# Patient Record
Sex: FEMALE | Race: BLACK OR AFRICAN AMERICAN | NOT HISPANIC OR LATINO | Employment: OTHER | URBAN - METROPOLITAN AREA
[De-identification: names, ages, dates, MRNs, and addresses within clinical notes are randomized per-mention and may not be internally consistent; named-entity substitution may affect disease eponyms.]

---

## 2017-01-24 PROBLEM — I95.1 ORTHOSTATIC HYPOTENSION: Status: ACTIVE | Noted: 2017-01-24

## 2017-01-24 PROBLEM — I49.1 PREMATURE ATRIAL CONTRACTIONS: Status: ACTIVE | Noted: 2017-01-24

## 2017-01-24 PROBLEM — J20.9 ACUTE PURULENT BRONCHITIS: Status: ACTIVE | Noted: 2017-01-24

## 2017-08-08 PROBLEM — Z86.79 HISTORY OF PSVT (PAROXYSMAL SUPRAVENTRICULAR TACHYCARDIA): Status: ACTIVE | Noted: 2017-08-08

## 2018-04-09 ENCOUNTER — OFFICE VISIT (OUTPATIENT)
Dept: CARDIOLOGY | Facility: CLINIC | Age: 78
End: 2018-04-09
Payer: MEDICARE

## 2018-04-09 VITALS
HEART RATE: 63 BPM | DIASTOLIC BLOOD PRESSURE: 77 MMHG | SYSTOLIC BLOOD PRESSURE: 129 MMHG | HEIGHT: 65 IN | BODY MASS INDEX: 30.04 KG/M2 | WEIGHT: 180.31 LBS

## 2018-04-09 DIAGNOSIS — E78.5 HYPERLIPIDEMIA, UNSPECIFIED HYPERLIPIDEMIA TYPE: ICD-10-CM

## 2018-04-09 DIAGNOSIS — I49.1 PREMATURE ATRIAL CONTRACTIONS: ICD-10-CM

## 2018-04-09 DIAGNOSIS — I95.1 ORTHOSTATIC HYPOTENSION: ICD-10-CM

## 2018-04-09 DIAGNOSIS — Z86.79 HISTORY OF PSVT (PAROXYSMAL SUPRAVENTRICULAR TACHYCARDIA): ICD-10-CM

## 2018-04-09 DIAGNOSIS — I44.4 LEFT ANTERIOR HEMIBLOCK: ICD-10-CM

## 2018-04-09 DIAGNOSIS — I44.0 FIRST DEGREE AV BLOCK: ICD-10-CM

## 2018-04-09 DIAGNOSIS — I10 ESSENTIAL HYPERTENSION: Primary | ICD-10-CM

## 2018-04-09 DIAGNOSIS — I44.1 2ND DEGREE AV BLOCK: ICD-10-CM

## 2018-04-09 DIAGNOSIS — I10 ESSENTIAL HYPERTENSION, BENIGN: ICD-10-CM

## 2018-04-09 PROCEDURE — 99999 PR PBB SHADOW E&M-NEW PATIENT-LVL III: CPT | Mod: PBBFAC,,, | Performed by: INTERNAL MEDICINE

## 2018-04-09 PROCEDURE — 99213 OFFICE O/P EST LOW 20 MIN: CPT | Mod: S$GLB,,, | Performed by: INTERNAL MEDICINE

## 2018-04-09 PROCEDURE — 93000 ELECTROCARDIOGRAM COMPLETE: CPT | Mod: S$GLB,,, | Performed by: INTERNAL MEDICINE

## 2018-04-09 PROCEDURE — 3078F DIAST BP <80 MM HG: CPT | Mod: CPTII,S$GLB,, | Performed by: INTERNAL MEDICINE

## 2018-04-09 PROCEDURE — 3074F SYST BP LT 130 MM HG: CPT | Mod: CPTII,S$GLB,, | Performed by: INTERNAL MEDICINE

## 2018-04-09 RX ORDER — DIPHENHYDRAMINE HCL 25 MG
25 CAPSULE ORAL NIGHTLY PRN
COMMUNITY

## 2018-04-09 NOTE — PROGRESS NOTES
Subjective:      Patient ID: Za Limon is a 78 y.o. female.    Chief Complaint: Follow-up (Hypertension)    HPI: Had the flu and sinus infection.    Pt had chest pain during the flu when coughing.     Review of Systems   Cardiovascular: Positive for chest pain (during the flu) and leg swelling (after eating xenia the left leg swells). Negative for claudication, dyspnea on exertion, irregular heartbeat, near-syncope, orthopnea, palpitations and syncope.        Past Medical History:   Diagnosis Date    CLL (chronic lymphocytic leukemia)     Hyperlipidemia     Hypertension         Past Surgical History:   Procedure Laterality Date    HYSTERECTOMY      left leg laser surgery         Family History   Problem Relation Age of Onset    Stroke Father     Heart disease Sister     Heart disease Brother        Social History     Social History    Marital status:      Spouse name: N/A    Number of children: N/A    Years of education: N/A     Social History Main Topics    Smoking status: Never Smoker    Smokeless tobacco: Never Used    Alcohol use None    Drug use: Unknown    Sexual activity: Not Asked     Other Topics Concern    None     Social History Narrative    None       Current Outpatient Prescriptions on File Prior to Visit   Medication Sig Dispense Refill    acetaminophen (TYLENOL) 500 MG tablet Take 500 mg by mouth every 6 (six) hours as needed for Pain.      aspirin (ECOTRIN) 81 MG EC tablet Take 81 mg by mouth once daily.      escitalopram oxalate (LEXAPRO) 10 MG tablet Take 10 mg by mouth once daily.       losartan-hydrochlorothiazide 100-12.5 mg (HYZAAR) 100-12.5 mg Tab 1 tablet once daily. One half tablet daily    2    omeprazole (PRILOSEC) 20 MG capsule Take 20 mg by mouth once daily.   3    rosuvastatin (CRESTOR) 40 MG Tab Take 10 mg by mouth every evening.       No current facility-administered medications on file prior to visit.        Review of patient's allergies  "indicates:   Allergen Reactions    Codeine      Objective:     Vitals:    04/09/18 1428   BP: 129/77   BP Location: Right arm   Patient Position: Sitting   BP Method: Large (Automatic)   Pulse: 63   Weight: 81.8 kg (180 lb 4.8 oz)   Height: 5' 5" (1.651 m)        Physical Exam   Constitutional: She is oriented to person, place, and time. She appears well-developed and well-nourished.   Eyes: No scleral icterus.   Neck: No JVD present. Carotid bruit is not present.   Cardiovascular: Normal rate and regular rhythm.  Exam reveals no gallop.    No murmur heard.  Pulmonary/Chest: Breath sounds normal.   Musculoskeletal: She exhibits edema (trivial LLE).   Neurological: She is alert and oriented to person, place, and time.   Skin: Skin is warm and dry.   Psychiatric: She has a normal mood and affect. Her behavior is normal. Judgment and thought content normal.   Vitals reviewed.     Wt down 9 lbs    ECG: NSR with first degree AV block, left axis deviation, PVC, clockwise rotation, possible anterior infarct.  Assessment:     1. Essential hypertension    2. Hyperlipidemia, unspecified hyperlipidemia type    3. First degree AV block    4. Left anterior hemiblock    5. 2nd degree AV block    6. Orthostatic hypotension    7. Premature atrial contractions    8. History of PSVT (paroxysmal supraventricular tachycardia)    9. Essential hypertension, benign      Plan:   Za was seen today for follow-up.    Diagnoses and all orders for this visit:    Essential hypertension    Hyperlipidemia, unspecified hyperlipidemia type    First degree AV block  -     IN OFFICE EKG 12-LEAD (to Muse)    Left anterior hemiblock  -     IN OFFICE EKG 12-LEAD (to Muse)    2nd degree AV block    Orthostatic hypotension    Premature atrial contractions    History of PSVT (paroxysmal supraventricular tachycardia)  -     IN OFFICE EKG 12-LEAD (to Muse)    Essential hypertension, benign  -     IN OFFICE EKG 12-LEAD (to Muse)         F/u with Dr Iqbal " for CLL    F/u with Dr Galvez    [facility]    RTC 6 months    Follow-up in about 6 months (around 10/9/2018).

## 2019-01-28 ENCOUNTER — OFFICE VISIT (OUTPATIENT)
Dept: FAMILY MEDICINE | Facility: CLINIC | Age: 79
End: 2019-01-28
Attending: FAMILY MEDICINE
Payer: MEDICARE

## 2019-01-28 VITALS
TEMPERATURE: 98 F | BODY MASS INDEX: 28.66 KG/M2 | HEIGHT: 65 IN | WEIGHT: 172 LBS | DIASTOLIC BLOOD PRESSURE: 64 MMHG | SYSTOLIC BLOOD PRESSURE: 125 MMHG

## 2019-01-28 DIAGNOSIS — Z00.00 ROUTINE GENERAL MEDICAL EXAMINATION AT A HEALTH CARE FACILITY: Primary | ICD-10-CM

## 2019-01-28 DIAGNOSIS — R73.02 IMPAIRED GLUCOSE TOLERANCE: ICD-10-CM

## 2019-01-28 DIAGNOSIS — I10 ESSENTIAL HYPERTENSION: ICD-10-CM

## 2019-01-28 DIAGNOSIS — F33.9 MAJOR DEPRESSION, RECURRENT, CHRONIC: ICD-10-CM

## 2019-01-28 DIAGNOSIS — K21.9 GASTROESOPHAGEAL REFLUX DISEASE WITHOUT ESOPHAGITIS: ICD-10-CM

## 2019-01-28 DIAGNOSIS — C91.10 CLL (CHRONIC LYMPHOCYTIC LEUKEMIA): ICD-10-CM

## 2019-01-28 DIAGNOSIS — M89.9 BONE DISORDER: ICD-10-CM

## 2019-01-28 DIAGNOSIS — E78.2 MIXED HYPERLIPIDEMIA: ICD-10-CM

## 2019-01-28 PROCEDURE — 99999 PR PBB SHADOW E&M-EST. PATIENT-LVL III: ICD-10-PCS | Mod: PBBFAC,,, | Performed by: FAMILY MEDICINE

## 2019-01-28 PROCEDURE — 3078F PR MOST RECENT DIASTOLIC BLOOD PRESSURE < 80 MM HG: ICD-10-PCS | Mod: CPTII,S$GLB,, | Performed by: FAMILY MEDICINE

## 2019-01-28 PROCEDURE — 99499 RISK ADDL DX/OHS AUDIT: ICD-10-PCS | Mod: S$GLB,,, | Performed by: FAMILY MEDICINE

## 2019-01-28 PROCEDURE — 3074F PR MOST RECENT SYSTOLIC BLOOD PRESSURE < 130 MM HG: ICD-10-PCS | Mod: CPTII,S$GLB,, | Performed by: FAMILY MEDICINE

## 2019-01-28 PROCEDURE — 3074F SYST BP LT 130 MM HG: CPT | Mod: CPTII,S$GLB,, | Performed by: FAMILY MEDICINE

## 2019-01-28 PROCEDURE — 99387 INIT PM E/M NEW PAT 65+ YRS: CPT | Mod: S$GLB,,, | Performed by: FAMILY MEDICINE

## 2019-01-28 PROCEDURE — 3078F DIAST BP <80 MM HG: CPT | Mod: CPTII,S$GLB,, | Performed by: FAMILY MEDICINE

## 2019-01-28 PROCEDURE — 99387 PR PREVENTIVE VISIT,NEW,65 & OVER: ICD-10-PCS | Mod: S$GLB,,, | Performed by: FAMILY MEDICINE

## 2019-01-28 PROCEDURE — 99499 UNLISTED E&M SERVICE: CPT | Mod: S$GLB,,, | Performed by: FAMILY MEDICINE

## 2019-01-28 PROCEDURE — 99999 PR PBB SHADOW E&M-EST. PATIENT-LVL III: CPT | Mod: PBBFAC,,, | Performed by: FAMILY MEDICINE

## 2019-01-28 RX ORDER — LOSARTAN POTASSIUM AND HYDROCHLOROTHIAZIDE 12.5; 1 MG/1; MG/1
1 TABLET ORAL DAILY
Qty: 90 TABLET | Refills: 3 | Status: ON HOLD | OUTPATIENT
Start: 2019-01-28 | End: 2020-02-24 | Stop reason: HOSPADM

## 2019-01-28 RX ORDER — ROSUVASTATIN CALCIUM 40 MG/1
40 TABLET, COATED ORAL NIGHTLY
Qty: 90 TABLET | Refills: 3 | Status: SHIPPED | OUTPATIENT
Start: 2019-01-28 | End: 2020-02-17

## 2019-01-28 RX ORDER — OMEPRAZOLE 20 MG/1
20 CAPSULE, DELAYED RELEASE ORAL DAILY
Qty: 90 CAPSULE | Refills: 3 | Status: SHIPPED | OUTPATIENT
Start: 2019-01-28 | End: 2019-05-02

## 2019-01-28 RX ORDER — ESCITALOPRAM OXALATE 10 MG/1
10 TABLET ORAL DAILY
Qty: 90 TABLET | Refills: 3 | Status: SHIPPED | OUTPATIENT
Start: 2019-01-28 | End: 2020-04-22 | Stop reason: SDUPTHER

## 2019-01-28 NOTE — PROGRESS NOTES
Subjective:       Patient ID: Za Limon is a 78 y.o. female.    Chief Complaint: Annual Exam (patient wants blood sugar check) and Knee Pain    78 yr old pleasant black female with HTN, HLD, depression, CLL, and other co morbidities presents today as new patient and establishing primary care. She is also here for annual wellness check, and lab work.      HTN - controlled - on hyzaar 100-12.5 mg daily - compliant - no side effects      HLD - controlled - on statin - lab due      Depression - controlled - on Lexapro 20 mg daily - compliant - no side effects       CLL -  used to follow Hematology outside - she wants labs       History as below - reviewed             Hypertension   This is a chronic problem. The current episode started more than 1 year ago. The problem has been gradually improving since onset. The problem is controlled. Pertinent negatives include no anxiety, chest pain, headaches, malaise/fatigue, orthopnea, palpitations, peripheral edema or shortness of breath. There are no associated agents to hypertension. Risk factors for coronary artery disease include dyslipidemia and post-menopausal state. Past treatments include angiotensin blockers and diuretics. The current treatment provides significant improvement. There are no compliance problems.  There is no history of angina, CAD/MI, CVA, left ventricular hypertrophy or PVD. There is no history of chronic renal disease, hyperaldosteronism, hyperparathyroidism, a hypertension causing med, renovascular disease or a thyroid problem.   Hyperlipidemia   This is a chronic problem. The current episode started more than 1 year ago. The problem is controlled. Recent lipid tests were reviewed and are normal. She has no history of chronic renal disease, hypothyroidism or obesity. There are no known factors aggravating her hyperlipidemia. Pertinent negatives include no chest pain, focal weakness, leg pain, myalgias or shortness of breath. Current  antihyperlipidemic treatment includes statins. The current treatment provides moderate improvement of lipids. There are no compliance problems.  Risk factors for coronary artery disease include dyslipidemia, hypertension and post-menopausal.     Review of Systems   Constitutional: Negative.  Negative for activity change, diaphoresis, malaise/fatigue and unexpected weight change.   HENT: Negative.  Negative for congestion, ear discharge, hearing loss, rhinorrhea, sore throat and voice change.    Eyes: Negative.  Negative for pain, discharge and visual disturbance.   Respiratory: Negative.  Negative for chest tightness, shortness of breath and wheezing.    Cardiovascular: Negative.  Negative for chest pain, palpitations and orthopnea.   Gastrointestinal: Negative.  Negative for abdominal distention, anal bleeding, constipation and nausea.   Endocrine: Negative.  Negative for cold intolerance, polydipsia and polyuria.   Genitourinary: Negative.  Negative for decreased urine volume, difficulty urinating, dysuria, frequency, menstrual problem and vaginal pain.   Musculoskeletal: Negative.  Negative for arthralgias, gait problem and myalgias.   Skin: Negative.  Negative for color change, pallor and wound.   Allergic/Immunologic: Negative.  Negative for environmental allergies and immunocompromised state.   Neurological: Negative.  Negative for dizziness, tremors, focal weakness, seizures, speech difficulty and headaches.   Hematological: Negative.  Negative for adenopathy. Does not bruise/bleed easily.   Psychiatric/Behavioral: Negative.  Negative for agitation, confusion, decreased concentration, hallucinations, self-injury and suicidal ideas. The patient is not nervous/anxious.        Active Ambulatory Problems     Diagnosis Date Noted    Syncope and collapse 04/05/2016    Essential hypertension 04/05/2016    Hyperlipidemia 04/05/2016    CLL (chronic lymphocytic leukemia) 04/05/2016    First degree AV block  04/05/2016    Left anterior hemiblock 04/05/2016    Chest pain 04/05/2016    2nd degree AV block 05/06/2016    Acute purulent bronchitis 01/24/2017    Orthostatic hypotension 01/24/2017    Premature atrial contractions 01/24/2017    History of PSVT (paroxysmal supraventricular tachycardia) 08/08/2017    Major depression, recurrent, chronic 01/28/2019    Gastroesophageal reflux disease without esophagitis 01/28/2019     Resolved Ambulatory Problems     Diagnosis Date Noted    No Resolved Ambulatory Problems     Past Medical History:   Diagnosis Date    CLL (chronic lymphocytic leukemia)     Hyperlipidemia     Hypertension      Past Surgical History:   Procedure Laterality Date    HYSTERECTOMY      left leg laser surgery       Family History   Problem Relation Age of Onset    Stroke Father     Heart disease Sister     Heart disease Brother      Review of patient's allergies indicates:   Allergen Reactions    Codeine      Current Outpatient Medications on File Prior to Visit   Medication Sig Dispense Refill    [DISCONTINUED] escitalopram oxalate (LEXAPRO) 10 MG tablet Take 10 mg by mouth once daily.       [DISCONTINUED] losartan-hydrochlorothiazide 100-12.5 mg (HYZAAR) 100-12.5 mg Tab 1 tablet once daily. One half tablet daily    2    [DISCONTINUED] omeprazole (PRILOSEC) 20 MG capsule Take 20 mg by mouth once daily.   3    [DISCONTINUED] rosuvastatin (CRESTOR) 40 MG Tab Take 10 mg by mouth every evening.      acetaminophen (TYLENOL) 500 MG tablet Take 500 mg by mouth every 6 (six) hours as needed for Pain.      aspirin (ECOTRIN) 81 MG EC tablet Take 81 mg by mouth once daily.      diphenhydrAMINE (BENADRYL) 25 mg capsule Take 25 mg by mouth nightly as needed for Itching.       No current facility-administered medications on file prior to visit.        Objective:       Vitals:    01/28/19 1458   BP: 125/64   Temp: 98.2 °F (36.8 °C)       Physical Exam   Constitutional: She is oriented to  person, place, and time. She appears well-developed and well-nourished. No distress.   HENT:   Head: Normocephalic and atraumatic.   Right Ear: External ear normal.   Left Ear: External ear normal.   Nose: Nose normal.   Mouth/Throat: Oropharynx is clear and moist. No oropharyngeal exudate.   Eyes: Conjunctivae and EOM are normal. Pupils are equal, round, and reactive to light. Right eye exhibits no discharge. Left eye exhibits no discharge. No scleral icterus.   Neck: Normal range of motion. Neck supple. No JVD present. No tracheal deviation present. No thyromegaly present.   Cardiovascular: Normal rate, regular rhythm, normal heart sounds and intact distal pulses. Exam reveals no gallop and no friction rub.   No murmur heard.  Pulmonary/Chest: Effort normal and breath sounds normal. No stridor. She has no wheezes. She has no rales. She exhibits no tenderness.   Abdominal: Soft. Bowel sounds are normal. She exhibits no distension and no mass. There is no tenderness. There is no rebound and no guarding. No hernia.   Musculoskeletal: Normal range of motion. She exhibits no edema or tenderness.   Lymphadenopathy:     She has no cervical adenopathy.   Neurological: She is alert and oriented to person, place, and time. She has normal reflexes. She displays normal reflexes. No cranial nerve deficit. She exhibits normal muscle tone. Coordination normal.   Skin: Skin is warm and dry. No rash noted. She is not diaphoretic. No erythema. No pallor.   Psychiatric: She has a normal mood and affect. Her behavior is normal. Judgment and thought content normal.       Assessment:       1. Routine general medical examination at a health care facility    2. Essential hypertension    3. Mixed hyperlipidemia    4. CLL (chronic lymphocytic leukemia)    5. Major depression, recurrent, chronic    6. Gastroesophageal reflux disease without esophagitis    7. Bone disorder    8. Impaired glucose tolerance         Plan:       Za was seen  today for annual exam and knee pain.    Diagnoses and all orders for this visit:    Routine general medical examination at a health care facility  -     CBC auto differential; Future  -     Comprehensive metabolic panel; Future  -     Lipid panel; Future  -     Urinalysis; Future    Essential hypertension  -     losartan-hydrochlorothiazide 100-12.5 mg (HYZAAR) 100-12.5 mg Tab; Take 1 tablet by mouth once daily. One half tablet daily  -     CBC auto differential; Future  -     Comprehensive metabolic panel; Future  -     Lipid panel; Future  -     Urinalysis; Future  -     Hemoglobin A1c; Future    Mixed hyperlipidemia  -     rosuvastatin (CRESTOR) 40 MG Tab; Take 1 tablet (40 mg total) by mouth every evening.  -     CBC auto differential; Future  -     Comprehensive metabolic panel; Future  -     Lipid panel; Future    CLL (chronic lymphocytic leukemia)  -     CBC auto differential; Future    Major depression, recurrent, chronic  -     escitalopram oxalate (LEXAPRO) 10 MG tablet; Take 1 tablet (10 mg total) by mouth once daily.    Gastroesophageal reflux disease without esophagitis  -     omeprazole (PRILOSEC) 20 MG capsule; Take 1 capsule (20 mg total) by mouth once daily.    Bone disorder  -     DXA Bone Density Spine And Hip; Future    Impaired glucose tolerance   -     Hemoglobin A1c; Future      Wellness check  -normal exam  -labs    CLL  -labs  -managed hematology    Depression  -controlled - lexapro    GERD  -controlled    IGT  -labs    HLD  -controlled    HTN  -controlled    Spent adequate time in obtaining history and explaining differentials    40 minutes spent during this visit of which greater than 50% devoted to face-face counseling and coordination of care regarding diagnosis and management plan    Follow-up in about 6 months (around 7/28/2019), or if symptoms worsen or fail to improve.

## 2019-02-04 ENCOUNTER — HOSPITAL ENCOUNTER (OUTPATIENT)
Dept: RADIOLOGY | Facility: HOSPITAL | Age: 79
Discharge: HOME | End: 2019-02-04
Attending: FAMILY MEDICINE
Payer: MEDICARE

## 2019-02-04 DIAGNOSIS — M89.9 BONE DISORDER: ICD-10-CM

## 2019-02-04 PROCEDURE — 77080 DXA BONE DENSITY AXIAL: CPT | Mod: 26,,, | Performed by: RADIOLOGY

## 2019-02-04 PROCEDURE — 77080 DEXA BONE DENSITY SPINE HIP: ICD-10-PCS | Mod: 26,,, | Performed by: RADIOLOGY

## 2019-02-04 PROCEDURE — 77080 DXA BONE DENSITY AXIAL: CPT | Mod: TC

## 2019-02-05 ENCOUNTER — TELEPHONE (OUTPATIENT)
Dept: FAMILY MEDICINE | Facility: CLINIC | Age: 79
End: 2019-02-05

## 2019-02-05 DIAGNOSIS — M81.0 OSTEOPOROSIS, UNSPECIFIED OSTEOPOROSIS TYPE, UNSPECIFIED PATHOLOGICAL FRACTURE PRESENCE: Primary | ICD-10-CM

## 2019-02-05 NOTE — TELEPHONE ENCOUNTER
Called patient to notify that BONE DENSITY TEST SHOWED OSTEOPOROSIS WHICH IS SEVERE BONE LOSS  - LET ME KNOW IF INTERESTED IN TREATMENT.  No answer, left voicemail.

## 2019-02-05 NOTE — TELEPHONE ENCOUNTER
Called patient and she states that she if fine with receiving treatment for osteoporosis.  Please advise.

## 2019-02-06 ENCOUNTER — TELEPHONE (OUTPATIENT)
Dept: FAMILY MEDICINE | Facility: CLINIC | Age: 79
End: 2019-02-06

## 2019-02-06 RX ORDER — ALENDRONATE SODIUM 70 MG/1
70 TABLET ORAL
Qty: 12 TABLET | Refills: 3 | Status: SHIPPED | OUTPATIENT
Start: 2019-02-06 | End: 2019-12-26

## 2019-02-06 NOTE — TELEPHONE ENCOUNTER
Sent fosamax.     Administer first thing in the morning and at least 30 minutes before the first food, beverage (except plain water), or other medication(s) of the day. Do not take with mineral water or with other beverages. Patients should be instructed to stay upright (not to lie down) for at least 30 minutes and until after first food of the day (to reduce esophageal irritation).

## 2019-02-06 NOTE — TELEPHONE ENCOUNTER
Left message to advised of new medication directions.  Requested call back.        Administer first thing in the morning and at least 30 minutes before the first food, beverage (except plain water), or other medication(s) of the day. Do not take with mineral water or with other beverages. Patients should be instructed to stay upright (not to lie down) for at least 30 minutes and until after first food of the day (to reduce esophageal irritation).

## 2019-02-06 NOTE — TELEPHONE ENCOUNTER
Advised patient that Dr Solorzano has sent prescription over to pharmacy.   Patient is to take first thing in the morning and at least 30 minutes before the first food, beverage (except plain water), or other medication(s) of the day. Do not take with mineral water or with other beverages. Patients should be instructed to stay upright (not to lie down) for at least 30 minutes and until after first food of the day (to reduce esophageal irritation).

## 2019-03-13 ENCOUNTER — TELEPHONE (OUTPATIENT)
Dept: FAMILY MEDICINE | Facility: CLINIC | Age: 79
End: 2019-03-13

## 2019-03-13 DIAGNOSIS — Z12.39 ENCOUNTER FOR BREAST CANCER SCREENING OTHER THAN MAMMOGRAM: ICD-10-CM

## 2019-03-13 DIAGNOSIS — Z12.31 ENCOUNTER FOR SCREENING MAMMOGRAM FOR MALIGNANT NEOPLASM OF BREAST: Primary | ICD-10-CM

## 2019-03-13 NOTE — TELEPHONE ENCOUNTER
----- Message from Rose Murrieta sent at 3/13/2019  2:29 PM CDT -----  Contact: [phone]  Patient requesting [facility] [place] @ [phone]. Please call.

## 2019-03-20 ENCOUNTER — HOSPITAL ENCOUNTER (OUTPATIENT)
Dept: RADIOLOGY | Facility: HOSPITAL | Age: 79
Discharge: HOME | End: 2019-03-20
Attending: FAMILY MEDICINE
Payer: MEDICARE

## 2019-03-20 VITALS — WEIGHT: 170 LBS | HEIGHT: 65 IN | BODY MASS INDEX: 28.32 KG/M2

## 2019-03-20 DIAGNOSIS — Z12.31 ENCOUNTER FOR SCREENING MAMMOGRAM FOR MALIGNANT NEOPLASM OF BREAST: ICD-10-CM

## 2019-03-20 DIAGNOSIS — Z12.39 ENCOUNTER FOR BREAST CANCER SCREENING OTHER THAN MAMMOGRAM: ICD-10-CM

## 2019-03-20 PROCEDURE — 77067 MAMMO DIGITAL SCREENING BILAT WITH TOMOSYNTHESIS_CAD: ICD-10-PCS | Mod: 26,,, | Performed by: RADIOLOGY

## 2019-03-20 PROCEDURE — 77067 SCR MAMMO BI INCL CAD: CPT | Mod: 26,,, | Performed by: RADIOLOGY

## 2019-03-20 PROCEDURE — 77063 MAMMO DIGITAL SCREENING BILAT WITH TOMOSYNTHESIS_CAD: ICD-10-PCS | Mod: 26,,, | Performed by: RADIOLOGY

## 2019-03-20 PROCEDURE — 77067 SCR MAMMO BI INCL CAD: CPT | Mod: TC

## 2019-03-20 PROCEDURE — 77063 BREAST TOMOSYNTHESIS BI: CPT | Mod: 26,,, | Performed by: RADIOLOGY

## 2019-05-02 ENCOUNTER — OFFICE VISIT (OUTPATIENT)
Dept: CARDIOLOGY | Facility: CLINIC | Age: 79
End: 2019-05-02
Payer: MEDICARE

## 2019-05-02 VITALS
BODY MASS INDEX: 28.64 KG/M2 | WEIGHT: 171.88 LBS | HEART RATE: 74 BPM | HEIGHT: 65 IN | DIASTOLIC BLOOD PRESSURE: 68 MMHG | SYSTOLIC BLOOD PRESSURE: 129 MMHG

## 2019-05-02 DIAGNOSIS — E78.00 PURE HYPERCHOLESTEROLEMIA: ICD-10-CM

## 2019-05-02 DIAGNOSIS — I44.4 LEFT ANTERIOR HEMIBLOCK: ICD-10-CM

## 2019-05-02 DIAGNOSIS — I44.1 2ND DEGREE AV BLOCK: ICD-10-CM

## 2019-05-02 DIAGNOSIS — I44.0 FIRST DEGREE AV BLOCK: ICD-10-CM

## 2019-05-02 DIAGNOSIS — I10 ESSENTIAL HYPERTENSION: Primary | ICD-10-CM

## 2019-05-02 DIAGNOSIS — Z86.79 HISTORY OF PSVT (PAROXYSMAL SUPRAVENTRICULAR TACHYCARDIA): ICD-10-CM

## 2019-05-02 DIAGNOSIS — K21.9 GASTROESOPHAGEAL REFLUX DISEASE WITHOUT ESOPHAGITIS: ICD-10-CM

## 2019-05-02 DIAGNOSIS — I49.1 PREMATURE ATRIAL CONTRACTIONS: ICD-10-CM

## 2019-05-02 DIAGNOSIS — C91.10 CLL (CHRONIC LYMPHOCYTIC LEUKEMIA): ICD-10-CM

## 2019-05-02 DIAGNOSIS — I95.1 ORTHOSTATIC HYPOTENSION: ICD-10-CM

## 2019-05-02 PROCEDURE — 99499 RISK ADDL DX/OHS AUDIT: ICD-10-PCS | Mod: S$GLB,,, | Performed by: INTERNAL MEDICINE

## 2019-05-02 PROCEDURE — 93000 EKG 12-LEAD: ICD-10-PCS | Mod: S$GLB,,, | Performed by: INTERNAL MEDICINE

## 2019-05-02 PROCEDURE — 3074F PR MOST RECENT SYSTOLIC BLOOD PRESSURE < 130 MM HG: ICD-10-PCS | Mod: CPTII,S$GLB,, | Performed by: INTERNAL MEDICINE

## 2019-05-02 PROCEDURE — 99214 OFFICE O/P EST MOD 30 MIN: CPT | Mod: S$GLB,,, | Performed by: INTERNAL MEDICINE

## 2019-05-02 PROCEDURE — 3078F DIAST BP <80 MM HG: CPT | Mod: CPTII,S$GLB,, | Performed by: INTERNAL MEDICINE

## 2019-05-02 PROCEDURE — 99499 UNLISTED E&M SERVICE: CPT | Mod: S$GLB,,, | Performed by: INTERNAL MEDICINE

## 2019-05-02 PROCEDURE — 99999 PR PBB SHADOW E&M-EST. PATIENT-LVL II: ICD-10-PCS | Mod: PBBFAC,,, | Performed by: INTERNAL MEDICINE

## 2019-05-02 PROCEDURE — 99214 PR OFFICE/OUTPT VISIT, EST, LEVL IV, 30-39 MIN: ICD-10-PCS | Mod: S$GLB,,, | Performed by: INTERNAL MEDICINE

## 2019-05-02 PROCEDURE — 1101F PR PT FALLS ASSESS DOC 0-1 FALLS W/OUT INJ PAST YR: ICD-10-PCS | Mod: CPTII,S$GLB,, | Performed by: INTERNAL MEDICINE

## 2019-05-02 PROCEDURE — 1101F PT FALLS ASSESS-DOCD LE1/YR: CPT | Mod: CPTII,S$GLB,, | Performed by: INTERNAL MEDICINE

## 2019-05-02 PROCEDURE — 3078F PR MOST RECENT DIASTOLIC BLOOD PRESSURE < 80 MM HG: ICD-10-PCS | Mod: CPTII,S$GLB,, | Performed by: INTERNAL MEDICINE

## 2019-05-02 PROCEDURE — 99999 PR PBB SHADOW E&M-EST. PATIENT-LVL II: CPT | Mod: PBBFAC,,, | Performed by: INTERNAL MEDICINE

## 2019-05-02 PROCEDURE — 93000 ELECTROCARDIOGRAM COMPLETE: CPT | Mod: S$GLB,,, | Performed by: INTERNAL MEDICINE

## 2019-05-02 PROCEDURE — 3074F SYST BP LT 130 MM HG: CPT | Mod: CPTII,S$GLB,, | Performed by: INTERNAL MEDICINE

## 2019-05-02 NOTE — PROGRESS NOTES
Subjective:      Patient ID: Za Limon is a 79 y.o. female.    Chief Complaint: No chief complaint on file.    HPI: Walks a litle   Does exercise by doing all housekeeping duties.  Pt has chest pain when coughing only when she has a bad cold  Occasionally short of breath after doing housework for 3 hours.  Pt paces herself.  No fainting or falling since I had discontinue the metoprolol and losartan/HCTZ was reduced      Review of Systems   Cardiovascular: Positive for chest pain (Only when coughing with a cold) and dyspnea on exertion (chronic, mild, stable). Negative for claudication, irregular heartbeat, leg swelling, near-syncope, orthopnea, palpitations and syncope.      Heartburn is controlled with prn omeprazole  Past Medical History:   Diagnosis Date    CLL (chronic lymphocytic leukemia)     Hyperlipidemia     Hypertension         Past Surgical History:   Procedure Laterality Date    HYSTERECTOMY      left leg laser surgery         Family History   Problem Relation Age of Onset    Stroke Father     Heart disease Sister     Heart disease Brother        Social History     Socioeconomic History    Marital status:      Spouse name: Not on file    Number of children: Not on file    Years of education: Not on file    Highest education level: Not on file   Occupational History    Not on file   Social Needs    Financial resource strain: Not on file    Food insecurity:     Worry: Not on file     Inability: Not on file    Transportation needs:     Medical: Not on file     Non-medical: Not on file   Tobacco Use    Smoking status: Never Smoker    Smokeless tobacco: Never Used   Substance and Sexual Activity    Alcohol use: Not on file    Drug use: Not on file    Sexual activity: Not on file   Lifestyle    Physical activity:     Days per week: Not on file     Minutes per session: Not on file    Stress: Not on file   Relationships    Social connections:     Talks on phone: Not on file  "    Gets together: Not on file     Attends [employer] service: Not on file     Active member of club or organization: Not on file     Attends meetings of clubs or organizations: Not on file     Relationship status: Not on file   Other Topics Concern    Not on file   Social History Narrative    Not on file       Current Outpatient Medications on File Prior to Visit   Medication Sig Dispense Refill    acetaminophen (TYLENOL) 500 MG tablet Take 500 mg by mouth every 6 (six) hours as needed for Pain.      alendronate (FOSAMAX) 70 MG tablet Take 1 tablet (70 mg total) by mouth every 7 days. 12 tablet 3    aspirin (ECOTRIN) 81 MG EC tablet Take 81 mg by mouth once daily.      diphenhydrAMINE (BENADRYL) 25 mg capsule Take 25 mg by mouth nightly as needed for Itching.      escitalopram oxalate (LEXAPRO) 10 MG tablet Take 1 tablet (10 mg total) by mouth once daily. 90 tablet 3    guaifenesin/dextromethorphan (ROBITUSSIN-DM ORAL) Take by mouth every evening.      losartan-hydrochlorothiazide 100-12.5 mg (HYZAAR) 100-12.5 mg Tab Take 1 tablet by mouth once daily. One half tablet daily 90 tablet 3    rosuvastatin (CRESTOR) 40 MG Tab Take 1 tablet (40 mg total) by mouth every evening. 90 tablet 3    [DISCONTINUED] omeprazole (PRILOSEC) 20 MG capsule Take 1 capsule (20 mg total) by mouth once daily. 90 capsule 3     No current facility-administered medications on file prior to visit.        Review of patient's allergies indicates:   Allergen Reactions    Codeine      Objective:     Vitals:    05/02/19 1059   BP: 129/68   BP Location: Left arm   Patient Position: Sitting   BP Method: Large (Automatic)   Pulse: 74   Weight: 78 kg (171 lb 13.6 oz)   Height: 5' 5" (1.651 m)        Physical Exam   Constitutional: She is oriented to person, place, and time. She appears well-developed and well-nourished.   Eyes: No scleral icterus.   Neck: No JVD present. Carotid bruit is not present.   Cardiovascular: Normal rate and regular " rhythm. Exam reveals no gallop.   No murmur heard.  Pulmonary/Chest: Breath sounds normal.   Musculoskeletal: She exhibits no edema.   Neurological: She is alert and oriented to person, place, and time.   Skin: Skin is warm and dry.   Psychiatric: She has a normal mood and affect. Her behavior is normal. Judgment and thought content normal.   Vitals reviewed.     Wt down 8 lbs over past year.    ECG: NSR with first degree AV block and PAC's and blocked PAC's  Assessment:     1. Essential hypertension    2. Pure hypercholesterolemia    3. First degree AV block    4. Left anterior hemiblock    5. 2nd degree AV block    6. Orthostatic hypotension    7. Premature atrial contractions    8. History of PSVT (paroxysmal supraventricular tachycardia)    9. CLL (chronic lymphocytic leukemia)    10. Gastroesophageal reflux disease without esophagitis      Plan:   Diagnoses and all orders for this visit:    Essential hypertension    Pure hypercholesterolemia    First degree AV block    Left anterior hemiblock    2nd degree AV block    Orthostatic hypotension    Premature atrial contractions  -     IN OFFICE EKG 12-LEAD (to Muse)    History of PSVT (paroxysmal supraventricular tachycardia)  -     IN OFFICE EKG 12-LEAD (to Muse)    CLL (chronic lymphocytic leukemia)    Gastroesophageal reflux disease without esophagitis  -     ranitidine (ZANTAC) 150 MG tablet; Take 1 tablet (150 mg total) by mouth 2 (two) times daily.     I suggested that pt take ranitidine 150 mg bid in place of omeprazole due to long term safety profile    Rest of meds the same    OK to eat salmon daily    Follow up in about 6 months (around 11/2/2019).

## 2019-07-29 ENCOUNTER — OFFICE VISIT (OUTPATIENT)
Dept: FAMILY MEDICINE | Facility: CLINIC | Age: 79
End: 2019-07-29
Attending: FAMILY MEDICINE
Payer: MEDICARE

## 2019-07-29 VITALS
DIASTOLIC BLOOD PRESSURE: 80 MMHG | SYSTOLIC BLOOD PRESSURE: 134 MMHG | HEIGHT: 65 IN | OXYGEN SATURATION: 98 % | WEIGHT: 171.31 LBS | BODY MASS INDEX: 28.54 KG/M2 | HEART RATE: 69 BPM

## 2019-07-29 DIAGNOSIS — G31.84 MCI (MILD COGNITIVE IMPAIRMENT): ICD-10-CM

## 2019-07-29 DIAGNOSIS — M81.0 OSTEOPOROSIS OF FEMUR WITHOUT PATHOLOGICAL FRACTURE: ICD-10-CM

## 2019-07-29 DIAGNOSIS — I10 ESSENTIAL HYPERTENSION: Primary | ICD-10-CM

## 2019-07-29 DIAGNOSIS — F33.9 MAJOR DEPRESSION, RECURRENT, CHRONIC: ICD-10-CM

## 2019-07-29 DIAGNOSIS — K21.9 GASTROESOPHAGEAL REFLUX DISEASE WITHOUT ESOPHAGITIS: ICD-10-CM

## 2019-07-29 DIAGNOSIS — E78.00 PURE HYPERCHOLESTEROLEMIA: ICD-10-CM

## 2019-07-29 DIAGNOSIS — C91.10 CLL (CHRONIC LYMPHOCYTIC LEUKEMIA): ICD-10-CM

## 2019-07-29 PROBLEM — J20.9 ACUTE PURULENT BRONCHITIS: Status: RESOLVED | Noted: 2017-01-24 | Resolved: 2019-07-29

## 2019-07-29 PROCEDURE — 3079F PR MOST RECENT DIASTOLIC BLOOD PRESSURE 80-89 MM HG: ICD-10-PCS | Mod: CPTII,S$GLB,, | Performed by: FAMILY MEDICINE

## 2019-07-29 PROCEDURE — 3079F DIAST BP 80-89 MM HG: CPT | Mod: CPTII,S$GLB,, | Performed by: FAMILY MEDICINE

## 2019-07-29 PROCEDURE — 1101F PT FALLS ASSESS-DOCD LE1/YR: CPT | Mod: CPTII,S$GLB,, | Performed by: FAMILY MEDICINE

## 2019-07-29 PROCEDURE — 1101F PR PT FALLS ASSESS DOC 0-1 FALLS W/OUT INJ PAST YR: ICD-10-PCS | Mod: CPTII,S$GLB,, | Performed by: FAMILY MEDICINE

## 2019-07-29 PROCEDURE — 99999 PR PBB SHADOW E&M-EST. PATIENT-LVL III: ICD-10-PCS | Mod: PBBFAC,,, | Performed by: FAMILY MEDICINE

## 2019-07-29 PROCEDURE — 99214 PR OFFICE/OUTPT VISIT, EST, LEVL IV, 30-39 MIN: ICD-10-PCS | Mod: S$GLB,,, | Performed by: FAMILY MEDICINE

## 2019-07-29 PROCEDURE — 99214 OFFICE O/P EST MOD 30 MIN: CPT | Mod: S$GLB,,, | Performed by: FAMILY MEDICINE

## 2019-07-29 PROCEDURE — 99499 UNLISTED E&M SERVICE: CPT | Mod: S$GLB,,, | Performed by: FAMILY MEDICINE

## 2019-07-29 PROCEDURE — 99999 PR PBB SHADOW E&M-EST. PATIENT-LVL III: CPT | Mod: PBBFAC,,, | Performed by: FAMILY MEDICINE

## 2019-07-29 PROCEDURE — 3075F PR MOST RECENT SYSTOLIC BLOOD PRESS GE 130-139MM HG: ICD-10-PCS | Mod: CPTII,S$GLB,, | Performed by: FAMILY MEDICINE

## 2019-07-29 PROCEDURE — 99499 RISK ADDL DX/OHS AUDIT: ICD-10-PCS | Mod: S$GLB,,, | Performed by: FAMILY MEDICINE

## 2019-07-29 PROCEDURE — 3075F SYST BP GE 130 - 139MM HG: CPT | Mod: CPTII,S$GLB,, | Performed by: FAMILY MEDICINE

## 2019-07-29 NOTE — PROGRESS NOTES
Subjective:       Patient ID: Za Limon is a 79 y.o. female.    Chief Complaint: Follow-up (6 mths follow-up)    79 yr old pleasant black female with HTN, HLD, depression, CLL, and other co morbidities presents today for her 6 month follow up. C/o mild memory deficits and forgetting some common stores and feels nor familiar with the area and it takes times to get back to her baseline. It is getting more frequently and would like to see specilist.      HTN - controlled - on hyzaar 100-12.5 mg daily - compliant - no side effects      HLD - controlled - on statin -LDLCALC                  75.2                02/04/2019                Depression - controlled - on Lexapro 20 mg daily - compliant - no side effects       CLL -  used to follow Hematology outside - she wants labs       History as below - reviewed           Hypertension   This is a chronic problem. The current episode started more than 1 year ago. The problem has been gradually improving since onset. The problem is controlled. Pertinent negatives include no anxiety, chest pain, headaches, malaise/fatigue, orthopnea, palpitations, peripheral edema or shortness of breath. There are no associated agents to hypertension. Risk factors for coronary artery disease include dyslipidemia and post-menopausal state. Past treatments include angiotensin blockers and diuretics. The current treatment provides significant improvement. There are no compliance problems.  There is no history of angina, CAD/MI, CVA, left ventricular hypertrophy or PVD. There is no history of chronic renal disease, hyperaldosteronism, hyperparathyroidism, a hypertension causing med, renovascular disease or a thyroid problem.   Hyperlipidemia   This is a chronic problem. The current episode started more than 1 year ago. The problem is controlled. Recent lipid tests were reviewed and are normal. She has no history of chronic renal disease, hypothyroidism or obesity. There are no known factors  aggravating her hyperlipidemia. Pertinent negatives include no chest pain, focal weakness, leg pain, myalgias or shortness of breath. Current antihyperlipidemic treatment includes statins. The current treatment provides moderate improvement of lipids. There are no compliance problems.  Risk factors for coronary artery disease include dyslipidemia, hypertension and post-menopausal.     Review of Systems   Constitutional: Negative.  Negative for activity change, diaphoresis, malaise/fatigue and unexpected weight change.   HENT: Negative.  Negative for congestion, ear discharge, hearing loss, rhinorrhea, sore throat and voice change.    Eyes: Negative.  Negative for pain, discharge and visual disturbance.   Respiratory: Negative.  Negative for chest tightness, shortness of breath and wheezing.    Cardiovascular: Negative.  Negative for chest pain, palpitations and orthopnea.   Gastrointestinal: Negative.  Negative for abdominal distention, anal bleeding, constipation and nausea.   Endocrine: Negative.  Negative for cold intolerance, polydipsia and polyuria.   Genitourinary: Negative.  Negative for decreased urine volume, difficulty urinating, dysuria, frequency, menstrual problem and vaginal pain.   Musculoskeletal: Negative.  Negative for arthralgias, gait problem and myalgias.   Skin: Negative.  Negative for color change, pallor and wound.   Allergic/Immunologic: Negative.  Negative for environmental allergies and immunocompromised state.   Neurological: Negative.  Negative for dizziness, tremors, focal weakness, seizures, speech difficulty and headaches.   Hematological: Negative.  Negative for adenopathy. Does not bruise/bleed easily.   Psychiatric/Behavioral: Negative.  Negative for agitation, confusion, decreased concentration, hallucinations, self-injury and suicidal ideas. The patient is not nervous/anxious.        PMH/PSH/FH/SH/MED/ALLERGY reviewed    Objective:       Vitals:    07/29/19 1337   BP: 134/80    Pulse: 69       Physical Exam   Constitutional: She is oriented to person, place, and time. She appears well-developed and well-nourished. No distress.   HENT:   Head: Normocephalic and atraumatic.   Right Ear: External ear normal.   Left Ear: External ear normal.   Nose: Nose normal.   Mouth/Throat: Oropharynx is clear and moist. No oropharyngeal exudate.   Eyes: Pupils are equal, round, and reactive to light. Conjunctivae and EOM are normal. Right eye exhibits no discharge. Left eye exhibits no discharge. No scleral icterus.   Neck: Normal range of motion. Neck supple. No JVD present. No tracheal deviation present. No thyromegaly present.   Cardiovascular: Normal rate, regular rhythm, normal heart sounds and intact distal pulses. Exam reveals no gallop and no friction rub.   No murmur heard.  Pulmonary/Chest: Effort normal and breath sounds normal. No stridor. She has no wheezes. She has no rales. She exhibits no tenderness.   Abdominal: Soft. Bowel sounds are normal. She exhibits no distension and no mass. There is no tenderness. There is no rebound and no guarding. No hernia.   Musculoskeletal: Normal range of motion. She exhibits no edema or tenderness.   Lymphadenopathy:     She has no cervical adenopathy.   Neurological: She is alert and oriented to person, place, and time. She has normal reflexes. She displays normal reflexes. No cranial nerve deficit. She exhibits normal muscle tone. Coordination normal.   Skin: Skin is warm and dry. No rash noted. She is not diaphoretic. No erythema. No pallor.   Psychiatric: She has a normal mood and affect. Her behavior is normal. Judgment and thought content normal.       Assessment:       1. Essential hypertension    2. Pure hypercholesterolemia    3. CLL (chronic lymphocytic leukemia)    4. Major depression, recurrent, chronic    5. Gastroesophageal reflux disease without esophagitis    6. 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    7. MCI (mild cognitive  impairment)        Plan:           Za was seen today for follow-up.    Diagnoses and all orders for this visit:    Essential hypertension  -     CBC auto differential; Future  -     Comprehensive metabolic panel; Future  -     CBC auto differential  -     Comprehensive metabolic panel    Pure hypercholesterolemia  -     CBC auto differential; Future  -     Comprehensive metabolic panel; Future  -     CBC auto differential  -     Comprehensive metabolic panel    CLL (chronic lymphocytic leukemia)  -     CBC auto differential; Future  -     CBC auto differential    Major depression, recurrent, chronic    Gastroesophageal reflux disease without esophagitis    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    MCI (mild cognitive impairment)  -     Ambulatory referral to Neurology    MCI  -refer neurology for testing and management    CLL  -labs  -managed hematology    Depression  -controlled - lexapro    GERD  -controlled    IGT  -labs    HLD  -controlled    HTN  -controlled    Osteoporosis  -on fosamax weekly. Side effects of medications have been discussed and patient agreed to proceed with treatment and understands the risks and benefits.]    Spent adequate time in obtaining history and explaining differentials    40 minutes spent during this visit of which greater than 50% devoted to face-face counseling and coordination of care regarding diagnosis and management plan    RTC 6 m for physicals

## 2019-07-30 ENCOUNTER — TELEPHONE (OUTPATIENT)
Dept: FAMILY MEDICINE | Facility: CLINIC | Age: 79
End: 2019-07-30

## 2019-07-30 LAB
ALBUMIN SERPL-MCNC: 4.5 G/DL (ref 3.6–5.1)
ALBUMIN/GLOB SERPL: 2 (CALC) (ref 1–2.5)
ALP SERPL-CCNC: 65 U/L (ref 33–130)
ALT SERPL-CCNC: 12 U/L (ref 6–29)
AST SERPL-CCNC: 20 U/L (ref 10–35)
BASOPHILS # BLD AUTO: 62 CELLS/UL (ref 0–200)
BASOPHILS NFR BLD AUTO: 0.4 %
BILIRUB SERPL-MCNC: 0.5 MG/DL (ref 0.2–1.2)
BUN SERPL-MCNC: 23 MG/DL (ref 7–25)
BUN/CREAT SERPL: 20 (CALC) (ref 6–22)
CALCIUM SERPL-MCNC: 9.4 MG/DL (ref 8.6–10.4)
CHLORIDE SERPL-SCNC: 106 MMOL/L (ref 98–110)
CO2 SERPL-SCNC: 28 MMOL/L (ref 20–32)
CREAT SERPL-MCNC: 1.15 MG/DL (ref 0.6–0.88)
EOSINOPHIL # BLD AUTO: 187 CELLS/UL (ref 15–500)
EOSINOPHIL NFR BLD AUTO: 1.2 %
ERYTHROCYTE [DISTWIDTH] IN BLOOD BY AUTOMATED COUNT: 13.3 % (ref 11–15)
GFRSERPLBLD MDRD-ARVRAT: 45 ML/MIN/1.73M2
GLOBULIN SER CALC-MCNC: 2.3 G/DL (CALC) (ref 1.9–3.7)
GLUCOSE SERPL-MCNC: 93 MG/DL (ref 65–99)
HCT VFR BLD AUTO: 34.6 % (ref 35–45)
HGB BLD-MCNC: 11.3 G/DL (ref 11.7–15.5)
LYMPHOCYTES # BLD AUTO: 9500 CELLS/UL (ref 850–3900)
LYMPHOCYTES NFR BLD AUTO: 60.9 %
MCH RBC QN AUTO: 30.8 PG (ref 27–33)
MCHC RBC AUTO-ENTMCNC: 32.7 G/DL (ref 32–36)
MCV RBC AUTO: 94.3 FL (ref 80–100)
MONOCYTES # BLD AUTO: 1435 CELLS/UL (ref 200–950)
MONOCYTES NFR BLD AUTO: 9.2 %
NEUTROPHILS # BLD AUTO: 4415 CELLS/UL (ref 1500–7800)
NEUTROPHILS NFR BLD AUTO: 28.3 %
PLATELET # BLD AUTO: 226 THOUSAND/UL (ref 140–400)
PMV BLD REES-ECKER: 12.1 FL (ref 7.5–12.5)
POTASSIUM SERPL-SCNC: 5.4 MMOL/L (ref 3.5–5.3)
PROT SERPL-MCNC: 6.8 G/DL (ref 6.1–8.1)
RBC # BLD AUTO: 3.67 MILLION/UL (ref 3.8–5.1)
SERVICE CMNT-IMP: ABNORMAL
SODIUM SERPL-SCNC: 141 MMOL/L (ref 135–146)
WBC # BLD AUTO: 15.6 THOUSAND/UL (ref 3.8–10.8)

## 2019-07-30 NOTE — TELEPHONE ENCOUNTER
----- Message from Mina Solorzano MD sent at 7/30/2019  3:01 PM CDT -----  Call    Labs baseline and potassium slightly high - will monitor

## 2019-07-30 NOTE — TELEPHONE ENCOUNTER
Informed pt her labs were baseline and potassium slightly high. Dr. Solorzano will monitor. Pt verbalized understanding.

## 2019-12-19 ENCOUNTER — OFFICE VISIT (OUTPATIENT)
Dept: CARDIOLOGY | Facility: CLINIC | Age: 79
End: 2019-12-19
Payer: MEDICARE

## 2019-12-19 VITALS
HEIGHT: 65 IN | BODY MASS INDEX: 28.57 KG/M2 | DIASTOLIC BLOOD PRESSURE: 71 MMHG | SYSTOLIC BLOOD PRESSURE: 122 MMHG | HEART RATE: 97 BPM | WEIGHT: 171.5 LBS | OXYGEN SATURATION: 97 %

## 2019-12-19 DIAGNOSIS — C91.10 CLL (CHRONIC LYMPHOCYTIC LEUKEMIA): ICD-10-CM

## 2019-12-19 DIAGNOSIS — I10 ESSENTIAL HYPERTENSION: Primary | ICD-10-CM

## 2019-12-19 DIAGNOSIS — E78.00 PURE HYPERCHOLESTEROLEMIA: ICD-10-CM

## 2019-12-19 DIAGNOSIS — I44.4 LEFT ANTERIOR HEMIBLOCK: ICD-10-CM

## 2019-12-19 DIAGNOSIS — R06.09 DYSPNEA ON EXERTION: ICD-10-CM

## 2019-12-19 DIAGNOSIS — R07.89 OTHER CHEST PAIN: ICD-10-CM

## 2019-12-19 DIAGNOSIS — I44.1 2ND DEGREE AV BLOCK: ICD-10-CM

## 2019-12-19 DIAGNOSIS — I44.0 FIRST DEGREE AV BLOCK: ICD-10-CM

## 2019-12-19 DIAGNOSIS — I47.10 PSVT (PAROXYSMAL SUPRAVENTRICULAR TACHYCARDIA): ICD-10-CM

## 2019-12-19 DIAGNOSIS — R94.31 NONSPECIFIC ABNORMAL ELECTROCARDIOGRAM (ECG) (EKG): ICD-10-CM

## 2019-12-19 DIAGNOSIS — I49.1 PREMATURE ATRIAL CONTRACTIONS: ICD-10-CM

## 2019-12-19 DIAGNOSIS — Z86.79 HISTORY OF PSVT (PAROXYSMAL SUPRAVENTRICULAR TACHYCARDIA): ICD-10-CM

## 2019-12-19 PROCEDURE — 99499 UNLISTED E&M SERVICE: CPT | Mod: S$GLB,,, | Performed by: INTERNAL MEDICINE

## 2019-12-19 PROCEDURE — 3078F PR MOST RECENT DIASTOLIC BLOOD PRESSURE < 80 MM HG: ICD-10-PCS | Mod: CPTII,S$GLB,, | Performed by: INTERNAL MEDICINE

## 2019-12-19 PROCEDURE — 1101F PR PT FALLS ASSESS DOC 0-1 FALLS W/OUT INJ PAST YR: ICD-10-PCS | Mod: CPTII,S$GLB,, | Performed by: INTERNAL MEDICINE

## 2019-12-19 PROCEDURE — 99999 PR PBB SHADOW E&M-EST. PATIENT-LVL III: CPT | Mod: PBBFAC,,, | Performed by: INTERNAL MEDICINE

## 2019-12-19 PROCEDURE — 1101F PT FALLS ASSESS-DOCD LE1/YR: CPT | Mod: CPTII,S$GLB,, | Performed by: INTERNAL MEDICINE

## 2019-12-19 PROCEDURE — 1159F MED LIST DOCD IN RCRD: CPT | Mod: S$GLB,,, | Performed by: INTERNAL MEDICINE

## 2019-12-19 PROCEDURE — 3074F PR MOST RECENT SYSTOLIC BLOOD PRESSURE < 130 MM HG: ICD-10-PCS | Mod: CPTII,S$GLB,, | Performed by: INTERNAL MEDICINE

## 2019-12-19 PROCEDURE — 3078F DIAST BP <80 MM HG: CPT | Mod: CPTII,S$GLB,, | Performed by: INTERNAL MEDICINE

## 2019-12-19 PROCEDURE — 1159F PR MEDICATION LIST DOCUMENTED IN MEDICAL RECORD: ICD-10-PCS | Mod: S$GLB,,, | Performed by: INTERNAL MEDICINE

## 2019-12-19 PROCEDURE — 3074F SYST BP LT 130 MM HG: CPT | Mod: CPTII,S$GLB,, | Performed by: INTERNAL MEDICINE

## 2019-12-19 PROCEDURE — 99214 OFFICE O/P EST MOD 30 MIN: CPT | Mod: 25,S$GLB,, | Performed by: INTERNAL MEDICINE

## 2019-12-19 PROCEDURE — 93000 ELECTROCARDIOGRAM COMPLETE: CPT | Mod: S$GLB,,, | Performed by: INTERNAL MEDICINE

## 2019-12-19 PROCEDURE — 99999 PR PBB SHADOW E&M-EST. PATIENT-LVL III: ICD-10-PCS | Mod: PBBFAC,,, | Performed by: INTERNAL MEDICINE

## 2019-12-19 PROCEDURE — 99499 RISK ADDL DX/OHS AUDIT: ICD-10-PCS | Mod: S$GLB,,, | Performed by: INTERNAL MEDICINE

## 2019-12-19 PROCEDURE — 93000 EKG 12-LEAD: ICD-10-PCS | Mod: S$GLB,,, | Performed by: INTERNAL MEDICINE

## 2019-12-19 PROCEDURE — 99214 PR OFFICE/OUTPT VISIT, EST, LEVL IV, 30-39 MIN: ICD-10-PCS | Mod: 25,S$GLB,, | Performed by: INTERNAL MEDICINE

## 2019-12-19 NOTE — PROGRESS NOTES
"  Subjective:      Patient ID: Za Limon is a 79 y.o. female.    Chief Complaint: Follow-up (Hypertension)    HPI:  Cleans house.    Shops at grocery store.    Walks on treadmill.    Pt c/o left sided chest discomfort radiating to the back.  "It doesn't last all day."  Pt attributed to chest congestion or arthritis.  There is no hx of chest pain with exertion.    Pt feels a little short of breath when walking too fast.    No weak spells or faint    Pt c/o chronic cough.    "Coffee makes my heart beat fast" for 20 to 30 minutes.    Pt c/o chronic cough    Review of Systems   Cardiovascular: Positive for chest pain, dyspnea on exertion, leg swelling (Left foot swells at times chronically) and palpitations. Negative for claudication, irregular heartbeat, near-syncope, orthopnea and syncope.        Past Medical History:   Diagnosis Date    2nd degree AV block 5/6/2016    CLL (chronic lymphocytic leukemia)     Hyperlipidemia     Hypertension         Past Surgical History:   Procedure Laterality Date    HYSTERECTOMY      left leg laser surgery         Family History   Problem Relation Age of Onset    Stroke Father     Heart disease Sister     Heart disease Brother        Social History     Socioeconomic History    Marital status:      Spouse name: Not on file    Number of children: Not on file    Years of education: Not on file    Highest education level: Not on file   Occupational History    Not on file   Social Needs    Financial resource strain: Not on file    Food insecurity:     Worry: Not on file     Inability: Not on file    Transportation needs:     Medical: Not on file     Non-medical: Not on file   Tobacco Use    Smoking status: Never Smoker    Smokeless tobacco: Never Used   Substance and Sexual Activity    Alcohol use: Not on file    Drug use: Not on file    Sexual activity: Not on file   Lifestyle    Physical activity:     Days per week: Not on file     Minutes per session: " "Not on file    Stress: Not on file   Relationships    Social connections:     Talks on phone: Not on file     Gets together: Not on file     Attends Christianity service: Not on file     Active member of club or organization: Not on file     Attends meetings of clubs or organizations: Not on file     Relationship status: Not on file   Other Topics Concern    Not on file   Social History Narrative    Not on file       Current Outpatient Medications on File Prior to Visit   Medication Sig Dispense Refill    acetaminophen (TYLENOL) 500 MG tablet Take 500 mg by mouth every 6 (six) hours as needed for Pain.      alendronate (FOSAMAX) 70 MG tablet Take 1 tablet (70 mg total) by mouth every 7 days. 12 tablet 3    aspirin (ECOTRIN) 81 MG EC tablet Take 81 mg by mouth once daily.      diphenhydrAMINE (BENADRYL) 25 mg capsule Take 25 mg by mouth nightly as needed for Itching.      escitalopram oxalate (LEXAPRO) 10 MG tablet Take 1 tablet (10 mg total) by mouth once daily. 90 tablet 3    guaifenesin/dextromethorphan (ROBITUSSIN-DM ORAL) Take by mouth every evening.      losartan-hydrochlorothiazide 100-12.5 mg (HYZAAR) 100-12.5 mg Tab Take 1 tablet by mouth once daily. One half tablet daily 90 tablet 3    ranitidine (ZANTAC) 150 MG tablet Take 1 tablet (150 mg total) by mouth 2 (two) times daily. 60 tablet 11    rosuvastatin (CRESTOR) 40 MG Tab Take 1 tablet (40 mg total) by mouth every evening. 90 tablet 3     No current facility-administered medications on file prior to visit.        Review of patient's allergies indicates:   Allergen Reactions    Codeine      Objective:     Vitals:    12/19/19 0925   BP: 122/71   BP Location: Left arm   Patient Position: Sitting   BP Method: Large (Automatic)   Pulse: 97   SpO2: 97%   Weight: 77.8 kg (171 lb 8.3 oz)   Height: 5' 5" (1.651 m)        Physical Exam   Constitutional: She is oriented to person, place, and time. She appears well-developed and well-nourished.   Eyes: No " scleral icterus.   Neck: No JVD present. Carotid bruit is not present.   Cardiovascular: Normal rate and regular rhythm.  Occasional extrasystoles are present. Exam reveals no gallop.   No murmur heard.  Pulmonary/Chest: Breath sounds normal.   Musculoskeletal: She exhibits no edema.   Neurological: She is alert and oriented to person, place, and time.   Skin: Skin is warm and dry.   Psychiatric: She has a normal mood and affect. Her behavior is normal. Judgment and thought content normal.   Vitals reviewed.     Wt unchanged since 1/19    Note echo 2016 showed normal LVEF    Note stress ECG 2016 was negative for ischemia    Note holter in 2016 showed PSVT and Mobitz I second degree AV block    Last hgba1c 5.6 (non-diabetic)  Lab 4 months ago creat 1.15, K 5.4    ECG today: NSR with frequent and consecutive PAC's, poor R wave progression in precordial leads.  Unchanged compared with last tracing    Assessment:     1. Essential hypertension    2. Pure hypercholesterolemia    3. Left anterior hemiblock    4. Premature atrial contractions    5. First degree AV block    6. History of PSVT (paroxysmal supraventricular tachycardia)    7. 2nd degree AV block    8. CLL (chronic lymphocytic leukemia)    9. Other chest pain    10. Dyspnea on exertion    11. Nonspecific abnormal electrocardiogram (ECG) (EKG)    12. PSVT (paroxysmal supraventricular tachycardia)      Plan:   Za was seen today for follow-up.    Diagnoses and all orders for this visit:    Essential hypertension  -     IN OFFICE EKG 12-LEAD (to Muse)  -     Stress Echo Which stress agent will be used? Treadmill Exercise; Color Flow Doppler? No; Future  -     Holter monitor - 24 hour; Future  -     CBC auto differential; Future  -     Comprehensive metabolic panel; Future  -     Lipid panel; Future  -     TSH; Future  -     T4, free; Future    Pure hypercholesterolemia  -     CBC auto differential; Future  -     Comprehensive metabolic panel; Future  -     Lipid  panel; Future  -     TSH; Future  -     T4, free; Future    Left anterior hemiblock  -     IN OFFICE EKG 12-LEAD (to Muse)  -     Stress Echo Which stress agent will be used? Treadmill Exercise; Color Flow Doppler? No; Future  -     Holter monitor - 24 hour; Future    Premature atrial contractions  -     IN OFFICE EKG 12-LEAD (to Muse)  -     Stress Echo Which stress agent will be used? Treadmill Exercise; Color Flow Doppler? No; Future  -     Holter monitor - 24 hour; Future    First degree AV block  -     IN OFFICE EKG 12-LEAD (to Muse)  -     Stress Echo Which stress agent will be used? Treadmill Exercise; Color Flow Doppler? No; Future  -     Holter monitor - 24 hour; Future    History of PSVT (paroxysmal supraventricular tachycardia)  -     IN OFFICE EKG 12-LEAD (to Muse)  -     Stress Echo Which stress agent will be used? Treadmill Exercise; Color Flow Doppler? No; Future  -     Holter monitor - 24 hour; Future  -     CBC auto differential; Future  -     Comprehensive metabolic panel; Future  -     Lipid panel; Future  -     TSH; Future  -     T4, free; Future    2nd degree AV block  -     IN OFFICE EKG 12-LEAD (to Muse)  -     Stress Echo Which stress agent will be used? Treadmill Exercise; Color Flow Doppler? No; Future  -     Holter monitor - 24 hour; Future    CLL (chronic lymphocytic leukemia)    Other chest pain  -     Stress Echo Which stress agent will be used? Treadmill Exercise; Color Flow Doppler? No; Future    Dyspnea on exertion  -     Stress Echo Which stress agent will be used? Treadmill Exercise; Color Flow Doppler? No; Future    Nonspecific abnormal electrocardiogram (ECG) (EKG)  -     Stress Echo Which stress agent will be used? Treadmill Exercise; Color Flow Doppler? No; Future    PSVT (paroxysmal supraventricular tachycardia)  -     IN OFFICE EKG 12-LEAD (to Muse)     Will repeat holter to evaluate palpitations with hx of PSVT and Mobitz I second degree AV block  Will repeat stress (with  echo) to evaluate chest pain and abnormal ECG  Same meds  F/u with Dr Solorzano next month who does labs  F/u with Dr Iqbal who monitors CLL  Follow up in about 6 months (around 6/19/2020).

## 2019-12-24 DIAGNOSIS — M81.0 OSTEOPOROSIS, UNSPECIFIED OSTEOPOROSIS TYPE, UNSPECIFIED PATHOLOGICAL FRACTURE PRESENCE: ICD-10-CM

## 2019-12-26 RX ORDER — ALENDRONATE SODIUM 70 MG/1
TABLET ORAL
Qty: 12 TABLET | Refills: 3 | Status: SHIPPED | OUTPATIENT
Start: 2019-12-26 | End: 2020-01-14 | Stop reason: SDUPTHER

## 2020-01-14 DIAGNOSIS — K21.9 GASTROESOPHAGEAL REFLUX DISEASE WITHOUT ESOPHAGITIS: ICD-10-CM

## 2020-01-14 DIAGNOSIS — M81.0 OSTEOPOROSIS, UNSPECIFIED OSTEOPOROSIS TYPE, UNSPECIFIED PATHOLOGICAL FRACTURE PRESENCE: ICD-10-CM

## 2020-01-14 RX ORDER — OMEPRAZOLE 20 MG/1
CAPSULE, DELAYED RELEASE ORAL
Qty: 90 CAPSULE | Refills: 3 | Status: SHIPPED | OUTPATIENT
Start: 2020-01-14 | End: 2020-07-15 | Stop reason: SDUPTHER

## 2020-01-14 RX ORDER — ALENDRONATE SODIUM 70 MG/1
TABLET ORAL
Qty: 12 TABLET | Refills: 1 | Status: SHIPPED | OUTPATIENT
Start: 2020-01-14 | End: 2020-09-21

## 2020-01-14 NOTE — TELEPHONE ENCOUNTER
Spoke to pt and states that she was previously taking the Omeprazole 20mg and would like to start to again. Pls advise.

## 2020-01-14 NOTE — TELEPHONE ENCOUNTER
----- Message from Tamiko Mclain sent at 1/14/2020  1:26 PM CST -----  Contact: Self [phone]  Patient is calling to talk to nurse in regards to see if its okay to take the omeprazole for her stomach.

## 2020-02-17 DIAGNOSIS — E78.2 MIXED HYPERLIPIDEMIA: ICD-10-CM

## 2020-02-17 RX ORDER — ROSUVASTATIN CALCIUM 40 MG/1
TABLET, COATED ORAL
Qty: 90 TABLET | Refills: 3 | Status: SHIPPED | OUTPATIENT
Start: 2020-02-17 | End: 2021-02-22 | Stop reason: SDUPTHER

## 2020-02-21 ENCOUNTER — HOSPITAL ENCOUNTER (INPATIENT)
Facility: HOSPITAL | Age: 80
LOS: 3 days | Discharge: HOME HEALTH | DRG: 312 | End: 2020-02-24
Attending: EMERGENCY MEDICINE | Admitting: INTERNAL MEDICINE
Payer: MEDICARE

## 2020-02-21 DIAGNOSIS — C91.10 CLL (CHRONIC LYMPHOCYTIC LEUKEMIA): ICD-10-CM

## 2020-02-21 DIAGNOSIS — S12.601A CLOSED NONDISPLACED FRACTURE OF SEVENTH CERVICAL VERTEBRA, UNSPECIFIED FRACTURE MORPHOLOGY, INITIAL ENCOUNTER: ICD-10-CM

## 2020-02-21 DIAGNOSIS — R55 SYNCOPE AND COLLAPSE: ICD-10-CM

## 2020-02-21 DIAGNOSIS — R55 SYNCOPE, UNSPECIFIED SYNCOPE TYPE: ICD-10-CM

## 2020-02-21 DIAGNOSIS — R55 SYNCOPE: Primary | ICD-10-CM

## 2020-02-21 DIAGNOSIS — I44.1 WENCKEBACH SECOND DEGREE AV BLOCK: ICD-10-CM

## 2020-02-21 DIAGNOSIS — I10 ESSENTIAL HYPERTENSION: ICD-10-CM

## 2020-02-21 LAB
ANION GAP SERPL CALC-SCNC: 14 MMOL/L (ref 8–16)
BASOPHILS NFR BLD: 1 % (ref 0–1.9)
BILIRUB UR QL STRIP: NEGATIVE
BUN SERPL-MCNC: 20 MG/DL (ref 8–23)
CALCIUM SERPL-MCNC: 10 MG/DL (ref 8.7–10.5)
CHLORIDE SERPL-SCNC: 105 MMOL/L (ref 95–110)
CLARITY UR: CLEAR
CO2 SERPL-SCNC: 19 MMOL/L (ref 23–29)
COLOR UR: YELLOW
CREAT SERPL-MCNC: 1.4 MG/DL (ref 0.5–1.4)
DIFFERENTIAL METHOD: ABNORMAL
EOSINOPHIL NFR BLD: 0 % (ref 0–8)
ERYTHROCYTE [DISTWIDTH] IN BLOOD BY AUTOMATED COUNT: 13.2 % (ref 11.5–14.5)
EST. GFR  (AFRICAN AMERICAN): 41 ML/MIN/1.73 M^2
EST. GFR  (NON AFRICAN AMERICAN): 36 ML/MIN/1.73 M^2
GLUCOSE SERPL-MCNC: 103 MG/DL (ref 70–110)
GLUCOSE UR QL STRIP: NEGATIVE
HCT VFR BLD AUTO: 37.8 % (ref 37–48.5)
HGB BLD-MCNC: 12.1 G/DL (ref 12–16)
HGB UR QL STRIP: NEGATIVE
IMM GRANULOCYTES # BLD AUTO: ABNORMAL K/UL (ref 0–0.04)
IMM GRANULOCYTES NFR BLD AUTO: ABNORMAL % (ref 0–0.5)
KETONES UR QL STRIP: NEGATIVE
LEUKOCYTE ESTERASE UR QL STRIP: NEGATIVE
LYMPHOCYTES NFR BLD: 28 % (ref 18–48)
MAGNESIUM SERPL-MCNC: 2.3 MG/DL (ref 1.6–2.6)
MCH RBC QN AUTO: 30.9 PG (ref 27–31)
MCHC RBC AUTO-ENTMCNC: 32 G/DL (ref 32–36)
MCV RBC AUTO: 97 FL (ref 82–98)
MONOCYTES NFR BLD: 5 % (ref 4–15)
NEUTROPHILS NFR BLD: 66 % (ref 38–73)
NITRITE UR QL STRIP: NEGATIVE
NRBC BLD-RTO: 0 /100 WBC
PH UR STRIP: 6 [PH] (ref 5–8)
PHOSPHATE SERPL-MCNC: 3.4 MG/DL (ref 2.7–4.5)
PLATELET # BLD AUTO: 186 K/UL (ref 150–350)
PMV BLD AUTO: 12.2 FL (ref 9.2–12.9)
POCT GLUCOSE: 104 MG/DL (ref 70–110)
POTASSIUM SERPL-SCNC: 4.3 MMOL/L (ref 3.5–5.1)
PROT UR QL STRIP: NEGATIVE
RBC # BLD AUTO: 3.91 M/UL (ref 4–5.4)
SODIUM SERPL-SCNC: 138 MMOL/L (ref 136–145)
SP GR UR STRIP: 1.01 (ref 1–1.03)
TROPONIN I SERPL DL<=0.01 NG/ML-MCNC: 0.01 NG/ML (ref 0–0.03)
TROPONIN I SERPL DL<=0.01 NG/ML-MCNC: 0.02 NG/ML (ref 0–0.03)
URN SPEC COLLECT METH UR: NORMAL
UROBILINOGEN UR STRIP-ACNC: NEGATIVE EU/DL
WBC # BLD AUTO: 20.5 K/UL (ref 3.9–12.7)

## 2020-02-21 PROCEDURE — 99285 EMERGENCY DEPT VISIT HI MDM: CPT | Mod: 25

## 2020-02-21 PROCEDURE — 81003 URINALYSIS AUTO W/O SCOPE: CPT

## 2020-02-21 PROCEDURE — 84484 ASSAY OF TROPONIN QUANT: CPT | Mod: 91

## 2020-02-21 PROCEDURE — 85060 BLOOD SMEAR INTERPRETATION: CPT | Mod: ,,, | Performed by: PATHOLOGY

## 2020-02-21 PROCEDURE — 80048 BASIC METABOLIC PNL TOTAL CA: CPT

## 2020-02-21 PROCEDURE — 94761 N-INVAS EAR/PLS OXIMETRY MLT: CPT

## 2020-02-21 PROCEDURE — 36415 COLL VENOUS BLD VENIPUNCTURE: CPT

## 2020-02-21 PROCEDURE — 85027 COMPLETE CBC AUTOMATED: CPT

## 2020-02-21 PROCEDURE — 83735 ASSAY OF MAGNESIUM: CPT

## 2020-02-21 PROCEDURE — 85007 BL SMEAR W/DIFF WBC COUNT: CPT

## 2020-02-21 PROCEDURE — 82962 GLUCOSE BLOOD TEST: CPT

## 2020-02-21 PROCEDURE — 20000000 HC ICU ROOM

## 2020-02-21 PROCEDURE — 84100 ASSAY OF PHOSPHORUS: CPT

## 2020-02-21 PROCEDURE — 84484 ASSAY OF TROPONIN QUANT: CPT

## 2020-02-21 PROCEDURE — 85060 PATHOLOGIST REVIEW: ICD-10-PCS | Mod: ,,, | Performed by: PATHOLOGY

## 2020-02-21 RX ORDER — SODIUM CHLORIDE 0.9 % (FLUSH) 0.9 %
10 SYRINGE (ML) INJECTION
Status: DISCONTINUED | OUTPATIENT
Start: 2020-02-21 | End: 2020-02-24 | Stop reason: HOSPADM

## 2020-02-21 RX ORDER — PANTOPRAZOLE SODIUM 40 MG/1
40 TABLET, DELAYED RELEASE ORAL DAILY
Status: DISCONTINUED | OUTPATIENT
Start: 2020-02-22 | End: 2020-02-24 | Stop reason: HOSPADM

## 2020-02-21 RX ORDER — ASPIRIN 81 MG/1
81 TABLET ORAL DAILY
Status: DISCONTINUED | OUTPATIENT
Start: 2020-02-22 | End: 2020-02-24 | Stop reason: HOSPADM

## 2020-02-21 RX ORDER — ATROPINE SULFATE 0.1 MG/ML
0.4 INJECTION INTRAVENOUS EVERY 5 MIN PRN
Status: DISCONTINUED | OUTPATIENT
Start: 2020-02-21 | End: 2020-02-24 | Stop reason: HOSPADM

## 2020-02-21 RX ORDER — ROSUVASTATIN CALCIUM 10 MG/1
40 TABLET, COATED ORAL NIGHTLY
Status: DISCONTINUED | OUTPATIENT
Start: 2020-02-21 | End: 2020-02-24 | Stop reason: HOSPADM

## 2020-02-21 RX ORDER — LOSARTAN POTASSIUM 50 MG/1
100 TABLET ORAL DAILY
Status: DISCONTINUED | OUTPATIENT
Start: 2020-02-21 | End: 2020-02-24 | Stop reason: HOSPADM

## 2020-02-21 RX ORDER — LOSARTAN POTASSIUM AND HYDROCHLOROTHIAZIDE 12.5; 1 MG/1; MG/1
1 TABLET ORAL DAILY
Status: DISCONTINUED | OUTPATIENT
Start: 2020-02-21 | End: 2020-02-21

## 2020-02-21 RX ORDER — HYDROCHLOROTHIAZIDE 12.5 MG/1
12.5 TABLET ORAL DAILY
Status: DISCONTINUED | OUTPATIENT
Start: 2020-02-21 | End: 2020-02-22

## 2020-02-21 NOTE — ED PROVIDER NOTES
Encounter Date: 2/21/2020    SCRIBE #1 NOTE: I, Madhuri Awad, am scribing for, and in the presence of, Dr. Osorio .       History     Chief Complaint   Patient presents with    Loss of Consciousness     pt BIB EMS with reports of syncope at [facility]; pt c/o dizziness and fatigue, denies any cp, sob    Headache     Time seen by provider: 4:08 PM    This is a 79 y.o. female who presents with complaint of a syncope episode while at the grocery store today. The patient believes that she was out for approximately 1 minute. She did fall backward and hit her head. Before the episode she remembers feeling dizzy and having blurred vision. She did not loose bowel or bladder control during the episode and she does not report having seizure like activity. Currently she is having some behind the neck pain. She denies CP, SOB, nausea and abdominal pain prior to or after the syncope episode. This morning she did eat breakfast at 900. The patient has had syncopal episodes before but she mentions that usually she is able to bounce back quickly. The patient does not have hip pain and she was able to walk after passing out. She does live by herself and states that she had been doing so for 18 years.        The history is provided by the patient.     Review of patient's allergies indicates:   Allergen Reactions    Codeine      Past Medical History:   Diagnosis Date    2nd degree AV block 5/6/2016    CLL (chronic lymphocytic leukemia)     Hyperlipidemia     Hypertension      Past Surgical History:   Procedure Laterality Date    HYSTERECTOMY      left leg laser surgery       Family History   Problem Relation Age of Onset    Stroke Father     Heart disease Sister     Heart disease Brother      Social History     Tobacco Use    Smoking status: Never Smoker    Smokeless tobacco: Never Used   Substance Use Topics    Alcohol use: Not on file    Drug use: Not on file     Review of Systems      General: No fever.  No chills.  Positive for fatigue. Positive for generalized weakness.   HENT: Positive for sinus pressure.   Eyes: Positive for visual changes.  Head: No headache.    Integument: No rashes or lesions.  Chest: No shortness of breath.  Cardiovascular: No chest pain.  Abdomen: No abdominal pain.  No nausea or vomiting.   GI: No bowel incontinence.   Urinary: No abnormal urination.  Neurologic: No focal weakness.  No numbness. No seizure. Positive for syncope. Positive for dizziness.   Musculoskeletal: Positive for neck pain. Negative for hip pain.   Hematologic: No easy bruising.  Endocrine: No excessive thirst or urination.    Physical Exam     Initial Vitals [02/21/20 1551]   BP Pulse Resp Temp SpO2   (!) 158/69 66 17 97.6 °F (36.4 °C) 100 %      MAP       --         Physical Exam    Primary Survey:  Airway intact and protected  Breath sounds intact bilaterally, no respiratory distress  Equal palpable carotids, radials, femorals, dorsalis pedis bilaterally.    Appearance: No acute distress.  Head: Atraumatic, no tenderness.  Negative sanchez sign, no other bruising.  Neck: +lower cervical spine tenderness, but no step-off or deformity. Placed in c-collar. No soft tissue tenderness.  Back: No thoracic, lumbar or sacral spine tenderness, step-off or deformity.  No soft tissue tenderness.  Chest: No chest wall tenderness.  Breath sounds are equal bilaterally.  No wheezes.  No rhonchi.  No rales.  Cardiovascular: Regular rate and rhythm.  No murmurs.  No gallops.  No rubs.  Abdomen: Soft. Nontender. No distention.  No guarding. No rebound.  No ecchymoses.  Skin: No ecchymoses or other signs of trauma.  Musculoskeletal: Good range of motion of all joints.  No bony tenderness in the extremities.  No deformities.  No soft tissue tenderness.  Neurologic: Equal strength in upper and lower extremities bilaterally.  Normal sensation.  No facial droop.  Normal speech.    Mental status: Alert and oriented x 3.  GCS 15.      ED Course    Procedures  Labs Reviewed   CBC W/ AUTO DIFFERENTIAL - Abnormal; Notable for the following components:       Result Value    WBC 20.50 (*)     RBC 3.91 (*)     All other components within normal limits   BASIC METABOLIC PANEL - Abnormal; Notable for the following components:    CO2 19 (*)     eGFR if  41 (*)     eGFR if non  36 (*)     All other components within normal limits   URINALYSIS, REFLEX TO URINE CULTURE    Narrative:     Preferred Collection Type->Urine, Clean Catch   TROPONIN I   MAGNESIUM   PHOSPHORUS   MAGNESIUM   PHOSPHORUS   POCT GLUCOSE   POCT GLUCOSE MONITORING CONTINUOUS          Imaging Results    None          Medical Decision Making:   History:   Old Medical Records: I decided to obtain old medical records.  Independently Interpreted Test(s):   I have ordered and independently interpreted EKG Reading(s) - see prior notes  Clinical Tests:   Lab Tests: Ordered and Reviewed  Medical Tests: Ordered and Reviewed            Scribe Attestation:   Scribe #1: I performed the above scribed service and the documentation accurately describes the services I performed. I attest to the accuracy of the note.    Attending Attestation:           Physician Attestation for Scribe:  Physician Attestation Statement for Scribe #1: I, Dr. Osorio , reviewed documentation, as scribed by Madhuri Awad  in my presence, and it is both accurate and complete.         I, Dr. Hilda Osorio, personally performed the services described in this documentation. All medical record entries made by the scribe were at my direction and in my presence.  I have reviewed the chart and agree that the record reflects my personal performance and is accurate and complete. Hilda Osorio MD.  4:08 PM 02/21/2020                        Clinical Impression:     1. Syncope    2. Syncope, unspecified syncope type    3. Wenckebach second degree AV block    4. Closed nondisplaced fracture of seventh cervical vertebra,  unspecified fracture morphology, initial encounter      80 yo female presents to ED for evaluation of syncope. Exam shows bradycardia, otherwise VSS. +ttp c-spine, but no step-off. Remainder of trauma evaluation normal. EKG 2nd AVB-type 1, no ST changes. Labs w/leukocytosis c/w known CLL, otherwise reassuring. CTH WNL. CT C spine with non-displaced fx of c7 osteophyte involving super endplate -- discussed with NSG on call who scheduled pt for f/u in Wed, advised MR prior to this appt, and keep c-collar on at all times until then. Discussed case with cardiologist who recommends ICU admit for tele in setting of symptomatic AVB and discussed with hospitalist for admission.                        Hilda Osorio MD  02/22/20 [id number]

## 2020-02-21 NOTE — ED NOTES
Spoke with ED physician regarding urine sample and orthostatic orders, advised to wait until pt CT scan has been resulted to complete orders.

## 2020-02-21 NOTE — ED TRIAGE NOTES
"Pt came to the ED via EMS after a syncopal episode at the grocery store today.  Pt states she was finished shopping and suddenly started to feel light headed and "sick", only remembers waking up on the ground after that surrounded by people.  Pt is insure of how long she was unconscious for.  Pt has a hx of SVT and is showing Afib on the monitor upon arrival. Pt also reports that this has happened to her in the past.  Pt also report that she was diagnosed with the flu earlier this week and has been suffering from sinus headaches for the past few weeks'  "

## 2020-02-22 LAB
ALBUMIN SERPL BCP-MCNC: 4.2 G/DL (ref 3.5–5.2)
ALP SERPL-CCNC: 53 U/L (ref 55–135)
ALT SERPL W/O P-5'-P-CCNC: 17 U/L (ref 10–44)
ANION GAP SERPL CALC-SCNC: 12 MMOL/L (ref 8–16)
ANISOCYTOSIS BLD QL SMEAR: SLIGHT
ASCENDING AORTA: 3.3 CM
AST SERPL-CCNC: 25 U/L (ref 10–40)
AV PEAK GRADIENT: 6 MMHG
AV VELOCITY RATIO: 0.75
BASOPHILS # BLD AUTO: ABNORMAL K/UL (ref 0–0.2)
BASOPHILS NFR BLD: 0 % (ref 0–1.9)
BILIRUB SERPL-MCNC: 0.8 MG/DL (ref 0.1–1)
BSA FOR ECHO PROCEDURE: 1.86 M2
BUN SERPL-MCNC: 18 MG/DL (ref 8–23)
CALCIUM SERPL-MCNC: 9.5 MG/DL (ref 8.7–10.5)
CHLORIDE SERPL-SCNC: 106 MMOL/L (ref 95–110)
CO2 SERPL-SCNC: 20 MMOL/L (ref 23–29)
CREAT SERPL-MCNC: 1.1 MG/DL (ref 0.5–1.4)
CV ECHO LV RWT: 0.4 CM
DIFFERENTIAL METHOD: ABNORMAL
DOP CALC AO PEAK VEL: 1.2 M/S
DOP CALC LVOT AREA: 2.5 CM2
DOP CALC LVOT DIAMETER: 1.8 CM
DOP CALC LVOT PEAK VEL: 0.9 M/S
DOP CALC LVOT STROKE VOLUME: 45.78 CM3
DOP CALCLVOT PEAK VEL VTI: 18 CM
ECHO LV POSTERIOR WALL: 0.8 CM (ref 0.6–1.1)
EOSINOPHIL # BLD AUTO: ABNORMAL K/UL (ref 0–0.5)
EOSINOPHIL NFR BLD: 1 % (ref 0–8)
ERYTHROCYTE [DISTWIDTH] IN BLOOD BY AUTOMATED COUNT: 13.4 % (ref 11.5–14.5)
EST. GFR  (AFRICAN AMERICAN): 55 ML/MIN/1.73 M^2
EST. GFR  (NON AFRICAN AMERICAN): 48 ML/MIN/1.73 M^2
FRACTIONAL SHORTENING: 30 % (ref 28–44)
GLUCOSE SERPL-MCNC: 89 MG/DL (ref 70–110)
HCT VFR BLD AUTO: 37.5 % (ref 37–48.5)
HGB BLD-MCNC: 11.8 G/DL (ref 12–16)
HYPOCHROMIA BLD QL SMEAR: ABNORMAL
IMM GRANULOCYTES # BLD AUTO: ABNORMAL K/UL (ref 0–0.04)
IMM GRANULOCYTES NFR BLD AUTO: ABNORMAL % (ref 0–0.5)
INTERVENTRICULAR SEPTUM: 0.8 CM (ref 0.6–1.1)
LA MAJOR: 4.4 CM
LA MINOR: 4.1 CM
LA WIDTH: 4.4 CM
LEFT ATRIUM SIZE: 2.8 CM
LEFT ATRIUM VOLUME INDEX: 24.2 ML/M2
LEFT ATRIUM VOLUME: 44.45 CM3
LEFT INTERNAL DIMENSION IN SYSTOLE: 2.8 CM (ref 2.1–4)
LEFT VENTRICLE MASS INDEX: 51 G/M2
LEFT VENTRICULAR INTERNAL DIMENSION IN DIASTOLE: 4 CM (ref 3.5–6)
LEFT VENTRICULAR MASS: 93.46 G
LYMPHOCYTES # BLD AUTO: ABNORMAL K/UL (ref 1–4.8)
LYMPHOCYTES NFR BLD: 79 % (ref 18–48)
MCH RBC QN AUTO: 31.3 PG (ref 27–31)
MCHC RBC AUTO-ENTMCNC: 31.5 G/DL (ref 32–36)
MCV RBC AUTO: 100 FL (ref 82–98)
MONOCYTES # BLD AUTO: ABNORMAL K/UL (ref 0.3–1)
MONOCYTES NFR BLD: 5 % (ref 4–15)
NEUTROPHILS NFR BLD: 15 % (ref 38–73)
NRBC BLD-RTO: 0 /100 WBC
OVALOCYTES BLD QL SMEAR: ABNORMAL
PISA TR MAX VEL: 2.21 M/S
PLATELET # BLD AUTO: 156 K/UL (ref 150–350)
PLATELET BLD QL SMEAR: ABNORMAL
PMV BLD AUTO: 11.8 FL (ref 9.2–12.9)
POIKILOCYTOSIS BLD QL SMEAR: SLIGHT
POTASSIUM SERPL-SCNC: 4.1 MMOL/L (ref 3.5–5.1)
PROT SERPL-MCNC: 6.8 G/DL (ref 6–8.4)
RA MAJOR: 4.1 CM
RA WIDTH: 3.9 CM
RBC # BLD AUTO: 3.77 M/UL (ref 4–5.4)
SODIUM SERPL-SCNC: 138 MMOL/L (ref 136–145)
STJ: 3.1 CM
TR MAX PG: 20 MMHG
TROPONIN I SERPL DL<=0.01 NG/ML-MCNC: 0.06 NG/ML (ref 0–0.03)
TROPONIN I SERPL DL<=0.01 NG/ML-MCNC: 0.08 NG/ML (ref 0–0.03)
WBC # BLD AUTO: 21.28 K/UL (ref 3.9–12.7)

## 2020-02-22 PROCEDURE — 25000003 PHARM REV CODE 250: Performed by: STUDENT IN AN ORGANIZED HEALTH CARE EDUCATION/TRAINING PROGRAM

## 2020-02-22 PROCEDURE — 93005 ELECTROCARDIOGRAM TRACING: CPT

## 2020-02-22 PROCEDURE — 25000003 PHARM REV CODE 250: Performed by: INTERNAL MEDICINE

## 2020-02-22 PROCEDURE — 85007 BL SMEAR W/DIFF WBC COUNT: CPT

## 2020-02-22 PROCEDURE — 84484 ASSAY OF TROPONIN QUANT: CPT | Mod: 91

## 2020-02-22 PROCEDURE — 80053 COMPREHEN METABOLIC PANEL: CPT

## 2020-02-22 PROCEDURE — 99291 CRITICAL CARE FIRST HOUR: CPT | Mod: 25,,, | Performed by: INTERNAL MEDICINE

## 2020-02-22 PROCEDURE — 85027 COMPLETE CBC AUTOMATED: CPT

## 2020-02-22 PROCEDURE — 36415 COLL VENOUS BLD VENIPUNCTURE: CPT

## 2020-02-22 PROCEDURE — 99291 PR CRITICAL CARE, E/M 30-74 MINUTES: ICD-10-PCS | Mod: 25,,, | Performed by: INTERNAL MEDICINE

## 2020-02-22 PROCEDURE — 63600175 PHARM REV CODE 636 W HCPCS: Performed by: STUDENT IN AN ORGANIZED HEALTH CARE EDUCATION/TRAINING PROGRAM

## 2020-02-22 PROCEDURE — 94761 N-INVAS EAR/PLS OXIMETRY MLT: CPT

## 2020-02-22 PROCEDURE — 20000000 HC ICU ROOM

## 2020-02-22 RX ORDER — ACETAMINOPHEN 325 MG/1
650 TABLET ORAL EVERY 6 HOURS PRN
Status: DISCONTINUED | OUTPATIENT
Start: 2020-02-22 | End: 2020-02-24 | Stop reason: HOSPADM

## 2020-02-22 RX ORDER — MECLIZINE HCL 12.5 MG 12.5 MG/1
25 TABLET ORAL 3 TIMES DAILY
Status: DISCONTINUED | OUTPATIENT
Start: 2020-02-22 | End: 2020-02-24 | Stop reason: HOSPADM

## 2020-02-22 RX ORDER — SODIUM CHLORIDE, SODIUM LACTATE, POTASSIUM CHLORIDE, CALCIUM CHLORIDE 600; 310; 30; 20 MG/100ML; MG/100ML; MG/100ML; MG/100ML
INJECTION, SOLUTION INTRAVENOUS CONTINUOUS
Status: DISCONTINUED | OUTPATIENT
Start: 2020-02-22 | End: 2020-02-23

## 2020-02-22 RX ORDER — TALC
3 POWDER (GRAM) TOPICAL NIGHTLY PRN
Status: DISCONTINUED | OUTPATIENT
Start: 2020-02-22 | End: 2020-02-24 | Stop reason: HOSPADM

## 2020-02-22 RX ADMIN — Medication 3 MG: at 09:02

## 2020-02-22 RX ADMIN — Medication 3 MG: at 12:02

## 2020-02-22 RX ADMIN — MECLIZINE HYDROCHLORIDE 25 MG: 25 TABLET ORAL at 02:02

## 2020-02-22 RX ADMIN — ROSUVASTATIN CALCIUM 40 MG: 10 TABLET, COATED ORAL at 12:02

## 2020-02-22 RX ADMIN — ROSUVASTATIN CALCIUM 40 MG: 10 TABLET, COATED ORAL at 08:02

## 2020-02-22 RX ADMIN — LOSARTAN POTASSIUM 100 MG: 50 TABLET, FILM COATED ORAL at 08:02

## 2020-02-22 RX ADMIN — MECLIZINE HYDROCHLORIDE 25 MG: 25 TABLET ORAL at 08:02

## 2020-02-22 RX ADMIN — ASPIRIN 81 MG: 81 TABLET, COATED ORAL at 08:02

## 2020-02-22 RX ADMIN — SODIUM CHLORIDE, SODIUM LACTATE, POTASSIUM CHLORIDE, AND CALCIUM CHLORIDE: .6; .31; .03; .02 INJECTION, SOLUTION INTRAVENOUS at 02:02

## 2020-02-22 RX ADMIN — HYDROCHLOROTHIAZIDE 12.5 MG: 12.5 TABLET ORAL at 08:02

## 2020-02-22 RX ADMIN — ACETAMINOPHEN 650 MG: 325 TABLET ORAL at 12:02

## 2020-02-22 RX ADMIN — PANTOPRAZOLE SODIUM 40 MG: 40 TABLET, DELAYED RELEASE ORAL at 08:02

## 2020-02-22 RX ADMIN — ACETAMINOPHEN 650 MG: 325 TABLET ORAL at 09:02

## 2020-02-22 NOTE — PROGRESS NOTES
e-ICU admit note      Reason for ICU admission:    HPI:    78 yo female presented 2/21 for LOC while at the grocery. Before the episode she remembers feeling dizzy and having blurred vision.  C/o sinus pressure andright eye visual distortion   x 3 weeks.    PHx:     2nd degree AV block (5/6/2016)   CLL  Hyperlipidemia   Hypertension..    Home Meds:    ASA  Benadryl  escilatopram  Hyzaar  Omeprazole  Ranitidine  rosuvastatin    Significant labs:    WBC [id number]      Significant images:    CXR:    The lungs are symmetrically expanded bilaterally with coarse interstitial attenuation and left basilar subsegmental atelectasis..  No large focal consolidation seen.     CT head:    Normal    CT C-spine:    Acute nondisplaced fracture involving an anterior vertebral body osteophyte at the level of C7.        ECG:    Mobitz II 50/min        I viewed the pt via camera at  9:55pm:    MD is inreviewing    176/78, 100% sat, RR 18  HR 56/min Mobitz II vs sinus arrest every 3-4 normal p QRS                Assessment/Plan    Cardiogenic Syncope  Mobitz II but at times looks like sinus arrest every 3-4 complexes  Possible pacer 2/22     C7 Spinal Fracture  Traumatic 2/2 to syncopal event day of admit  -Neurosurgery consulted     Leukocytosis  Likely reactive, no signs of infection        Hypertension  Continue losartan-HCTZ 100-12.5mg qd  Hydralazine IV prn     Dyslipidemia  Continue lipitor 80mg qd     GERD  Continue prilosec 20mg qd     Osteoporosis  Continue alendronate 70mg qd         DVT prophylaxis- SCD

## 2020-02-22 NOTE — NURSING
Patient stable and NAD noted, overall condition unchanged and denies sob, pain and n/v and dizziness at this time. VSS see flowsheet. Safety maintained, will continue to monitor.

## 2020-02-22 NOTE — PROGRESS NOTES
"U Internal Medicine Resident HO-III Progress Note    Subjective:      Za Limon is a 79 y.o. AA female who is being followed by the [facility] IM service at [facility] for syncope, likely cardiogenic.     Overnight, no acute issues. Resting comfortably this morning.      Objective:   Last 24 Hour Vital Signs:  BP  Min: 133/66  Max: 203/85  Temp  Av.2 °F (36.8 °C)  Min: 97.6 °F (36.4 °C)  Max: 98.6 °F (37 °C)  Pulse  Av.8  Min: 49  Max: 67  Resp  Av  Min: 9  Max: 36  SpO2  Av.4 %  Min: 87 %  Max: 100 %  Height  Av' 6" (167.6 cm)  Min: 5' 6" (167.6 cm)  Max: 5' 6" (167.6 cm)  Weight  Av.3 kg (170 lb 5.5 oz)  Min: 74.7 kg (164 lb 10.9 oz)  Max: 79.8 kg (176 lb)  I/O last 3 completed shifts:  In: 120 [P.O.:120]  Out: 250 [Urine:250]    Physical Examination:  General:          Alert and awake in no apparent distress  Head:               Normocephalic, some dried blood to hair posterior scalp no active bleeding or laceration appreciated,  C collar in place   Eyes:               PERRL; EOMi with anicteric sclera and clear conjunctivae  Mouth:             Oropharynx clear and without exudate; moist mucous membranes  Cardio:             Regular rate, occasional dropped beats, no murmur appreciated  Resp:               CTAB; respirations unlabored; no wheezes, crackles or rhonchi  Abdom:            Soft, NTND with normoactive bowel sounds  Extrem:            Warm and well-perfused with no clubbing, cyanosis or edema  Skin:                No rashes, lesions, or color changes  Pulses:            2+ and symmetric distally  Neuro:             AAOx3; cooperative and pleasant with no focal deficits    Laboratory:  Laboratory Data Reviewed: yes  Pertinent Findings:  Trop I 0.008 -> 0.021 -> 0.060    WBC 20s, at baseline, stable    Cr 1.1, baseline, GFR 48    Microbiology Data Reviewed: yes  Pertinent Findings:  pending    Other Results:  EKG (my interpretation): no interval " studies  Tele: sinus bradycardia, intermittent dropped beats/pauses    Radiology Data Reviewed: yes  Pertinent Findings:  MRI c spine: No edema type signal in the cervical spine to suggest an acute fracture or ligamentous injury.    Endplate abnormality of the C7 vertebral body identified on the CT, most likely represent a chronic fracture.    Multilevel degenerative changes of the cervical spine with moderate narrowing the spinal canal at the C4-C5 level.    Current Medications:     Infusions:       Scheduled:   aspirin  81 mg Oral Daily    losartan  100 mg Oral Daily    And    hydroCHLOROthiazide  12.5 mg Oral Daily    pantoprazole  40 mg Oral Daily    rosuvastatin  40 mg Oral QHS        PRN:  acetaminophen, atropine, melatonin, sodium chloride 0.9%    Antibiotics and Day Number of Therapy:  N/A    Lines and Day Number of Therapy:  PIV    Assessment:     Za Limon is a 79 y.o.female with  Patient Active Problem List    Diagnosis Date Noted    Syncope 02/21/2020    Dyspnea on exertion 12/19/2019    Nonspecific abnormal electrocardiogram (ECG) (EKG) 12/19/2019    PSVT (paroxysmal supraventricular tachycardia) 12/19/2019    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 07/29/2019    Major depression, recurrent, chronic 01/28/2019    Gastroesophageal reflux disease without esophagitis 01/28/2019    History of PSVT (paroxysmal supraventricular tachycardia) 08/08/2017    Orthostatic hypotension 01/24/2017    Premature atrial contractions 01/24/2017    Second degree AV block 05/06/2016    Syncope and collapse 04/05/2016    Essential hypertension 04/05/2016    Hyperlipidemia 04/05/2016    CLL (chronic lymphocytic leukemia) 04/05/2016    First degree AV block 04/05/2016    Left anterior hemiblock 04/05/2016    Other chest pain 04/05/2016        Plan:     Syncope likely cardiogenic cause  - EKG with appearance of Mobitz type I with progressively lengthening TX interval, although difficult  to discern any p wave before dropped beat  - given known arrhythmia history and past syncopal events, likely this is cause  - O cardiology consulted from ED, appreciate recs: admit to ICU to monitor closely for arrhythmia, they will follow  - have ordered atropine prn, pacer pads placed, currently asymptomatic but will monitor closely  - initial trop negative however trended up to above reference range, will trend to peak/down. No Sx of chest pain so low suspicion for ACS      C7 spinal fracture, undetermined chronicity  - neck/head pain after syncope today   - CT c spine with acute nondisplaced fx of anterior vertebral body C7  - ED discussed with NSGY, MRI obtained  - MRI with fx possibly chronic, will discuss with NSGY today to ensure proper follow up plan  - continue c collar and c spine precautions      CLL with Leukocytosis  - chronic elevated WBC per pt  - per chart review at baseline  - cont outpatient follow up      HTN  - cont home losartan/HCTZ      CKD II  - GFR and Cr at baseline per chart review  - Continue ARB, monitor      HLD  - cont home statin      GERD  - cont PPI      Osteoporosis  - on home alendronate      Mood Disorder  - will confirm if on lexapro       Diet: NPO in case of procedure w cards  Ppx: SCDs, transition to SQH if no procedure today  Dispo: pending cardiology recommendations    Mary Paul  [facility] HO-III  [facility] IM Service Team B    [facility] Medicine Hospitalist Pager numbers:   [facility] Hospitalist Medicine Team A (Parker/Ngozi): [phone]  [facility] Hospitalist Medicine Team B (Ant/Mray):  [phone]

## 2020-02-22 NOTE — ED NOTES
Pt brought to ICU with ED tech and nurse via ED bed.  Pt attached to cardiac monitor, BP cuff and pulse ox.  Defibrillator pads applied to pt and attached to [facility] for transport.

## 2020-02-22 NOTE — NURSING
Pt admitted to room 264 with DX syncope. Placed on continuous monitoring. Vs with elevated b/p at present. Cervical collar is on. Admit team to bedside to assess pt and also pt's daughters are at the bedside for now. Pt is AAOx4. C/o mild headache. MD will address. Fall Prevention in progress. Bed alarm on.

## 2020-02-22 NOTE — CONSULTS
"[facility]  Cardiology  Consult Note    Patient Name: Za Limon  MRN: [id number]  Admission Date: 2/21/2020  Hospital Length of Stay: 1 days  Code Status: Full Code   Attending Provider: Joshua Canales MD   Consulting Provider: Clare Colon MD  Primary Care Physician: Mina Solorzano MD  Principal Problem:Syncope    Patient information was obtained from patient, past medical records and ER records.     Consults  Subjective:     Chief Complaint:  Syncope     HPI:   80 yo female h/o HTN, HLD, CLL    Admitted for LOC. Reports was at [facility], when felt light-headed. Leaned her head down. Next thing she remembers is waking up on the floor. She has a C7 spinal fracture for which she is wearing a cervical collar. Of note, the episode happened when she stood up straight after reaching something on a lower shelf.    Ms Limon states the last episode of syncope was about a year ago, when she had 4 consecutive episodes. She had falls from this, but was at home, and had not sustained any significant trauma.    Reported h/o 2nd degree AV block in cardiology notes May 2016. I do not see an ECG or Holter report from that time to verify this. The same note, repots "ECG-NSR with first degree AV block, LAHB". On subsequent follow-up recommended to monitor off metoprolol, and to cut back on HCTZ given concerns for orthostatic hypotension. Her symptoms resolved with these changes.    Does report some chest tightness she reports with "bronchitis". She does treadmill for about 20-30 mins everyday, and floor exercises for about 15-20 mins everyday. Denies any exertional symptoms. She has not been able to do her exercises for the last 2-3 weeks d/t her bronchitis symptoms. She reports occasional palpitations and irregular heart rhythms. Denies orthopnea, PND. Does have LLE swelling (reports chronic).      Past Medical History:   Diagnosis Date    2nd degree AV block 5/6/2016    CLL (chronic lymphocytic leukemia)  "    Hyperlipidemia     Hypertension        Past Surgical History:   Procedure Laterality Date    HYSTERECTOMY      left leg laser surgery         Review of patient's allergies indicates:   Allergen Reactions    Codeine        No current facility-administered medications on file prior to encounter.      Current Outpatient Medications on File Prior to Encounter   Medication Sig    acetaminophen (TYLENOL) 500 MG tablet Take 500 mg by mouth every 6 (six) hours as needed for Pain.    alendronate (FOSAMAX) 70 MG tablet TAKE 1 TABLET BY MOUTH ONE TIME PER WEEK    aspirin (ECOTRIN) 81 MG EC tablet Take 81 mg by mouth once daily.    diphenhydrAMINE (BENADRYL) 25 mg capsule Take 25 mg by mouth nightly as needed for Itching.    escitalopram oxalate (LEXAPRO) 10 MG tablet Take 1 tablet (10 mg total) by mouth once daily.    guaifenesin/dextromethorphan (ROBITUSSIN-DM ORAL) Take by mouth every evening.    losartan-hydrochlorothiazide 100-12.5 mg (HYZAAR) 100-12.5 mg Tab Take 1 tablet by mouth once daily. One half tablet daily    omeprazole (PRILOSEC) 20 MG capsule TAKE 1 CAPSULE BY MOUTH EVERY DAY    ranitidine (ZANTAC) 150 MG tablet Take 1 tablet (150 mg total) by mouth 2 (two) times daily.    rosuvastatin (CRESTOR) 40 MG Tab TAKE 1 TABLET BY MOUTH EVERY DAY IN THE EVENING     Family History     Problem Relation (Age of Onset)    Heart disease Sister, Brother    Stroke Father        Tobacco Use    Smoking status: Never Smoker    Smokeless tobacco: Never Used   Substance and Sexual Activity    Alcohol use: Not on file    Drug use: Not on file    Sexual activity: Not Currently     Review of Systems   Constitution: Positive for chills and night sweats. Negative for fever.   HENT: Negative for nosebleeds.    Cardiovascular:        As above   Respiratory: Negative for hemoptysis.    Hematologic/Lymphatic: Negative for bleeding problem. Does not bruise/bleed easily.   Gastrointestinal: Negative for hematochezia and  melena.   Genitourinary: Negative for hematuria.   Neurological: Negative for seizures.     Objective:     Vital Signs (Most Recent):  Temp: 98.2 °F (36.8 °C) (02/22/20 0710)  Pulse: 66 (02/22/20 0900)  Resp: (!) 22 (02/22/20 0900)  BP: 139/67 (02/22/20 0900)  SpO2: 99 % (02/22/20 0900) Vital Signs (24h Range):  Temp:  [97.6 °F (36.4 °C)-98.6 °F (37 °C)] 98.2 °F (36.8 °C)  Pulse:  [49-68] 66  Resp:  [9-36] 22  SpO2:  [87 %-100 %] 99 %  BP: (133-203)/() 139/67     Weight: 74.7 kg (164 lb 10.9 oz)  Body mass index is 26.58 kg/m².    SpO2: 99 %  O2 Device (Oxygen Therapy): room air      Intake/Output Summary (Last 24 hours) at 2/22/2020 1133  Last data filed at 2/22/2020 0800  Gross per 24 hour   Intake 180 ml   Output 250 ml   Net -70 ml       Lines/Drains/Airways     Peripheral Intravenous Line                 Peripheral IV - Single Lumen 02/21/20 2116 20 G Left Antecubital less than 1 day                Physical Exam   Constitutional: She appears well-developed.   Cervical collar   Cardiovascular: Normal rate and regular rhythm. Exam reveals no gallop and no friction rub.   No murmur heard.  Pulmonary/Chest: Effort normal and breath sounds normal. No respiratory distress.   Abdominal: Soft. Bowel sounds are normal. There is no tenderness.   Musculoskeletal: She exhibits edema (LLE (chronic)).   Neurological: She is alert.   Skin: Skin is warm. She is not diaphoretic.   Psychiatric: She has a normal mood and affect.       Significant Labs:   CMP   Recent Labs   Lab 02/21/20  1633 02/22/20  0407    138   K 4.3 4.1    106   CO2 19* 20*    89   BUN 20 18   CREATININE 1.4 1.1   CALCIUM 10.0 9.5   PROT  --  6.8   ALBUMIN  --  4.2   BILITOT  --  0.8   ALKPHOS  --  53*   AST  --  25   ALT  --  17   ANIONGAP 14 12   ESTGFRAFRICA 41* 55*   EGFRNONAA 36* 48*   , CBC   Recent Labs   Lab 02/21/20  1633 02/22/20  0407   WBC 20.50* 21.28*   HGB 12.1 11.8*   HCT 37.8 37.5    156    and Troponin  "  Recent Labs   Lab 02/21/20  2216 02/22/20  0408 02/22/20  1023   TROPONINI 0.021 0.060* 0.084*       Assessment and Plan:     80 yo female h/o HTN, HLD, CLL    Reported h/o 2nd degree AV block in cardiology notes May 2016. I do not see an ECG or Holter report from that time to verify this. The same note, repots "ECG-NSR with first degree AV block, LAHB". On subsequent follow-up recommended to monitor off metoprolol, and to cut back on HCTZ given concerns for orthostatic hypotension. Her symptoms resolved with these changes.    Reviewed ECGs this admission and telemetry on the floor reviewed.    Noted to have sinus rhythm with some sinus arrhythmia, and blocked PACs. First degree AVB present  ECG done 2/22/20 at 9.45 am, concerning for wandering atrial pacemaker  I do not see evidence of Wenckebach or other higher grade AV block  The longest pause after a blocked PAC is 1536 ms (coresponding to a HR 38 bpm, for that interval). A similar episode may have contributed to her syncopal event.    Per patient's description of the events leading to the fall, concern for orthostatic hypotension. Recommend discontinue hydrochlorothiazide.     K WNL. Clinical scenario does not support MI, ADHF, or underlying infectious etiology. Continue to watch on telemetry at this time. Echocardiogram. Not on AV chet blockers.      Active Diagnoses:    Diagnosis Date Noted POA    PRINCIPAL PROBLEM:  Syncope [R55] 02/21/2020 Yes    Gastroesophageal reflux disease without esophagitis [K21.9] 01/28/2019 Yes    Second degree AV block [I44.1] 05/06/2016 Yes    Essential hypertension [I10] 04/05/2016 Yes    Hyperlipidemia [E78.5] 04/05/2016 Yes    CLL (chronic lymphocytic leukemia) [C91.10] 04/05/2016 Yes    Syncope and collapse [R55] 04/05/2016 Yes      Problems Resolved During this Admission:       VTE Risk Mitigation (From admission, onward)         Ordered     IP VTE HIGH RISK PATIENT  Once      02/21/20 2158     Place sequential " compression device  Until discontinued      02/21/20 7499                Thank you for your consult. I will follow-up with patient. Please contact us if you have any additional questions.    Clare Colon MD  Cardiology   [facility]

## 2020-02-22 NOTE — NURSING
Upon performing orthostatic pressures; pt was noted to become dizzy and lightheaded. HR increased to 130s and patient stated she was feeling very dizzy and needed to sit down. BP dropped to 70s; see flowsheet. Patient assisted back into bed safely and states she feels better since being placed back in semi fowlers position. NAD noted at this time, will continue to monitor.

## 2020-02-22 NOTE — NURSING
Pt no longer hypertensive MD notified and ok to hold b/p med for now. Order for mild sleep aide and tylenol. Care continues.

## 2020-02-22 NOTE — H&P
[facility] Internal Medicine History and Physical - Resident Note    Admitting Team: [facility] Internal Medicine Team B  Attending Physician:   Resident: Humberto  Interns: Felix    Date of Admit: 2/21/2020    Chief Complaint     Loss of Consciousness (pt BIB EMS with reports of syncope at [facility]; pt c/o dizziness and fatigue, denies any cp, sob) and Headache   for 1 day.    Subjective:      History of Present Illness:  Za Limon is a 79 y.o. 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 The patient presented to [facility] on 2/21/2020 with a primary complaint of Loss of Consciousness (pt BIB EMS with reports of syncope at [facility]; pt c/o dizziness and fatigue, denies any cp, sob) and Headache    Patient says she was shopping in the grocery store and bent down to pick out something low down on the shelf. When she stood back up, she felt very dizzy and was holding on to her cart. She thinks she must have fallen backwards, but does not remember, next thing she remembers is being approached by a young lady asking if she was ok. She denies any other pain or injury except headache and neck pain related to her c collar.    Of note, this has happened to her several times before and she has been counseled about a pacemaker with her outpatient care team (PCP, maria m) given her known 2nd degree heart block. She has resisted pursuing pacemaker up until now. She denies any blood thinners except baby ASA.    She has workup in ED sig for CT c spine with C7 body fx. No ICH. She also endorses some chest congestion and sinus congestion for past few weeks, has been taking OTC meds for this but cannot remember what. Notes she has night sweats most nights, attributes to CLL.     Past Medical History:  Past Medical History:   Diagnosis Date    2nd degree AV block 5/6/2016    CLL (chronic lymphocytic leukemia)      Hyperlipidemia     Hypertension        Past Surgical History:  Past Surgical History:   Procedure Laterality Date    HYSTERECTOMY      left leg laser surgery         Allergies:  Review of patient's allergies indicates:   Allergen Reactions    Codeine        Home Medications:  Prior to Admission medications    Medication Sig Start Date End Date Taking? Authorizing Provider   acetaminophen (TYLENOL) 500 MG tablet Take 500 mg by mouth every 6 (six) hours as needed for Pain.    Historical Provider, MD   alendronate (FOSAMAX) 70 MG tablet TAKE 1 TABLET BY MOUTH ONE TIME PER WEEK 1/14/20   Mina Solorzano MD   aspirin (ECOTRIN) 81 MG EC tablet Take 81 mg by mouth once daily.    Historical Provider, MD   diphenhydrAMINE (BENADRYL) 25 mg capsule Take 25 mg by mouth nightly as needed for Itching.    Historical Provider, MD   escitalopram oxalate (LEXAPRO) 10 MG tablet Take 1 tablet (10 mg total) by mouth once daily. 1/28/19   Mina Solorzano MD   guaifenesin/dextromethorphan (ROBITUSSIN-DM ORAL) Take by mouth every evening.    Historical Provider, MD   losartan-hydrochlorothiazide 100-12.5 mg (HYZAAR) 100-12.5 mg Tab Take 1 tablet by mouth once daily. One half tablet daily 1/28/19   Mina Solorzano MD   omeprazole (PRILOSEC) 20 MG capsule TAKE 1 CAPSULE BY MOUTH EVERY DAY 1/14/20   Mina Solorzano MD   ranitidine (ZANTAC) 150 MG tablet Take 1 tablet (150 mg total) by mouth 2 (two) times daily. 5/2/19 5/1/20  Stepan Blanco MD   rosuvastatin (CRESTOR) 40 MG Tab TAKE 1 TABLET BY MOUTH EVERY DAY IN THE EVENING 2/17/20   Mina Solorzano MD       Family History:  Family History   Problem Relation Age of Onset    Stroke Father     Heart disease Sister     Heart disease Brother        Social History:  Social History     Tobacco Use    Smoking status: Never Smoker    Smokeless tobacco: Never Used   Substance Use Topics    Alcohol use: Not on file    Drug use: Not on file       Review of Systems:  Pertinent positives and  "negatives listed in HPI. All other systems are reviewed and are negative.    Health Maintaince :   Primary Care Physician: Dr. Sloorzano  Immunizations:   TDap is up to date.  Influenza is up to date.  Pneumovax is up to date.  Cancer Screening:  PAP: unknown   Mammogram: is up to date.   Colonoscopy: unknown     Objective:   Last 24 Hour Vital Signs:  BP  Min: 158/73  Max: 183/101  Temp  Av °F (36.7 °C)  Min: 97.6 °F (36.4 °C)  Max: 98.3 °F (36.8 °C)  Pulse  Av.7  Min: 55  Max: 66  Resp  Av.2  Min: 17  Max: 29  SpO2  Av.3 %  Min: 98 %  Max: 100 %  Height  Av' 6" (167.6 cm)  Min: 5' 6" (167.6 cm)  Max: 5' 6" (167.6 cm)  Weight  Av.3 kg (170 lb 5.5 oz)  Min: 74.7 kg (164 lb 10.9 oz)  Max: 79.8 kg (176 lb)  Body mass index is 26.58 kg/m².  No intake/output data recorded.    Physical Examination:  General: Alert and awake in no apparent distress  Head:  Normocephalic, some dried blood to hair posterior scalp no active bleeding or laceration appreciated,  C collar in place   Eyes:  PERRL; EOMi with anicteric sclera and clear conjunctivae  Mouth:  Oropharynx clear and without exudate; moist mucous membranes  Cardio:  Regular rate, occasional dropped beats, no murmur appreciated  Resp:  CTAB; respirations unlabored; no wheezes, crackles or rhonchi  Abdom: Soft, NTND with normoactive bowel sounds  Extrem: Warm and well-perfused with no clubbing, cyanosis or edema  Skin:  No rashes, lesions, or color changes  Pulses: 2+ and symmetric distally  Neuro:  AAOx3; cooperative and pleasant with no focal deficits    Laboratory:  Most Recent Data:  CBC:   Lab Results   Component Value Date    WBC 20.50 (H) 2020    HGB 12.1 2020    HCT 37.8 2020     2020    MCV 97 2020    RDW 13.2 2020     BMP:   Lab Results   Component Value Date     2020    K 4.3 2020     2020    CO2 19 (L) 2020    BUN 20 2020    CREATININE 1.4 2020    "  02/21/2020    CALCIUM 10.0 02/21/2020    MG 2.3 02/21/2020    PHOS 3.4 02/21/2020     Estimated Creatinine Clearance: 33.1 mL/min (based on SCr of 1.4 mg/dL).    Coags: No results found for: INR, PROTIME, PTT  FLP:   Lab Results   Component Value Date    CHOL 134 02/04/2019    HDL 43 02/04/2019    LDLCALC 75.2 02/04/2019    TRIG 79 02/04/2019    CHOLHDL 32.1 02/04/2019     DM:   Lab Results   Component Value Date    HGBA1C 5.6 02/04/2019    LDLCALC 75.2 02/04/2019    CREATININE 1.4 02/21/2020     Thyroid: No results found for: TSH, FREET4, X1ACIFN, A5XREMR, THYROIDAB  Anemia: No results found for: IRON, TIBC, FERRITIN, TSGTRNPW47, FOLATE  Cardiac:   Lab Results   Component Value Date    TROPONINI 0.021 02/21/2020     Urinalysis:   Lab Results   Component Value Date    COLORU Yellow 02/21/2020    SPECGRAV 1.015 02/21/2020    NITRITE Negative 02/21/2020    KETONESU Negative 02/21/2020    UROBILINOGEN Negative 02/21/2020    WBCUA 1 02/04/2019       Trended Cardiac Data:  Recent Labs   Lab 02/21/20  1633 02/21/20  2216   TROPONINI 0.008 0.021       Microbiology Data:  N/A    Other Laboratory Data:  N/A    Other Results:  EKG (my interpretation): 2nd degree AV block (mobitz type I, wenckebach), no DANIELLE or STD    Radiology:  Imaging Results          MRI Cervical Spine Without Contrast (Final result)  Result time 02/21/20 21:07:14    Final result by Evan Abernathy MD (02/21/20 21:07:14)                 Impression:      No edema type signal in the cervical spine to suggest an acute fracture or ligamentous injury.    Endplate abnormality of the C7 vertebral body identified on the CT, most likely represent a chronic fracture.    Multilevel degenerative changes of the cervical spine with moderate narrowing the spinal canal at the C4-C5 level.      Electronically signed by: Evan Abernathy MD  Date:    02/21/2020  Time:    21:07             Narrative:    EXAMINATION:  MRI CERVICAL SPINE WITHOUT CONTRAST    CLINICAL  HISTORY:  C-spine trauma, NEXUS/CCR positive, arterial injury suspected;known fx at C7;.    TECHNIQUE:  Multiplanar, multisequence MR images of the cervical spine were acquired without the administration of contrast.    COMPARISON:  CT scan of the cervical spine dated 02/21/2020.    FINDINGS:  The visualized portions of the posterior fossa is unremarkable.  There is arthropathy at the craniocervical junction.  The prevertebral soft tissues are within normal limits.  The ligaments are unremarkable.    The cervical alignment is within normal limits.  The bone marrow signal is unremarkable.  No edema type signal identified at the level of the C7 superior endplate or evidence of a fracture line.    The cord is normal in signal without evidence of edema or expansion.  There is indentation of the ventral thecal sac at the C4-C5 level.    There is multilevel disc desiccation.  Evaluation of the individual disc levels reveals the following:    C2-C3, there is a disc osteophyte complex along with facet hypertrophy and uncovertebral hypertrophy.  The spinal canal and neural foramina are unremarkable.    C3-C4, there is a disc osteophyte complex along with facet hypertrophy and uncovertebral hypertrophy.  There is superimposed central disc protrusion.  There is mild narrowing the spinal canal.  There is mild bilateral neural foraminal narrowing.    C4-C5, there is a disc osteophyte complex along with facet hypertrophy and uncovertebral hypertrophy.  There is superimposed central disc protrusion.  There is moderate narrowing of the spinal canal.  There is mild bilateral neural foraminal narrowing.    C5-C6, there is a disc osteophyte complex along with facet hypertrophy and uncovertebral hypertrophy.  There is superimposed central disc protrusion.  There is mild narrowing of the spinal canal.  There is mild bilateral neural foraminal narrowing.    C6-C7, there is a disc osteophyte complex along with facet hypertrophy and  uncovertebral hypertrophy.  There is superimposed central disc protrusion.  There is mild narrowing of the spinal canal.  There is mild bilateral neural foraminal narrowing.    C7-T1, normal.    There are perineural nerve sleeve cyst on the right side at the level C5-C6 and C6-C7 levels.  No evidence is a meningocele suggested nerve root avulsion.    Flow voids within the vessels of the neck appear grossly unremarkable.  The soft tissue the neck are unremarkable.                               X-Ray Chest AP Portable (Final result)  Result time 02/21/20 17:24:44    Final result by Leroy Hunter MD (02/21/20 17:24:44)                 Impression:      1. Grossly stable chronic findings, no large focal consolidation.      Electronically signed by: Leroy Hunter MD  Date:    02/21/2020  Time:    17:24             Narrative:    EXAMINATION:  XR CHEST AP PORTABLE    CLINICAL HISTORY:  syncope;    TECHNIQUE:  Single frontal view of the chest was performed.    COMPARISON:  01/31/2012    FINDINGS:  The cardiomediastinal silhouette is not enlarged noting calcification of the aorta.  There is elevation of the right hemidiaphragm..  There is no pleural effusion.  The trachea is midline.  The lungs are symmetrically expanded bilaterally with coarse interstitial attenuation and left basilar subsegmental atelectasis..  No large focal consolidation seen.  There is no pneumothorax.  The osseous structures are remarkable for degenerative change..                               CT Cervical Spine Without Contrast (Final result)  Result time 02/21/20 17:11:56    Final result by Evan Abernathy MD (02/21/20 17:11:56)                 Impression:      Acute nondisplaced fracture involving an anterior vertebral body osteophyte at the level of C7.    No evidence of listhesis.      Electronically signed by: Evan Abernathy MD  Date:    02/21/2020  Time:    17:11             Narrative:    EXAMINATION:  CT CERVICAL SPINE WITHOUT  CONTRAST    CLINICAL HISTORY:  C-spine trauma, NEXUS/CCR positive, +risk factor(s);    TECHNIQUE:  Low dose axial images, sagittal and coronal reformations were performed though the cervical spine.  Contrast was not administered.    COMPARISON:  None    FINDINGS:  The visualized portions of the posterior fossa is unremarkable.  The predental space is maintained.  No prevertebral soft tissue swelling is identified.    There is straightening of the normal cervical lordosis.  The vertebral body heights are maintained.  There is a fracture involving anterior osteophyte at the level of C7.  This involves the anterior superior endplate.  No significant displacement is identified.  There is hypertrophy of the posterior elements.  The C1 ring is intact.  There is intervertebral disc space narrowing at multiple levels.  There is variable spinal canal and neural foraminal narrowing.    The soft tissue the neck are within normal limits.  There are scattered calcifications involving the carotid vessels.  There is no evidence of lymphadenopathy in the neck.  The visualized lung apices are unremarkable.                               CT Head Without Contrast (Final result)  Result time 02/21/20 17:06:10    Final result by Evan Abernathy MD (02/21/20 17:06:10)                 Impression:      No acute intracranial process.      Electronically signed by: Evan Abernathy MD  Date:    02/21/2020  Time:    17:06             Narrative:    EXAMINATION:  CT HEAD WITHOUT CONTRAST    CLINICAL HISTORY:  Head trauma, headache;    TECHNIQUE:  Low dose axial images were obtained through the head.  Coronal and sagittal reformations were also performed. Contrast was not administered.    COMPARISON:  None.    FINDINGS:  The subcutaneous tissues are unremarkable.  There is hyperostosis.  The paranasal sinuses are unremarkable.  The mastoid air cells are clear.  The orbits and intraorbital contents are within normal limits.    The craniocervical junction  is unremarkable.  The midline structures are unremarkable.  There are no extra-axial fluid collections.  There is no evidence of intracranial hemorrhage.  The ventricles and sulci are prominent, suggestive of cerebral volume loss.  There are scattered hypodense foci within the periventricular and subcortical white matter.  The gray-white differentiation is maintained.  There is no evidence of mass effect.                                   Assessment:     Za Limon is a 79 y.o. female with:  Patient Active Problem List    Diagnosis Date Noted    Syncope 02/21/2020    Dyspnea on exertion 12/19/2019    Nonspecific abnormal electrocardiogram (ECG) (EKG) 12/19/2019    PSVT (paroxysmal supraventricular tachycardia) 12/19/2019    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 07/29/2019    Major depression, recurrent, chronic 01/28/2019    Gastroesophageal reflux disease without esophagitis 01/28/2019    History of PSVT (paroxysmal supraventricular tachycardia) 08/08/2017    Orthostatic hypotension 01/24/2017    Premature atrial contractions 01/24/2017    Syncope and collapse 04/05/2016    Essential hypertension 04/05/2016    Hyperlipidemia 04/05/2016    CLL (chronic lymphocytic leukemia) 04/05/2016    First degree AV block 04/05/2016    Left anterior hemiblock 04/05/2016    Other chest pain 04/05/2016        Plan:     Syncope likely cardiogenic cause  - EKG with appearance of Mobitz type I with progressively lengthening ID interval, although difficult to discern any p wave before dropped beat  - given known arrhythmia history and past syncopal events, likely this is cause  - O cardiology consulted from ED, appreciate recs: admit to ICU to monitor closely for arrhythmia, they will follow  - have ordered atropine prn, pacer pads placed, currently asymptomatic but will monitor closely  - initial trop negative, will trend to r/o ACS, continue ASA     C7 spinal fracture, undetermined chronicity  -  neck/head pain after syncope today   - CT c spine with acute nondisplaced fx of anterior vertebral body C7  - ED discussed with NSGY, MRI obtained  - follow MRI results, cont c collar/c spine precautions     CLL with Leukocytosis  - chronic elevated WBC per pt  - per chart review at baseline  - cont outpatient follow up     HTN  - cont home losartan/HCTZ     CKD II  - GFR and Cr at baseline per chart review  - Continue ARB, monitor     HLD  - cont home statin     GERD  - cont PPI     Osteoporosis  - on home alendronate     Mood Disorder  - will confirm if on lexapro     Code Status:     Code: Full  Diet: NPO pending ?intervention/cards eval  Ppx: SCDs, will initiate SQH if no procedure in AM    Mary Paul  [facility] HO-III  [facility] Service    [facility] Hospitalist Pager numbers:   [facility] Hospital[facility] Medicine Team A (Parker/Ngozi): [phone]  [facility] Hospitalist Medicine Team B (Ant/Mary):  [phone]

## 2020-02-22 NOTE — NURSING
Notified Dr. Melvin via telephone of Troponin results 0.084. No new orders received at this time.

## 2020-02-23 LAB
ALBUMIN SERPL BCP-MCNC: 3.6 G/DL (ref 3.5–5.2)
ALP SERPL-CCNC: 47 U/L (ref 55–135)
ALT SERPL W/O P-5'-P-CCNC: 11 U/L (ref 10–44)
ANION GAP SERPL CALC-SCNC: 10 MMOL/L (ref 8–16)
AST SERPL-CCNC: 19 U/L (ref 10–40)
BASOPHILS # BLD AUTO: ABNORMAL K/UL (ref 0–0.2)
BASOPHILS NFR BLD: 0 % (ref 0–1.9)
BILIRUB SERPL-MCNC: 0.8 MG/DL (ref 0.1–1)
BUN SERPL-MCNC: 20 MG/DL (ref 8–23)
CALCIUM SERPL-MCNC: 9 MG/DL (ref 8.7–10.5)
CHLORIDE SERPL-SCNC: 107 MMOL/L (ref 95–110)
CO2 SERPL-SCNC: 22 MMOL/L (ref 23–29)
CREAT SERPL-MCNC: 1.2 MG/DL (ref 0.5–1.4)
DIFFERENTIAL METHOD: ABNORMAL
EOSINOPHIL # BLD AUTO: ABNORMAL K/UL (ref 0–0.5)
EOSINOPHIL NFR BLD: 0 % (ref 0–8)
ERYTHROCYTE [DISTWIDTH] IN BLOOD BY AUTOMATED COUNT: 13.3 % (ref 11.5–14.5)
EST. GFR  (AFRICAN AMERICAN): 49 ML/MIN/1.73 M^2
EST. GFR  (NON AFRICAN AMERICAN): 43 ML/MIN/1.73 M^2
GLUCOSE SERPL-MCNC: 93 MG/DL (ref 70–110)
HCT VFR BLD AUTO: 31.5 % (ref 37–48.5)
HGB BLD-MCNC: 10.1 G/DL (ref 12–16)
IMM GRANULOCYTES # BLD AUTO: ABNORMAL K/UL (ref 0–0.04)
IMM GRANULOCYTES NFR BLD AUTO: ABNORMAL % (ref 0–0.5)
LYMPHOCYTES # BLD AUTO: ABNORMAL K/UL (ref 1–4.8)
LYMPHOCYTES NFR BLD: 78 % (ref 18–48)
MCH RBC QN AUTO: 31.1 PG (ref 27–31)
MCHC RBC AUTO-ENTMCNC: 32.1 G/DL (ref 32–36)
MCV RBC AUTO: 97 FL (ref 82–98)
MONOCYTES # BLD AUTO: ABNORMAL K/UL (ref 0.3–1)
MONOCYTES NFR BLD: 4 % (ref 4–15)
NEUTROPHILS NFR BLD: 18 % (ref 38–73)
NRBC BLD-RTO: 0 /100 WBC
PLATELET # BLD AUTO: 161 K/UL (ref 150–350)
PLATELET BLD QL SMEAR: ABNORMAL
PMV BLD AUTO: 12 FL (ref 9.2–12.9)
POTASSIUM SERPL-SCNC: 3.7 MMOL/L (ref 3.5–5.1)
PROT SERPL-MCNC: 6.2 G/DL (ref 6–8.4)
RBC # BLD AUTO: 3.25 M/UL (ref 4–5.4)
SODIUM SERPL-SCNC: 139 MMOL/L (ref 136–145)
TROPONIN I SERPL DL<=0.01 NG/ML-MCNC: 0.13 NG/ML (ref 0–0.03)
TROPONIN I SERPL DL<=0.01 NG/ML-MCNC: 0.15 NG/ML (ref 0–0.03)
TROPONIN I SERPL DL<=0.01 NG/ML-MCNC: 0.16 NG/ML (ref 0–0.03)
WBC # BLD AUTO: 21.19 K/UL (ref 3.9–12.7)

## 2020-02-23 PROCEDURE — 36415 COLL VENOUS BLD VENIPUNCTURE: CPT

## 2020-02-23 PROCEDURE — 80053 COMPREHEN METABOLIC PANEL: CPT

## 2020-02-23 PROCEDURE — 84484 ASSAY OF TROPONIN QUANT: CPT

## 2020-02-23 PROCEDURE — 99233 SBSQ HOSP IP/OBS HIGH 50: CPT | Mod: ,,, | Performed by: INTERNAL MEDICINE

## 2020-02-23 PROCEDURE — 25000003 PHARM REV CODE 250: Performed by: INTERNAL MEDICINE

## 2020-02-23 PROCEDURE — 20000000 HC ICU ROOM

## 2020-02-23 PROCEDURE — 84484 ASSAY OF TROPONIN QUANT: CPT | Mod: 91

## 2020-02-23 PROCEDURE — 85007 BL SMEAR W/DIFF WBC COUNT: CPT

## 2020-02-23 PROCEDURE — 25000003 PHARM REV CODE 250: Performed by: STUDENT IN AN ORGANIZED HEALTH CARE EDUCATION/TRAINING PROGRAM

## 2020-02-23 PROCEDURE — 63600175 PHARM REV CODE 636 W HCPCS: Performed by: STUDENT IN AN ORGANIZED HEALTH CARE EDUCATION/TRAINING PROGRAM

## 2020-02-23 PROCEDURE — 85027 COMPLETE CBC AUTOMATED: CPT

## 2020-02-23 PROCEDURE — 99233 PR SUBSEQUENT HOSPITAL CARE,LEVL III: ICD-10-PCS | Mod: ,,, | Performed by: INTERNAL MEDICINE

## 2020-02-23 PROCEDURE — 94761 N-INVAS EAR/PLS OXIMETRY MLT: CPT

## 2020-02-23 RX ORDER — SODIUM CHLORIDE, SODIUM LACTATE, POTASSIUM CHLORIDE, CALCIUM CHLORIDE 600; 310; 30; 20 MG/100ML; MG/100ML; MG/100ML; MG/100ML
INJECTION, SOLUTION INTRAVENOUS CONTINUOUS
Status: ACTIVE | OUTPATIENT
Start: 2020-02-23 | End: 2020-02-23

## 2020-02-23 RX ORDER — SODIUM CHLORIDE, SODIUM LACTATE, POTASSIUM CHLORIDE, CALCIUM CHLORIDE 600; 310; 30; 20 MG/100ML; MG/100ML; MG/100ML; MG/100ML
INJECTION, SOLUTION INTRAVENOUS CONTINUOUS
Status: ACTIVE | OUTPATIENT
Start: 2020-02-23 | End: 2020-02-24

## 2020-02-23 RX ADMIN — MECLIZINE HYDROCHLORIDE 25 MG: 25 TABLET ORAL at 09:02

## 2020-02-23 RX ADMIN — LOSARTAN POTASSIUM 100 MG: 50 TABLET, FILM COATED ORAL at 08:02

## 2020-02-23 RX ADMIN — PANTOPRAZOLE SODIUM 40 MG: 40 TABLET, DELAYED RELEASE ORAL at 08:02

## 2020-02-23 RX ADMIN — MECLIZINE HYDROCHLORIDE 25 MG: 25 TABLET ORAL at 04:02

## 2020-02-23 RX ADMIN — ASPIRIN 81 MG: 81 TABLET, COATED ORAL at 08:02

## 2020-02-23 RX ADMIN — ROSUVASTATIN CALCIUM 40 MG: 10 TABLET, COATED ORAL at 08:02

## 2020-02-23 RX ADMIN — ACETAMINOPHEN 650 MG: 325 TABLET ORAL at 08:02

## 2020-02-23 RX ADMIN — MECLIZINE HYDROCHLORIDE 25 MG: 25 TABLET ORAL at 08:02

## 2020-02-23 RX ADMIN — SODIUM CHLORIDE, SODIUM LACTATE, POTASSIUM CHLORIDE, AND CALCIUM CHLORIDE: .6; .31; .03; .02 INJECTION, SOLUTION INTRAVENOUS at 10:02

## 2020-02-23 RX ADMIN — SODIUM CHLORIDE, SODIUM LACTATE, POTASSIUM CHLORIDE, AND CALCIUM CHLORIDE: .6; .31; .03; .02 INJECTION, SOLUTION INTRAVENOUS at 07:02

## 2020-02-23 RX ADMIN — Medication 3 MG: at 08:02

## 2020-02-23 NOTE — PROGRESS NOTES
[facility]  Cardiology  Progress Note    Patient Name: Za Limon  MRN: [id number]  Admission Date: 2/21/2020  Hospital Length of Stay: 2 days  Code Status: Full Code   Attending Physician: Joshua Canales MD   Primary Care Physician: Mina Solorzano MD  Expected Discharge Date:   Principal Problem:Syncope    Subjective:     Hospital Course:   2/23/20: Noted to be positive for orthostatic hypotension (With standing: HR 70s--> 130s; SBP 1102-->70s). HCTZ held. Started on IVF  cc/ hr (receieved for 12 hours). Reports feeling overall better today.    Review of Systems   Constitution: Positive for chills and night sweats. Negative for fever.   HENT: Negative for nosebleeds.    Cardiovascular: Positive for dyspnea on exertion (In the setting of a recent episode of bronchitis), irregular heartbeat, near-syncope, palpitations and syncope. Negative for orthopnea and paroxysmal nocturnal dyspnea. Chest pain: Non-exertional; random. Leg swelling: LLE chronic, mild.   Hematologic/Lymphatic: Negative for bleeding problem. Does not bruise/bleed easily.   Gastrointestinal: Negative for hematochezia and melena.   Genitourinary: Negative for hematuria.   Neurological: Negative for seizures.     Objective:     Vital Signs (Most Recent):  Temp: 98.1 °F (36.7 °C) (02/23/20 0710)  Pulse: 68 (02/23/20 0900)  Resp: (!) 21 (02/23/20 0900)  BP: (!) 127/58 (02/23/20 0900)  SpO2: 98 % (02/23/20 0900) Vital Signs (24h Range):  Temp:  [98 °F (36.7 °C)-98.5 °F (36.9 °C)] 98.1 °F (36.7 °C)  Pulse:  [] 68  Resp:  [18-36] 21  SpO2:  [80 %-100 %] 98 %  BP: ()/(38-92) 127/58     Weight: 74.4 kg (164 lb)  Body mass index is 26.47 kg/m².     SpO2: 98 %  O2 Device (Oxygen Therapy): room air      Intake/Output Summary (Last 24 hours) at 2/23/2020 0931  Last data filed at 2/23/2020 0856  Gross per 24 hour   Intake 2478.33 ml   Output 1600 ml   Net 878.33 ml       Lines/Drains/Airways     Drain            Female External  "Urinary Catheter 02/22/20 1415 less than 1 day          Peripheral Intravenous Line                 Peripheral IV - Single Lumen 02/21/20 2116 20 G Left Antecubital 1 day                Physical Exam   Constitutional: She appears well-developed. No distress.   Cardiovascular: Normal rate and regular rhythm. Exam reveals no gallop and no friction rub.   No murmur heard.  Pulmonary/Chest: Effort normal and breath sounds normal. No respiratory distress.   Abdominal: Soft. Bowel sounds are normal. There is no tenderness.   Musculoskeletal: She exhibits no edema.   Neurological: She is alert.   Skin: Skin is warm. She is not diaphoretic.   Psychiatric: She has a normal mood and affect.       Significant Labs:   CMP   Recent Labs   Lab 02/21/20  1633 02/22/20  0407 02/23/20  0405    138 139   K 4.3 4.1 3.7    106 107   CO2 19* 20* 22*    89 93   BUN 20 18 20   CREATININE 1.4 1.1 1.2   CALCIUM 10.0 9.5 9.0   PROT  --  6.8 6.2   ALBUMIN  --  4.2 3.6   BILITOT  --  0.8 0.8   ALKPHOS  --  53* 47*   AST  --  25 19   ALT  --  17 11   ANIONGAP 14 12 10   ESTGFRAFRICA 41* 55* 49*   EGFRNONAA 36* 48* 43*   , CBC   Recent Labs   Lab 02/21/20  1633 02/22/20  0407 02/23/20  0405   WBC 20.50* 21.28* 21.19*   HGB 12.1 11.8* 10.1*   HCT 37.8 37.5 31.5*    156 161   Troponin   Recent Labs   Lab 02/22/20  0408 02/22/20  1023 02/23/20  0405   TROPONINI 0.060* 0.084* 0.157*         Assessment and Plan:     78 yo female h/o HTN, HLD, CLL     Reported h/o 2nd degree AV block in cardiology notes May 2016. I do not see an ECG or Holter report from that time to verify this. The same note, repots "ECG-NSR with first degree AV block, LAHB". On subsequent follow-up recommended to monitor off metoprolol, and to cut back on HCTZ given concerns for orthostatic hypotension. Her symptoms resolved with these changes.     Reviewed ECGs this admission and telemetry on the floor reviewed.     Noted to have sinus rhythm with some " sinus arrhythmia, and blocked PACs. First degree AVB present  ECG done 2/22/20 at 9.45 am, concerning for wandering atrial pacemaker  I do not see evidence of Wenckebach or other higher grade AV block  The longest pause after a blocked PAC is 1536 ms (coresponding to a HR 38 bpm, for that interval). A similar episode (?longer pause) may have contributed to some of her prior syncopal event.     Per patient's description of the events leading to the fall, concern for orthostatic hypotension. Orthostatic BP significantly positive yesterday. HCTZ discontinued. Started on IVF LR (received about 1200 cc today).     K WNL. Clinical scenario does not support MI, ADHF, or underlying infectious etiology. Continue to watch on telemetry at this time. Echocardiogram. Not on AV chet blockers.    Please keep NPO after midnight tonight.      VTE Risk Mitigation (From admission, onward)         Ordered     IP VTE HIGH RISK PATIENT  Once      02/21/20 2158     Place sequential compression device  Until discontinued      02/21/20 2158                Clare Colon MD  Cardiology  [facility]

## 2020-02-23 NOTE — PLAN OF CARE
Pt is AAOx3. No complaints of nausea, vomiting, or diarrhea. Pt complained of pain to the back of her head and tylenol given. Cardiac diet. Sinus rhythm/sinus arrhythmia on the monitor. Safety precautions maintained. Tolerated all medications well.

## 2020-02-23 NOTE — SUBJECTIVE & OBJECTIVE
Review of Systems   Constitution: Positive for chills and night sweats. Negative for fever.   HENT: Negative for nosebleeds.    Cardiovascular: Positive for dyspnea on exertion (In the setting of a recent episode of bronchitis), irregular heartbeat, near-syncope, palpitations and syncope. Negative for orthopnea and paroxysmal nocturnal dyspnea. Chest pain: Non-exertional; random. Leg swelling: LLE chronic, mild.   Hematologic/Lymphatic: Negative for bleeding problem. Does not bruise/bleed easily.   Gastrointestinal: Negative for hematochezia and melena.   Genitourinary: Negative for hematuria.   Neurological: Negative for seizures.     Objective:     Vital Signs (Most Recent):  Temp: 98.1 °F (36.7 °C) (02/23/20 0710)  Pulse: 68 (02/23/20 0900)  Resp: (!) 21 (02/23/20 0900)  BP: (!) 127/58 (02/23/20 0900)  SpO2: 98 % (02/23/20 0900) Vital Signs (24h Range):  Temp:  [98 °F (36.7 °C)-98.5 °F (36.9 °C)] 98.1 °F (36.7 °C)  Pulse:  [] 68  Resp:  [18-36] 21  SpO2:  [80 %-100 %] 98 %  BP: ()/(38-92) 127/58     Weight: 74.4 kg (164 lb)  Body mass index is 26.47 kg/m².     SpO2: 98 %  O2 Device (Oxygen Therapy): room air      Intake/Output Summary (Last 24 hours) at 2/23/2020 0931  Last data filed at 2/23/2020 0856  Gross per 24 hour   Intake 2478.33 ml   Output 1600 ml   Net 878.33 ml       Lines/Drains/Airways     Drain            Female External Urinary Catheter 02/22/20 1415 less than 1 day          Peripheral Intravenous Line                 Peripheral IV - Single Lumen 02/21/20 2116 20 G Left Antecubital 1 day                Physical Exam   Constitutional: She appears well-developed. No distress.   Cardiovascular: Normal rate and regular rhythm. Exam reveals no gallop and no friction rub.   No murmur heard.  Pulmonary/Chest: Effort normal and breath sounds normal. No respiratory distress.   Abdominal: Soft. Bowel sounds are normal. There is no tenderness.   Musculoskeletal: She exhibits no edema.    Neurological: She is alert.   Skin: Skin is warm. She is not diaphoretic.   Psychiatric: She has a normal mood and affect.       Significant Labs:   CMP   Recent Labs   Lab 02/21/20  1633 02/22/20  0407 02/23/20  0405    138 139   K 4.3 4.1 3.7    106 107   CO2 19* 20* 22*    89 93   BUN 20 18 20   CREATININE 1.4 1.1 1.2   CALCIUM 10.0 9.5 9.0   PROT  --  6.8 6.2   ALBUMIN  --  4.2 3.6   BILITOT  --  0.8 0.8   ALKPHOS  --  53* 47*   AST  --  25 19   ALT  --  17 11   ANIONGAP 14 12 10   ESTGFRAFRICA 41* 55* 49*   EGFRNONAA 36* 48* 43*   , CBC   Recent Labs   Lab 02/21/20  1633 02/22/20  0407 02/23/20  0405   WBC 20.50* 21.28* 21.19*   HGB 12.1 11.8* 10.1*   HCT 37.8 37.5 31.5*    156 161   Troponin   Recent Labs   Lab 02/22/20  0408 02/22/20  1023 02/23/20  0405   TROPONINI 0.060* 0.084* 0.157*

## 2020-02-23 NOTE — PROGRESS NOTES
[facility] Internal Medicine Resident HO-III Progress Note    Subjective:     Overnight, no acute issues. Resting comfortably this morning. No issues of dizziness or syncope      Objective:   Last 24 Hour Vital Signs:  BP  Min: 73/38  Max: 166/63  Temp  Av.3 °F (36.8 °C)  Min: 98.1 °F (36.7 °C)  Max: 98.5 °F (36.9 °C)  Pulse  Av.2  Min: 56  Max: 129  Resp  Av.2  Min: 18  Max: 36  SpO2  Av.6 %  Min: 80 %  Max: 100 %  I/O last 3 completed shifts:  In: 2258.3 [P.O.:665; I.V.:1593.3]  Out: 1850 [Urine:1850]    Physical Examination:  General:          Alert and awake in no apparent distress  Head:               Normocephalic,   C collar in place   Eyes:               PERRL; EOMi with anicteric sclera and clear conjunctivae  Mouth:             Oropharynx clear and without exudate; moist mucous membranes  Cardio:             Regular rate, occasional dropped beats, no murmur appreciated  Resp:               CTAB; respirations unlabored; no wheezes, crackles or rhonchi  Abdom:            Soft, NTND with normoactive bowel sounds  Extrem:            Warm and well-perfused with no clubbing, cyanosis or edema  Skin:                No rashes, lesions, or color changes  Pulses:            2+ and symmetric distally  Neuro:             AAOx3; cooperative and pleasant with no focal deficits    Laboratory:  Laboratory Data Reviewed: yes  Pertinent Findings:  Trop I 0.008 -> 0.021 -> 0.060    WBC 20s, at baseline, stable    Cr 1.1, baseline, GFR 48    Microbiology Data Reviewed: yes  Pertinent Findings:  pending    Other Results:  EKG (my interpretation): no interval studies  Tele: sinus bradycardia, intermittent dropped beats/pauses    Radiology Data Reviewed: yes  Pertinent Findings:  MRI c spine: No edema type signal in the cervical spine to suggest an acute fracture or ligamentous injury.    Endplate abnormality of the C7 vertebral body identified on the CT, most likely represent a chronic fracture.    Multilevel  degenerative changes of the cervical spine with moderate narrowing the spinal canal at the C4-C5 level.    Current Medications:     Infusions:   lactated ringers 100 mL/hr at 02/23/20 1035        Scheduled:   aspirin  81 mg Oral Daily    losartan  100 mg Oral Daily    meclizine  25 mg Oral TID    pantoprazole  40 mg Oral Daily    rosuvastatin  40 mg Oral QHS        PRN:  acetaminophen, atropine, melatonin, sodium chloride 0.9%    Antibiotics and Day Number of Therapy:  N/A    Lines and Day Number of Therapy:  PIV    Assessment:     Za Limon is a 79 y.o.female with  Patient Active Problem List    Diagnosis Date Noted    Closed nondisplaced fracture of seventh cervical vertebra     Syncope 02/21/2020    Dyspnea on exertion 12/19/2019    Nonspecific abnormal electrocardiogram (ECG) (EKG) 12/19/2019    PSVT (paroxysmal supraventricular tachycardia) 12/19/2019    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 07/29/2019    Major depression, recurrent, chronic 01/28/2019    Gastroesophageal reflux disease without esophagitis 01/28/2019    History of PSVT (paroxysmal supraventricular tachycardia) 08/08/2017    Orthostatic hypotension 01/24/2017    Premature atrial contractions 01/24/2017    Second degree AV block 05/06/2016    Syncope and collapse 04/05/2016    Essential hypertension 04/05/2016    Hyperlipidemia 04/05/2016    CLL (chronic lymphocytic leukemia) 04/05/2016    First degree AV block 04/05/2016    Left anterior hemiblock 04/05/2016    Other chest pain 04/05/2016        Plan:     Syncope likely cardiogenic cause  - EKG with appearance of Mobitz type I with progressively lengthening WI interval, although difficult to discern any p wave before dropped beat  - given known arrhythmia history and past syncopal events, likely this is cause  - O cardiology consulted from ED, appreciate recs: admit to ICU to monitor closely for arrhythmia, they will follow  - have ordered atropine  prn, pacer pads placed, currently asymptomatic but will monitor closely  - initial trop negative however trended up to above reference range, will trend to peak/down. No Sx of chest pain so low suspicion for ACS   -patient with IVF running, no nystagmus on exam, no complaints of dizziness      C7 spinal fracture, undetermined chronicity  - neck/head pain after syncope today   - CT c spine with acute nondisplaced fx of anterior vertebral body C7  - ED discussed with NSGY, MRI obtained  - MRI with fx possibly chronic, will discuss with NSGY today to ensure proper follow up plan  - continue c collar and c spine precautions      CLL with Leukocytosis  - chronic elevated WBC per pt  - per chart review at baseline  - cont outpatient follow up      HTN  - cont home losartan  -stopped HCTZ     CKD II  - GFR and Cr at baseline per chart review  - Continue ARB, monitor      HLD  - cont home statin      GERD  - cont PPI      Osteoporosis  - on home alendronate      Mood Disorder  - will confirm if on lexapro       Diet: NPO in case of procedure w cards  Ppx: SCDs, transition to SQH if no procedure today  Dispo: pending cardiology recommendations    Mik Nguyen  [facility] Internal Medicine HO-III  U IM Service Team B    [facility] Medicine Hospitalist Pager numbers:   [facility] Hospitalist Medicine Team A (Parker/Ngozi): [phone]  [facility] Hospitalist Medicine Team B (Ant/Mary):  [phone]

## 2020-02-23 NOTE — NURSING
Notified Dr. Melvin via telephone of Dr. Isaiah jurado and recommends pausing IVFs at this time. No new orders received at this time, she informed me she would inform the team.

## 2020-02-23 NOTE — HOSPITAL COURSE
"2/23/2020: Noted to be positive for orthostatic hypotension (With standing: HR 70s--> 130s; SBP 1102-->70s). HCTZ held. Started on IVF  cc/ hr (receieved for 12 hours). Reports feeling overall better today.    Reported h/o 2nd degree AV block in cardiology notes May 2016. I do not see an ECG or Holter report from that time to verify this. The same note, repots "ECG-NSR with first degree AV block, LAHB". On subsequent follow-up recommended to monitor off metoprolol, and to cut back on HCTZ given concerns for orthostatic hypotension. Her symptoms resolved with these changes.     Reviewed ECGs this admission and telemetry on the floor reviewed.     Noted to have sinus rhythm with some sinus arrhythmia, and blocked PACs. First degree AVB present  ECG done 2/22/20 at 9.45 am, concerning for wandering atrial pacemaker  I do not see evidence of Wenckebach or other higher grade AV block  The longest pause after a blocked PAC is 1536 ms (coresponding to a HR 38 bpm, for that interval). A similar episode (?longer pause) may have contributed to some of her prior syncopal event.     Per patient's description of the events leading to the fall, concern for orthostatic hypotension. Orthostatic BP significantly positive yesterday. HCTZ discontinued. Started on IVF LR (received about 1200 cc today).     K WNL. Clinical scenario does not support MI, ADHF, or underlying infectious etiology. Continue to watch on telemetry at this time. Echocardiogram. Not on AV chet blockers.     Please keep NPO after midnight tonight.    2/24/2020 Case discussed by Dr. Colon with Dr. Cruz with no indication for pacemaker therefore NPO status cancelled. CBC and BMP reviewed and WNL. HR 60s-70s overnight with HR up to 120s this AM with ambulation around the room. Troponin trended with initial troponin .021 with peak at .157 and down to .126. On IVF for orthostasis with plans for repeat orthostatics today.   "

## 2020-02-23 NOTE — NURSING
Notified Dr. Melvin of bladder scan results and that patient was able to void on bedpan. No new orders received.  See output and bladder scan results in I/O flow sheets.

## 2020-02-23 NOTE — PLAN OF CARE
Patient stable and NAD noted, denies sob, pain, n/v and dizziness. Overall condition remains unchanged, vss see flowsheet. Pt with Tele transfer orders; awaiting a bed. Patient is aware. Call light in reach, safety maintained, will continue to monitor.

## 2020-02-24 ENCOUNTER — TELEPHONE (OUTPATIENT)
Dept: NEUROSURGERY | Facility: CLINIC | Age: 80
End: 2020-02-24

## 2020-02-24 ENCOUNTER — TELEPHONE (OUTPATIENT)
Dept: FAMILY MEDICINE | Facility: CLINIC | Age: 80
End: 2020-02-24

## 2020-02-24 ENCOUNTER — TELEPHONE (OUTPATIENT)
Dept: CARDIOLOGY | Facility: CLINIC | Age: 80
End: 2020-02-24

## 2020-02-24 VITALS
WEIGHT: 164 LBS | BODY MASS INDEX: 26.36 KG/M2 | OXYGEN SATURATION: 97 % | SYSTOLIC BLOOD PRESSURE: 125 MMHG | RESPIRATION RATE: 18 BRPM | TEMPERATURE: 98 F | HEART RATE: 67 BPM | HEIGHT: 66 IN | DIASTOLIC BLOOD PRESSURE: 58 MMHG

## 2020-02-24 DIAGNOSIS — I44.1 SECOND DEGREE AV BLOCK: Primary | ICD-10-CM

## 2020-02-24 DIAGNOSIS — S12.9XXA CLOSED FRACTURE OF CERVICAL VERTEBRA, UNSPECIFIED CERVICAL VERTEBRAL LEVEL, INITIAL ENCOUNTER: Primary | ICD-10-CM

## 2020-02-24 LAB
ALBUMIN SERPL BCP-MCNC: 3.3 G/DL (ref 3.5–5.2)
ALP SERPL-CCNC: 42 U/L (ref 55–135)
ALT SERPL W/O P-5'-P-CCNC: 10 U/L (ref 10–44)
ANION GAP SERPL CALC-SCNC: 8 MMOL/L (ref 8–16)
AST SERPL-CCNC: 18 U/L (ref 10–40)
BASOPHILS NFR BLD: 0 % (ref 0–1.9)
BILIRUB SERPL-MCNC: 0.6 MG/DL (ref 0.1–1)
BUN SERPL-MCNC: 17 MG/DL (ref 8–23)
BURR CELLS BLD QL SMEAR: ABNORMAL
CALCIUM SERPL-MCNC: 8.7 MG/DL (ref 8.7–10.5)
CHLORIDE SERPL-SCNC: 108 MMOL/L (ref 95–110)
CO2 SERPL-SCNC: 23 MMOL/L (ref 23–29)
CREAT SERPL-MCNC: 1.2 MG/DL (ref 0.5–1.4)
DIFFERENTIAL METHOD: ABNORMAL
EOSINOPHIL NFR BLD: 5 % (ref 0–8)
ERYTHROCYTE [DISTWIDTH] IN BLOOD BY AUTOMATED COUNT: 13.4 % (ref 11.5–14.5)
EST. GFR  (AFRICAN AMERICAN): 49 ML/MIN/1.73 M^2
EST. GFR  (NON AFRICAN AMERICAN): 43 ML/MIN/1.73 M^2
GLUCOSE SERPL-MCNC: 102 MG/DL (ref 70–110)
HCT VFR BLD AUTO: 30.7 % (ref 37–48.5)
HGB BLD-MCNC: 9.9 G/DL (ref 12–16)
IMM GRANULOCYTES # BLD AUTO: ABNORMAL K/UL (ref 0–0.04)
IMM GRANULOCYTES NFR BLD AUTO: ABNORMAL % (ref 0–0.5)
LYMPHOCYTES NFR BLD: 54 % (ref 18–48)
MCH RBC QN AUTO: 31.1 PG (ref 27–31)
MCHC RBC AUTO-ENTMCNC: 32.2 G/DL (ref 32–36)
MCV RBC AUTO: 97 FL (ref 82–98)
MONOCYTES NFR BLD: 5 % (ref 4–15)
NEUTROPHILS NFR BLD: 36 % (ref 38–73)
NRBC BLD-RTO: 0 /100 WBC
OVALOCYTES BLD QL SMEAR: ABNORMAL
PATH REV BLD -IMP: NORMAL
PLATELET # BLD AUTO: 145 K/UL (ref 150–350)
PLATELET BLD QL SMEAR: ABNORMAL
PMV BLD AUTO: 11.7 FL (ref 9.2–12.9)
POIKILOCYTOSIS BLD QL SMEAR: SLIGHT
POTASSIUM SERPL-SCNC: 4.4 MMOL/L (ref 3.5–5.1)
PROT SERPL-MCNC: 5.8 G/DL (ref 6–8.4)
RBC # BLD AUTO: 3.18 M/UL (ref 4–5.4)
SODIUM SERPL-SCNC: 139 MMOL/L (ref 136–145)
WBC # BLD AUTO: 19.79 K/UL (ref 3.9–12.7)

## 2020-02-24 PROCEDURE — 97530 THERAPEUTIC ACTIVITIES: CPT

## 2020-02-24 PROCEDURE — 97535 SELF CARE MNGMENT TRAINING: CPT

## 2020-02-24 PROCEDURE — 99233 PR SUBSEQUENT HOSPITAL CARE,LEVL III: ICD-10-PCS | Mod: ,,, | Performed by: INTERNAL MEDICINE

## 2020-02-24 PROCEDURE — 80053 COMPREHEN METABOLIC PANEL: CPT

## 2020-02-24 PROCEDURE — 36415 COLL VENOUS BLD VENIPUNCTURE: CPT

## 2020-02-24 PROCEDURE — 25000003 PHARM REV CODE 250: Performed by: STUDENT IN AN ORGANIZED HEALTH CARE EDUCATION/TRAINING PROGRAM

## 2020-02-24 PROCEDURE — 97165 OT EVAL LOW COMPLEX 30 MIN: CPT

## 2020-02-24 PROCEDURE — 85007 BL SMEAR W/DIFF WBC COUNT: CPT

## 2020-02-24 PROCEDURE — 99233 SBSQ HOSP IP/OBS HIGH 50: CPT | Mod: ,,, | Performed by: INTERNAL MEDICINE

## 2020-02-24 PROCEDURE — 97161 PT EVAL LOW COMPLEX 20 MIN: CPT

## 2020-02-24 PROCEDURE — 85027 COMPLETE CBC AUTOMATED: CPT

## 2020-02-24 RX ORDER — MECLIZINE HYDROCHLORIDE 25 MG/1
25 TABLET ORAL 3 TIMES DAILY
Qty: 30 TABLET | Refills: 0 | Status: SHIPPED | OUTPATIENT
Start: 2020-02-24 | End: 2023-01-30

## 2020-02-24 RX ORDER — LOSARTAN POTASSIUM 100 MG/1
100 TABLET ORAL DAILY
Qty: 90 TABLET | Refills: 3 | Status: SHIPPED | OUTPATIENT
Start: 2020-02-24 | End: 2020-03-09 | Stop reason: SDUPTHER

## 2020-02-24 RX ADMIN — ASPIRIN 81 MG: 81 TABLET, COATED ORAL at 08:02

## 2020-02-24 RX ADMIN — PANTOPRAZOLE SODIUM 40 MG: 40 TABLET, DELAYED RELEASE ORAL at 08:02

## 2020-02-24 RX ADMIN — LOSARTAN POTASSIUM 100 MG: 50 TABLET, FILM COATED ORAL at 12:02

## 2020-02-24 RX ADMIN — MECLIZINE HYDROCHLORIDE 25 MG: 25 TABLET ORAL at 08:02

## 2020-02-24 NOTE — ASSESSMENT & PLAN NOTE
-mentioned in cards note from 2016 with no previous Holter noted  -monitored on telemetry with no evidence of 2nd degree AVB   -noted NSR with sinus arrhythmia, and blocked PACs as well as 1st degree AVB present  -EKG 2/22/20 at 9.45 am, concerning for wandering atrial pacemaker  -case reviewed by Dr. Colon with Dr. Cruz with no indication for pacemaker as of now; syncope felt to be more orthostasis related  -IVF until no longer symptomatic from orthostatics; will arrange for Holter monitor as an outpatient

## 2020-02-24 NOTE — TELEPHONE ENCOUNTER
Scheduled 48 hour monitor and follow up visit with Dr Blanco.  Tried to reach patient at home.  Will attempt to reach patient on Wednesday when we are back in the office.  Left message on patient's voice mail.    ----- Message from JOIE Ruff ANP sent at 2/24/2020 10:59 AM CST -----  Contact: Celine  Patient admitted for syncope with C7 fracture. Felt to be orthostasis related. Concern for AVB on telemetry per primary team. No evidence of 2nd degree AVB. Evidence of GRACIELA and blocked PACs. Please arrange for 48 hour  Holter and follow up with Dr. Blanco after discharge. She should be discharged either today or tomorrow     Thanks  BH

## 2020-02-24 NOTE — PLAN OF CARE
Problem: Physical Therapy Goal  Goal: Physical Therapy Goal  Description  Goals to be met by: 2/24/2020     No acute skilled PT goals            Outcome: Met   Recommend Home with HH for Home environment assessment  Patient would benefit from RW   Patient close to functional mobility baseline  Will DC PT service at this time 2/2 Hospital Discharge

## 2020-02-24 NOTE — NURSING
1535 Pt being discharged home in stable condition. AVS packet given to pt. KENNETH Chan to go over discharged instructions with pt and pt's daughter.

## 2020-02-24 NOTE — TELEPHONE ENCOUNTER
----- Message from Dina Masters MA sent at 2/24/2020  1:20 PM CST -----  Contact: pt @ [phone]  Is it possible to get this patient in with someone in [place] this week?    Thanks      ----- Message -----  From: Deanna Trejo  Sent: 2/24/2020   1:09 PM CST  To: [facility] Neurosurgery Clinical Support    Dr. Paul called to schedule an hospital follow up appt with Neurosurgery for patient, wants patient seen this week. Patient fainted an broke her neck, C7 vertebre anterior body. Please call.

## 2020-02-24 NOTE — PROGRESS NOTES
"[facility]  Cardiology  Progress Note    Patient Name: Za Limon  MRN: [id number]  Admission Date: 2/21/2020  Hospital Length of Stay: 3 days  Code Status: Full Code   Attending Physician: Joshua Canales MD   Primary Care Physician: Mina Solorzano MD  Expected Discharge Date:   Principal Problem:Syncope    Subjective:     Hospital Course:   2/23/2020: Noted to be positive for orthostatic hypotension (With standing: HR 70s--> 130s; SBP 1102-->70s). HCTZ held. Started on IVF  cc/ hr (receieved for 12 hours). Reports feeling overall better today.    Reported h/o 2nd degree AV block in cardiology notes May 2016. I do not see an ECG or Holter report from that time to verify this. The same note, repots "ECG-NSR with first degree AV block, LAHB". On subsequent follow-up recommended to monitor off metoprolol, and to cut back on HCTZ given concerns for orthostatic hypotension. Her symptoms resolved with these changes.     Reviewed ECGs this admission and telemetry on the floor reviewed.     Noted to have sinus rhythm with some sinus arrhythmia, and blocked PACs. First degree AVB present  ECG done 2/22/20 at 9.45 am, concerning for wandering atrial pacemaker  I do not see evidence of Wenckebach or other higher grade AV block  The longest pause after a blocked PAC is 1536 ms (coresponding to a HR 38 bpm, for that interval). A similar episode (?longer pause) may have contributed to some of her prior syncopal event.     Per patient's description of the events leading to the fall, concern for orthostatic hypotension. Orthostatic BP significantly positive yesterday. HCTZ discontinued. Started on IVF LR (received about 1200 cc today).     K WNL. Clinical scenario does not support MI, ADHF, or underlying infectious etiology. Continue to watch on telemetry at this time. Echocardiogram. Not on AV chet blockers.     Please keep NPO after midnight tonight.    2/24/2020 Case discussed by Dr. Colon with  " Anthony with no indication for pacemaker therefore NPO status cancelled. CBC and BMP reviewed and WNL. HR 60s-70s overnight with HR up to 120s this AM with ambulation around the room. Troponin trended with initial troponin .021 with peak at .157 and down to .126. On IVF for orthostasis with plans for repeat orthostatics today.         Review of Systems   Constitution: Negative for chills, decreased appetite, diaphoresis, fever, malaise/fatigue, weight gain and weight loss.   Cardiovascular: Negative for chest pain, claudication, dyspnea on exertion, irregular heartbeat, leg swelling, near-syncope, orthopnea, palpitations and paroxysmal nocturnal dyspnea.   Respiratory: Negative for cough, shortness of breath, snoring, sputum production and wheezing.    Endocrine: Negative for cold intolerance, heat intolerance, polydipsia, polyphagia and polyuria.   Skin: Negative for color change, dry skin, itching, nail changes and poor wound healing.   Musculoskeletal: Negative for back pain, gout, joint pain and joint swelling.   Gastrointestinal: Negative for bloating, abdominal pain, constipation, diarrhea, hematemesis, hematochezia, melena, nausea and vomiting.   Genitourinary: Negative for dysuria, hematuria and nocturia.   Neurological: Negative for dizziness, headaches, light-headedness, numbness, paresthesias and weakness.   Psychiatric/Behavioral: Negative for altered mental status, depression and memory loss.     Objective:     Vital Signs (Most Recent):  Temp: 97.4 °F (36.3 °C) (02/24/20 0743)  Pulse: 80 (02/24/20 0743)  Resp: 16 (02/24/20 0743)  BP: 109/62 (02/24/20 0743)  SpO2: 97 % (02/24/20 0504) Vital Signs (24h Range):  Temp:  [97.4 °F (36.3 °C)-98.4 °F (36.9 °C)] 97.4 °F (36.3 °C)  Pulse:  [57-90] 80  Resp:  [16-41] 16  SpO2:  [94 %-100 %] 97 %  BP: ()/() 109/62     Weight: 74.4 kg (164 lb)  Body mass index is 26.47 kg/m².     SpO2: 97 %  O2 Device (Oxygen Therapy): room air      Intake/Output  Summary (Last 24 hours) at 2/24/2020 1047  Last data filed at 2/23/2020 2100  Gross per 24 hour   Intake 330 ml   Output 1590 ml   Net -1260 ml       Lines/Drains/Airways     Peripheral Intravenous Line                 Peripheral IV - Single Lumen 02/23/20 1700 20 G Right Forearm less than 1 day                    .  Significant Labs:     Recent Labs   Lab 02/24/20  0407   WBC 19.79*   RBC 3.18*   HGB 9.9*   HCT 30.7*   *   MCV 97   MCH 31.1*   MCHC 32.2     Recent Labs   Lab 02/24/20  0407      K 4.4      CO2 23   BUN 17   CREATININE 1.2       Significant Imaging: Echocardiogram:   Transthoracic echo (TTE) complete (Cupid Only):   Results for orders placed or performed during the hospital encounter of 02/21/20   Echo Color Flow Doppler? Yes   Result Value Ref Range    BSA 1.86 m2    LA WIDTH 4.40 cm    LVIDD 4.00 3.5 - 6.0 cm    IVS 0.80 0.6 - 1.1 cm    PW 0.80 0.6 - 1.1 cm    LVIDS 2.80 2.1 - 4.0 cm    FS 30 28 - 44 %    LA volume 44.45 cm3    STJ 3.10 cm    Ascending aorta 3.30 cm    LV mass 93.46 g    LA size 2.80 cm    Left Ventricle Relative Wall Thickness 0.40 cm    AV Velocity Ratio 0.75     LVOT diameter 1.80 cm    LVOT area 2.5 cm2    LVOT peak paul 0.9 m/s    LVOT peak VTI 18.00 cm    Ao peak paul 1.2 m/s    LVOT stroke volume 45.78 cm3    AV peak gradient 6 mmHg    TR Max Paul 2.21 m/s    LA Volume Index 24.2 mL/m2    LV Mass Index 51 g/m2    RA Major Axis 4.10 cm    Left Atrium Minor Axis 4.10 cm    Left Atrium Major Axis 4.40 cm    Triscuspid Valve Regurgitation Peak Gradient 20 mmHg    RA Width 3.90 cm    Narrative    · Normal left ventricular systolic function. The estimated ejection   fraction is 65%.  · Normal LV diastolic function.  · Normal right ventricular systolic function.  · The sinuses of Valsalva is dilated.  · Mild aortic regurgitation.  · Mild mitral regurgitation.  · Moderate pulmonic regurgitation.        Assessment and Plan:     Brief HPI: Seen this morning on AM NP  rounds after walking to the bathroom. Reports feeling better since admission with no recurrent dizziness. Discussed cardiac POC as detailed below-verbalized understanding and agrees with POC     * Syncope  -presented with dizziness and syncope; noted to orthostatic positive with  standing: HR 70s--> 130s; SBP 1102-->70s). HCTZ held. Started on IVF initially with  cc/ hr for 12 hours followed by 75cc/hr  -reports feeling overall better today  -monitored on telemetry with no 2nd degree AVB noted  -echocardiogram with normal LVEF  -continue to hold AV chet blockers     Second degree AV block  -mentioned in cards note from 2016 with no previous Holter noted  -monitored on telemetry with no evidence of 2nd degree AVB   -noted NSR with sinus arrhythmia, and blocked PACs as well as 1st degree AVB present  -EKG 2/22/20 at 9.45 am, concerning for wandering atrial pacemaker  -case reviewed by Dr. Colon with Dr. Cruz with no indication for pacemaker as of now; syncope felt to be more orthostasis related  -IVF until no longer symptomatic from orthostatics; will arrange for Holter monitor as an outpatient       Essential hypertension  -SBP 90-s130s/50s-60s overnight  -continue Losartan   -may need to completely discontinue HCTZ upon discharge         VTE Risk Mitigation (From admission, onward)         Ordered     IP VTE HIGH RISK PATIENT  Once      02/21/20 2158     Place sequential compression device  Until discontinued      02/21/20 2158                JOIE Saleh, ANP  Cardiology  [facility]

## 2020-02-24 NOTE — TELEPHONE ENCOUNTER
----- Message from Deanna Trejo sent at 2/24/2020  1:09 PM CST -----  Contact: pt @ [phone]  Dr. Paul called to schedule an hospital follow up appt with Neurosurgery for patient, wants patient seen this week. Patient fainted an broke her neck, C7 vertebre anterior body. Please call.

## 2020-02-24 NOTE — PT/OT/SLP EVAL
Occupational Therapy   Evaluation/Treatment/Dc Summary     Name: Za Limon  MRN: [id number]  Admitting Diagnosis:  Syncope      Recommendations:     Discharge Recommendations: home health PT, home health OT  Discharge Equipment Recommendations:  walker, rolling, shower chair  Barriers to discharge:  Decreased caregiver support    Assessment:     Za Limon is a 79 y.o. female with a medical diagnosis of Syncope. Performance deficits affecting function: orthopedic precautions, impaired self care skills, impaired cardiopulmonary response to activity    Pt set for d/c this date. Educated on home safety, spinal precautions, adaptive dsg and recs for RW and shower chair. Recommending HHOT/PT at d/c .      Rehab Prognosis: Good; patient would benefit from acute skilled OT services to address these deficits and reach maximum level of function.       Plan:     Patient to be seen (pt set up for d/c this date ) to address the above listed problems via    · Plan of Care Expires: 02/24/20  · Plan of Care Reviewed with: patient    Subjective     Chief Complaint: concerned with receiving RW and d/c'ing home   Patient/Family Comments/goals: return home     Occupational Profile:  Living Environment: alone, SSH, no steps to enter, t/s combo   Previous level of function: independent; drives; performs IADLs   Equipment Used at Home:  none  Assistance upon Discharge: reports dghtr lives in [place], however, will be able to help her as needed     Pain/Comfort:  · Pain Rating 1: 0/10    Patients cultural, spiritual, [employer] conflicts given the current situation:      Objective:     Communicated with: aguilar prior to session.    General Precautions: Standard, fall   Orthopedic Precautions:spinal precautions   Braces: Aspen collar     Occupational Performance:        Functional Mobility/Transfers:  · Patient completed Sit <> Stand Transfer with stand by assistance  with  rolling walker   · Patient completed Toilet Transfer Step  Transfer technique with stand by assistance with  rolling walker  · Functional Mobility: SBA-supervision with RW in room and hallway     Activities of Daily Living:  · Grooming: supervision standing at sink   · Lower Body Dressing: contact guard assistance jefe underwear with figure 4 technique   · Toileting: supervision and stand by assistance in bathroom     Cognitive/Visual Perceptual:  WFL     Physical Exam:  Balance:    -       supervision  Postural examination/scapula alignment:    -       Rounded shoulders  Skin integrity: Visible skin intact  Upper Extremity Range of Motion:   WFL for pt's needs within spinal precautions   Upper Extremity Strength:  WFL for pt's needs based on observed function     AMPA 6 Click ADL:  AMPAC Total Score: 22    Treatment & Education:  Pt found [facility]   Educated on spinal precautions; also educated on adaptive ADLs to maintain spinal precautions; handout given with spinal precautions   Donned underwear while seated with figure 4 technique; initally min AEducation:   2/2 assist to thread LLE (reports deficits in ROM of LLE); redonned with threading LLE first and pt able to perform CGA/SBA.   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

## 2020-02-24 NOTE — PLAN OF CARE
Pt is AAOx3. No complaints of nausea, vomiting, or diarrhea. No complaints of pain. Sinus arrhythmia on the monitor. No complaints of dizziness or weakness. Continuous IV fluids maintained. Urinated per bedpan with no issues. Safety precautions maintained. Tolerated all medications well.

## 2020-02-24 NOTE — PT/OT/SLP EVAL
Physical Therapy Evaluation and Discharge Note    Patient Name:  Za Limon   MRN:  [id number]    Recommendations:     Discharge Recommendations:  home with home health   Discharge Equipment Recommendations: walker, rolling, shower chair   Barriers to discharge: None    Assessment:     Za Limon is a 79 y.o. female admitted with a medical diagnosis of Syncope. .  At this time, patient is functioning near their prior level of function and does not require further acute PT services. Recommend  for Home assessment     Recent Surgery: * No surgery found *      Plan:     During this hospitalization, patient does not require further acute PT services.  Please re-consult if situation changes.      Subjective     Chief Complaint: weakness LE after lying in bed since Friday  Patient/Family Comments/goals: go home  Pain/Comfort:  · Pain Rating 1: 0/10  · Pain Rating Post-Intervention 1: 0/10    Patients cultural, spiritual, L[employer] conflicts given the current situation:      Living Environment:  Lives alone H no steps to enter t/s combo  Prior to admission, patients level of function was independent.  Equipment used at home: none.  DME owned (not currently used): none.  Upon discharge, patient will have assistance from friends.    Objective:     Communicated with primary nurse prior to session.  Patient found HOB elevated with peripheral IV, bed alarm upon PT entry to room.    General Precautions: Standard, fall   Orthopedic Precautions:spinal precautions   Braces: Aspen collar     Exams:  · RLE ROM: WFL  · RLE Strength: WFL  · LLE ROM: WFL  · LLE Strength: WFL    Functional Mobility:  · Bed Mobility:     · Supine to Sit: modified independence  · Sit to Supine: modified independence  · Transfers:     · Sit to Stand:  modified independence with no AD  · Gait: 100 ft with RW supervision   · Balance: good with AD    AM-PAC 6 CLICK MOBILITY  Total Score:22       Therapeutic Activities and Exercises:   na    AM-PAC 6  CLICK MOBILITY  Total Score:22     Patient left HOB elevated with all lines intact, call button in reach and bed alarm on.    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

## 2020-02-24 NOTE — CONSULTS
[facility]  Cardiology  Consult Note    Patient Name: Za Limon  MRN: [id number]  Admission Date: 2/21/2020  Hospital Length of Stay: 3 days  Code Status: Full Code   Attending Provider: Joshua Canales MD   Consulting Provider: JOIE Saleh ANP  Primary Care Physician: Mina Solorzano MD  Principal Problem:Syncope      Inpatient consult to Cardiology  Consult performed by: JOIE Ruff, DEBBIE  Consult ordered by: Mik Nguyen MD        See full consult note dated 2/22/2020 by Dr. Clare Colon

## 2020-02-24 NOTE — PROGRESS NOTES
Orthostatic BP     02/24/20 1112 02/24/20 1115 02/24/20 1118   Vital Signs   Pulse 66 72 109   Heart Rate Source Monitor Monitor Monitor   BP (!) 127/59 126/63 137/60   BP Location Left arm Left arm Left arm   BP Method Automatic Automatic Automatic   Patient Position Lying Sitting Standing

## 2020-02-24 NOTE — PLAN OF CARE
TN met with patient at the bedside. Currently, patient lives at home alone and was independent with all ADL's prior to admission. No DME or HH noted. Upon discharge, patient states that her daughter Keshia [phone] will provide transportation home. Also patient's daughter would be available to assist as needed. Patient's PCP is Dr. Solorzano.  Patient is requesting a rolling walker at discharge, per therapy recommendations.    TN  updated whiteboard with contact information. Blue discharge folder and discharge brochure given to patient. TN instructed patient to contact TN if she has any further questions or concerns. TN will continue to follow.         02/24/20 1142   Discharge Assessment   Assessment Type Discharge Planning Assessment   Assessment information obtained from? Patient   Expected Length of Stay (days) 3   Communicated expected length of stay with patient/caregiver yes   Prior to hospitilization cognitive status: Alert/Oriented   Prior to hospitalization functional status: Independent   Current cognitive status: Alert/Oriented   Current Functional Status: Assistive Equipment   Lives With alone   Able to Return to Prior Arrangements yes   Is patient able to care for self after discharge? Yes   Patient's perception of discharge disposition home or selfcare   Readmission Within the Last 30 Days no previous admission in last 30 days   Patient currently being followed by outpatient case management? No   Patient currently receives any other outside agency services? No   Equipment Currently Used at Home none   Do you have any problems affording any of your prescribed medications? No   Is the patient taking medications as prescribed? yes   Does the patient have transportation home? Yes   Transportation Anticipated family or friend will provide   Does the patient receive services at the [facility]? No   Discharge Plan A Home   Discharge Plan B Home;Home Health   DME Needed Upon Discharge  walker, rolling    Patient/Family in Agreement with Plan yes

## 2020-02-24 NOTE — PLAN OF CARE
Problem: Occupational Therapy Goal  Goal: Occupational Therapy Goal  Outcome: Adequate for Care Transition       Pt set for d/c this date. Educated on home safety, spinal precautions, adaptive dsg and recs for RW and shower chair. Recommending HHOT/PT at d/c

## 2020-02-24 NOTE — ASSESSMENT & PLAN NOTE
-SBP 90-s130s/50s-60s overnight  -continue Losartan   -may need to completely discontinue HCTZ upon discharge

## 2020-02-24 NOTE — PLAN OF CARE
VN reviewed discharge instructions with pt.  AVS printed and handed to pt by bedside nurse.  Reviewed followup appts, medication, diet, and importance of medication compliance.  Reviewed home care instructions, treatment plan, self management and when to seek medical attention.  Allowed time for questions, all questions answered.  Pt verbalized complete understanding of discharge instructions and voices no concerns.

## 2020-02-24 NOTE — PLAN OF CARE
Progress notes reviewed. Evening rounds completed. Introduced self as VN for this shift. Educated pt on VN's role in patient's care.  Plan of care reviewed with patient. Opportunity given for pt's questions. No questions or concerns expressed at this time. Cervical neck brace in place . IVFs in progress . Requesting bedpan. PCT notified.VN to continue to monitor.

## 2020-02-24 NOTE — NURSING
Received patient from ICU. She is AAO X 4. Wearing cervical collar. Connected to tele monitor. Will continue to monitor patient.

## 2020-02-24 NOTE — PLAN OF CARE
Discharge rounds on patient. Rollator to be delivred to patient's home tomorrow 2/25. Patient is aware. Home health orders sent to [facility] for placement.  Dr. Solorzano's office will call the patient with her hospital follow up appointment. Discussed followup appointments, blue discharge folder, discharge nurse will go over home medications and reasons for medications and final discharge instructions. All patient/caregiver questions answered. Patient verbalized understanding.      4:50pm- TN received a call from Prema at [facility] stating that the patient has been assigned to the [facility] for  services. TN to call patient.          02/24/20 0715   Final Note   Assessment Type Discharge Planning Assessment   Anticipated Discharge Disposition Home-Health  ([facility])   What phone number can be called within the next 1-3 days to see how you are doing after discharge? [phone]   Hospital Follow Up  Appt(s) scheduled? Yes   Discharge plans and expectations educations in teach back method with documentation complete? Yes   Right Care Referral Info   Post Acute Recommendation No Care   Referral Type Home Care   Facility Name [facility]     Future Appointments   Date Time Provider Department Center   3/2/2020 10:00 AM [facility], HOLTER MONITORS [facility] CARD [facility]   3/9/2020  9:30 AM Stepan Blanco MD [facility] CARDIO [facility]   6/22/2020 10:00 AM Stepan Blanco MD [facility] CARDIO [facility]

## 2020-02-24 NOTE — TELEPHONE ENCOUNTER
----- Message from Guerline Stone sent at 2/24/2020 11:58 AM CST -----  Contact: Pt  Pt is requesting a callback from office regarding a hospital f/u visit within 7-10 days     Pt can be reached at [phone] or call daughter Keshia at [phone]

## 2020-02-24 NOTE — ASSESSMENT & PLAN NOTE
-presented with dizziness and syncope; noted to orthostatic positive with  standing: HR 70s--> 130s; SBP 1102-->70s). HCTZ held. Started on IVF initially with  cc/ hr for 12 hours followed by 75cc/hr  -reports feeling overall better today  -monitored on telemetry with no 2nd degree AVB noted  -echocardiogram with normal LVEF  -continue to hold AV chte blockers

## 2020-02-24 NOTE — SUBJECTIVE & OBJECTIVE
Review of Systems   Constitution: Negative for chills, decreased appetite, diaphoresis, fever, malaise/fatigue, weight gain and weight loss.   Cardiovascular: Negative for chest pain, claudication, dyspnea on exertion, irregular heartbeat, leg swelling, near-syncope, orthopnea, palpitations and paroxysmal nocturnal dyspnea.   Respiratory: Negative for cough, shortness of breath, snoring, sputum production and wheezing.    Endocrine: Negative for cold intolerance, heat intolerance, polydipsia, polyphagia and polyuria.   Skin: Negative for color change, dry skin, itching, nail changes and poor wound healing.   Musculoskeletal: Negative for back pain, gout, joint pain and joint swelling.   Gastrointestinal: Negative for bloating, abdominal pain, constipation, diarrhea, hematemesis, hematochezia, melena, nausea and vomiting.   Genitourinary: Negative for dysuria, hematuria and nocturia.   Neurological: Negative for dizziness, headaches, light-headedness, numbness, paresthesias and weakness.   Psychiatric/Behavioral: Negative for altered mental status, depression and memory loss.     Objective:     Vital Signs (Most Recent):  Temp: 97.4 °F (36.3 °C) (02/24/20 0743)  Pulse: 80 (02/24/20 0743)  Resp: 16 (02/24/20 0743)  BP: 109/62 (02/24/20 0743)  SpO2: 97 % (02/24/20 0504) Vital Signs (24h Range):  Temp:  [97.4 °F (36.3 °C)-98.4 °F (36.9 °C)] 97.4 °F (36.3 °C)  Pulse:  [57-90] 80  Resp:  [16-41] 16  SpO2:  [94 %-100 %] 97 %  BP: ()/() 109/62     Weight: 74.4 kg (164 lb)  Body mass index is 26.47 kg/m².     SpO2: 97 %  O2 Device (Oxygen Therapy): room air      Intake/Output Summary (Last 24 hours) at 2/24/2020 1047  Last data filed at 2/23/2020 2100  Gross per 24 hour   Intake 330 ml   Output 1590 ml   Net -1260 ml       Lines/Drains/Airways     Peripheral Intravenous Line                 Peripheral IV - Single Lumen 02/23/20 1700 20 G Right Forearm less than 1 day                    .  Significant Labs:      Recent Labs   Lab 02/24/20  0407   WBC 19.79*   RBC 3.18*   HGB 9.9*   HCT 30.7*   *   MCV 97   MCH 31.1*   MCHC 32.2     Recent Labs   Lab 02/24/20  0407      K 4.4      CO2 23   BUN 17   CREATININE 1.2       Significant Imaging: Echocardiogram:   Transthoracic echo (TTE) complete (Cupid Only):   Results for orders placed or performed during the hospital encounter of 02/21/20   Echo Color Flow Doppler? Yes   Result Value Ref Range    BSA 1.86 m2    LA WIDTH 4.40 cm    LVIDD 4.00 3.5 - 6.0 cm    IVS 0.80 0.6 - 1.1 cm    PW 0.80 0.6 - 1.1 cm    LVIDS 2.80 2.1 - 4.0 cm    FS 30 28 - 44 %    LA volume 44.45 cm3    STJ 3.10 cm    Ascending aorta 3.30 cm    LV mass 93.46 g    LA size 2.80 cm    Left Ventricle Relative Wall Thickness 0.40 cm    AV Velocity Ratio 0.75     LVOT diameter 1.80 cm    LVOT area 2.5 cm2    LVOT peak paul 0.9 m/s    LVOT peak VTI 18.00 cm    Ao peak paul 1.2 m/s    LVOT stroke volume 45.78 cm3    AV peak gradient 6 mmHg    TR Max Paul 2.21 m/s    LA Volume Index 24.2 mL/m2    LV Mass Index 51 g/m2    RA Major Axis 4.10 cm    Left Atrium Minor Axis 4.10 cm    Left Atrium Major Axis 4.40 cm    Triscuspid Valve Regurgitation Peak Gradient 20 mmHg    RA Width 3.90 cm    Narrative    · Normal left ventricular systolic function. The estimated ejection   fraction is 65%.  · Normal LV diastolic function.  · Normal right ventricular systolic function.  · The sinuses of Valsalva is dilated.  · Mild aortic regurgitation.  · Mild mitral regurgitation.  · Moderate pulmonic regurgitation.

## 2020-02-24 NOTE — PROGRESS NOTES
[facility] Resident [facility] Progress Note    Subjective:     Feels well today. No dizziness, syncope, chest pains, palpitation. Eating, drinking, stooling, voiding, ambulating appropriately. Denies pain.      Objective:   Last 24 Hour Vital Signs:  BP  Min: 92/46  Max: 164/101  Temp  Av.1 °F (36.7 °C)  Min: 97.4 °F (36.3 °C)  Max: 98.4 °F (36.9 °C)  Pulse  Av  Min: 57  Max: 90  Resp  Av.1  Min: 16  Max: 41  SpO2  Av.8 %  Min: 94 %  Max: 100 %  I/O last 3 completed shifts:  In: 2563.3 [P.O.:720; I.V.:1843.3]  Out: 2590 [Urine:2590]    Physical Examination:    General: Well-appearing, no acute distress  Head: No obvious abnormality  Eyes: EOMI, PERRLA, conjunctiva clear  Ears: Defer  Nose: Moist mucosa, no drainage  Throat: Moist mucosa, teeth intact  Neck: FROM  Lungs: Clear to auscultation, breathing unlabored  CV: Irregular rate and rhythm without murmurs, rubs, gallops  Abdomen: Flat abdomen, no tenderness to palpation  Genital: Defer  Extremities: Moving spontaneously, no trauma  Skin: Warm and dry, no rash  Neurological: Defer  Psych: Appropriate affect      Laboratory:  Laboratory Data Reviewed: yes  Pertinent Findings:  None    Microbiology Data Reviewed: yes  Pertinent Findings:  None    Other Results:  EKG (my interpretation):  None    Radiology Data Reviewed: yes  Pertinent Findings:  None    Current Medications:     Infusions:   lactated ringers 75 mL/hr at 20        Scheduled:   aspirin  81 mg Oral Daily    losartan  100 mg Oral Daily    meclizine  25 mg Oral TID    pantoprazole  40 mg Oral Daily    rosuvastatin  40 mg Oral QHS        PRN:  acetaminophen, atropine, melatonin, sodium chloride 0.9%    Antibiotics and Day Number of Therapy:  N/A    Lines and Day Number of Therapy:  PIV       Plan:     Syncope likely cardiogenic cause  - EKG with appearance of Mobitz type I with progressively lengthening TX interval, although difficult to discern any p wave before  dropped beat  - given known arrhythmia history and past syncopal events, likely this is cause  - O cardiology consulted from ED, appreciate recs: admit to ICU to monitor closely for arrhythmia, they will follow  - have ordered atropine prn, pacer pads placed, currently asymptomatic but will monitor closely  - initial trop negative however trended up to above reference range, will trend to peak/down. No Sx of chest pain so low suspicion for ACS   -Cardiology deferring inpatient pacer; Discharge with Holter and cards follow up     C7 spinal fracture, undetermined chronicity  - CT c spine with acute nondisplaced fx of anterior vertebral body C7  - MRI with fx possibly chronic  - continue c collar and c spine precautions   -Neurosurgery follow up at discharge     CLL with Leukocytosis  - chronic elevated WBC per pt; per chart review at baseline  - cont outpatient follow up      HTN  - cont home losartan  -stopped HCTZ     CKD II  - GFR and Cr at baseline per chart review  - Continue ARB, monitor      HLD  - cont home statin      GERD  - cont PPI      Osteoporosis  - on home alendronate      Mood Disorder  - will confirm if on lexapro       Diet: Cardiac  Ppx: SCDs, transition to SQH if no procedure today  Dispo: Discharge with neurosurgery and cardiology follow up    Jacek Washington  [facility]  [facility] IM Service Team B    \[facility]\"" Medicine Hospitalist Pager numbers:   [facility] Hospitalist Medicine Team A (Parker/Ngozi): [phone]  \[facility]\"" Hospitalist Medicine Team B (Ant/Mary):  [phone]

## 2020-02-24 NOTE — NURSING TRANSFER
Nursing Transfer Note      2/24/2020     Transfer To: 523    Transfer via bed    Transfer with cardiac monitoring    Transported by Li RN    Medicines sent: Meclizine    Chart send with patient: Yes    Notified: daughter    Patient reassessed at: 02/24/2020    Upon arrival to floor: cardiac monitor applied, patient oriented to room, call bell in reach and bed in lowest position

## 2020-02-25 NOTE — DISCHARGE SUMMARY
[facility] Internal Medicine Discharge Summary    Primary Team: [facility] Internal Medicine Team B  Attending Physician: Dr. Canales  Resident: Humberto  Intern: Felix    Date of Admit: 2/21/2020  Date of Discharge: 2/24/20    Discharge to: Home  Condition: Stable    Discharge Diagnoses     Patient Active Problem List   Diagnosis    Syncope and collapse    Essential hypertension    Hyperlipidemia    CLL (chronic lymphocytic leukemia)    First degree AV block    Left anterior hemiblock    Other chest pain    Second degree AV block    Orthostatic hypotension    Premature atrial contractions    History of PSVT (paroxysmal supraventricular tachycardia)    Major depression, recurrent, chronic    Gastroesophageal reflux disease without esophagitis    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    Dyspnea on exertion    Nonspecific abnormal electrocardiogram (ECG) (EKG)    PSVT (paroxysmal supraventricular tachycardia)    Syncope    Closed nondisplaced fracture of seventh cervical vertebra       Consultants and Procedures     Consultants:  [facility] Cardiology      Procedures:   N/A    Brief History of Present Illness      Za Limon is a 79 y.o. female who  has a past medical history of 2nd degree AV block (5/6/2016), CLL (chronic lymphocytic leukemia), Hyperlipidemia, and Hypertension.  The patient presented to [facility] on 2/21/2020 with a primary complaint of Loss of Consciousness (pt BIB EMS with reports of syncope at [facility]; pt c/o dizziness and fatigue, denies any cp, sob) and Headache    Patient says she was shopping in the grocery store and bent down to pick out something low down on the shelf. When she stood back up, she felt very dizzy and was holding on to her cart. She thinks she must have fallen backwards, but does not remember, next thing she remembers is being approached by a young lady asking if she was ok. She denies any other pain or injury except headache and neck pain  related to her c collar.     Of note, this has happened to her several times before and she has been counseled about a pacemaker with her outpatient care team (PCP, maria m) given her known ?2nd degree heart block. She has resisted pursuing pacemaker up until now. She denied any blood thinners except baby ASA.     She had workup in ED sig for CT c spine with C7 body fx. No ICH. She also endorsed some chest congestion and sinus congestion for past few weeks, has been taking OTC meds for this but cannot remember what. Notes she has night sweats most nights, attributes to CLL.        For the full HPI please refer to the History & Physical from this admission.    Hospital Course By Problem with Pertinent Findings     Syncope, possibly multifactorial vs cardiogenic cause  - EKG with appearance of Mobitz type I with progressively lengthening MI interval, although difficult to discern any p wave before dropped beat  - given known arrhythmia history and past syncopal events, cardiology consulted  - O cardiology on board, appreciated recs: patient monitored in ICU for 2 days with pacer pads and atropine prn, defer pacemaker placement as no evidence of malignant arrhythmia on telemetry during stay. Recommended outpatient holter monitor and close follow up  - patient also positive orthostatics that resolved with IVF, and nystagmus/vertigo improved with meclizine  - encouraged PO fluid intake at discharge and meclizine taper   - initial trop negative however trended up to above reference range then down again, no other signs/Sx to suggest ACS during the admission     C7 spinal fracture, undetermined chronicity  - CT c spine with acute nondisplaced fx of anterior vertebral body C7 on presentation to ED  - MRI with fx possibly chronic  - continued c collar and c spine precautions   - Neurosurgery follow up at discharge, contacted and they will arrange     CLL with Leukocytosis  - chronic elevated WBC per pt; per chart review at  baseline  - cont outpatient follow up      HTN  - cont home losartan  - stopped HCTZ (contributing to positive orthostatics as above)?     CKD II  - GFR and Cr at baseline per chart review  - Continue ARB, monitor      HLD  - cont home statin      GERD  - cont PPI      Osteoporosis  - on home alendronate      Mood Disorder  - continue follow up with PCP for lexapro       Discharge Medications        Medication List      START taking these medications    losartan 100 MG tablet  Commonly known as:  COZAAR  Take 1 tablet (100 mg total) by mouth once daily.     meclizine 25 mg tablet  Commonly known as:  ANTIVERT  Take 1 tablet (25 mg total) by mouth 3 (three) times daily. Take 3 times daily for 3 days, then nightly for 3 days, then as needed for 3 days        CONTINUE taking these medications    acetaminophen 500 MG tablet  Commonly known as:  TYLENOL     alendronate 70 MG tablet  Commonly known as:  FOSAMAX  TAKE 1 TABLET BY MOUTH ONE TIME PER WEEK     aspirin 81 MG EC tablet  Commonly known as:  ECOTRIN     diphenhydrAMINE 25 mg capsule  Commonly known as:  BENADRYL     escitalopram oxalate 10 MG tablet  Commonly known as:  LEXAPRO  Take 1 tablet (10 mg total) by mouth once daily.     omeprazole 20 MG capsule  Commonly known as:  PRILOSEC  TAKE 1 CAPSULE BY MOUTH EVERY DAY     ranitidine 150 MG tablet  Commonly known as:  ZANTAC  Take 1 tablet (150 mg total) by mouth 2 (two) times daily.     ROBITUSSIN-DM ORAL     rosuvastatin 40 MG Tab  Commonly known as:  CRESTOR  TAKE 1 TABLET BY MOUTH EVERY DAY IN THE EVENING        STOP taking these medications    losartan-hydrochlorothiazide 100-12.5 mg 100-12.5 mg Tab  Commonly known as:  HYZAAR           Where to Get Your Medications      These medications were sent to [facility] [address], [place] [address]    Hours:  Mon-Fri, 8a-5:30p Phone:  [phone]   · losartan 100 MG tablet  · meclizine 25 mg tablet         Discharge Information:    Diet:  Cardiac    Physical Activity:  As tolerated, maintain c collar until NSGY follow up    Instructions:  1. Take all medications as prescribed  2. Keep all follow-up appointments  3. Return to the hospital or call your primary care physicians if any worsening symptoms such as dizziness, syncope, chest pain, shortness of breath, or any other concerning symptoms occur.      Follow-Up Appointments:  Future Appointments   Date Time Provider Department Center   3/2/2020 10:00 AM [facility], HOLTER MONITORS [facility] CARD [facility]   3/9/2020  9:30 AM Stepan Blanco MD [facility] CARDIO [facility]   6/22/2020 10:00 AM Stepan Blanco MD [facility] CARDIO [facility]         [facility] Internal Medicine, -[facility]

## 2020-02-26 NOTE — TELEPHONE ENCOUNTER
Spoke with Mrs. Limon, she request I give her daugher- Keshia a call to confirm appointment. Patient states she doesn't  have a ride.     Appointment confirmed with Keshia - pt daughter.

## 2020-02-27 ENCOUNTER — HOSPITAL ENCOUNTER (OUTPATIENT)
Dept: RADIOLOGY | Facility: HOSPITAL | Age: 80
Discharge: HOME | End: 2020-02-27
Attending: PHYSICIAN ASSISTANT
Payer: MEDICARE

## 2020-02-27 ENCOUNTER — OFFICE VISIT (OUTPATIENT)
Dept: ORTHOPEDICS | Facility: CLINIC | Age: 80
End: 2020-02-27
Payer: MEDICARE

## 2020-02-27 ENCOUNTER — OFFICE VISIT (OUTPATIENT)
Dept: NEUROSURGERY | Facility: CLINIC | Age: 80
End: 2020-02-27
Payer: MEDICARE

## 2020-02-27 ENCOUNTER — TELEPHONE (OUTPATIENT)
Dept: ORTHOPEDICS | Facility: CLINIC | Age: 80
End: 2020-02-27

## 2020-02-27 ENCOUNTER — TELEPHONE (OUTPATIENT)
Dept: CARDIOLOGY | Facility: CLINIC | Age: 80
End: 2020-02-27

## 2020-02-27 VITALS
HEIGHT: 66 IN | BODY MASS INDEX: 26.36 KG/M2 | WEIGHT: 164 LBS | DIASTOLIC BLOOD PRESSURE: 53 MMHG | HEART RATE: 61 BPM | SYSTOLIC BLOOD PRESSURE: 108 MMHG

## 2020-02-27 VITALS
BODY MASS INDEX: 26.36 KG/M2 | HEIGHT: 66 IN | WEIGHT: 164 LBS | SYSTOLIC BLOOD PRESSURE: 108 MMHG | DIASTOLIC BLOOD PRESSURE: 53 MMHG | HEART RATE: 61 BPM

## 2020-02-27 DIAGNOSIS — M25.562 LEFT KNEE PAIN, UNSPECIFIED CHRONICITY: ICD-10-CM

## 2020-02-27 DIAGNOSIS — M17.0 BILATERAL PRIMARY OSTEOARTHRITIS OF KNEE: Primary | ICD-10-CM

## 2020-02-27 DIAGNOSIS — S12.601D CLOSED NONDISPLACED FRACTURE OF SEVENTH CERVICAL VERTEBRA WITH ROUTINE HEALING, UNSPECIFIED FRACTURE MORPHOLOGY, SUBSEQUENT ENCOUNTER: ICD-10-CM

## 2020-02-27 DIAGNOSIS — S12.9XXA CLOSED FRACTURE OF CERVICAL VERTEBRA, UNSPECIFIED CERVICAL VERTEBRAL LEVEL, INITIAL ENCOUNTER: ICD-10-CM

## 2020-02-27 DIAGNOSIS — M25.562 LEFT KNEE PAIN, UNSPECIFIED CHRONICITY: Primary | ICD-10-CM

## 2020-02-27 PROCEDURE — 1159F MED LIST DOCD IN RCRD: CPT | Mod: S$GLB,,, | Performed by: PHYSICIAN ASSISTANT

## 2020-02-27 PROCEDURE — 1125F PR PAIN SEVERITY QUANTIFIED, PAIN PRESENT: ICD-10-PCS | Mod: S$GLB,,, | Performed by: PHYSICIAN ASSISTANT

## 2020-02-27 PROCEDURE — 3074F PR MOST RECENT SYSTOLIC BLOOD PRESSURE < 130 MM HG: ICD-10-PCS | Mod: CPTII,S$GLB,, | Performed by: PHYSICIAN ASSISTANT

## 2020-02-27 PROCEDURE — 3074F SYST BP LT 130 MM HG: CPT | Mod: CPTII,S$GLB,, | Performed by: PHYSICIAN ASSISTANT

## 2020-02-27 PROCEDURE — 3288F FALL RISK ASSESSMENT DOCD: CPT | Mod: CPTII,S$GLB,, | Performed by: PHYSICIAN ASSISTANT

## 2020-02-27 PROCEDURE — 3078F DIAST BP <80 MM HG: CPT | Mod: CPTII,S$GLB,, | Performed by: PHYSICIAN ASSISTANT

## 2020-02-27 PROCEDURE — 99202 OFFICE O/P NEW SF 15 MIN: CPT | Mod: 25,S$GLB,, | Performed by: PHYSICIAN ASSISTANT

## 2020-02-27 PROCEDURE — 20610 PR DRAIN/INJECT LARGE JOINT/BURSA: ICD-10-PCS | Mod: 50,S$GLB,, | Performed by: PHYSICIAN ASSISTANT

## 2020-02-27 PROCEDURE — 99999 PR PBB SHADOW E&M-EST. PATIENT-LVL IV: CPT | Mod: PBBFAC,,, | Performed by: PHYSICIAN ASSISTANT

## 2020-02-27 PROCEDURE — 1100F PR PT FALLS ASSESS DOC 2+ FALLS/FALL W/INJURY/YR: ICD-10-PCS | Mod: CPTII,S$GLB,, | Performed by: PHYSICIAN ASSISTANT

## 2020-02-27 PROCEDURE — 99999 PR PBB SHADOW E&M-EST. PATIENT-LVL III: CPT | Mod: PBBFAC,,, | Performed by: PHYSICIAN ASSISTANT

## 2020-02-27 PROCEDURE — 1125F AMNT PAIN NOTED PAIN PRSNT: CPT | Mod: S$GLB,,, | Performed by: PHYSICIAN ASSISTANT

## 2020-02-27 PROCEDURE — 73560 XR KNEE 1 OR 2 VIEW BILATERAL: ICD-10-PCS | Mod: 26,50,, | Performed by: RADIOLOGY

## 2020-02-27 PROCEDURE — 72050 X-RAY EXAM NECK SPINE 4/5VWS: CPT | Mod: TC,PN

## 2020-02-27 PROCEDURE — 72050 XR CERVICAL SPINE AP LAT WITH FLEX EXTEN: ICD-10-PCS | Mod: 26,,, | Performed by: RADIOLOGY

## 2020-02-27 PROCEDURE — 99204 PR OFFICE/OUTPT VISIT, NEW, LEVL IV, 45-59 MIN: ICD-10-PCS | Mod: S$GLB,,, | Performed by: PHYSICIAN ASSISTANT

## 2020-02-27 PROCEDURE — 99202 PR OFFICE/OUTPT VISIT, NEW, LEVL II, 15-29 MIN: ICD-10-PCS | Mod: 25,S$GLB,, | Performed by: PHYSICIAN ASSISTANT

## 2020-02-27 PROCEDURE — 73560 X-RAY EXAM OF KNEE 1 OR 2: CPT | Mod: 26,50,, | Performed by: RADIOLOGY

## 2020-02-27 PROCEDURE — 1159F PR MEDICATION LIST DOCUMENTED IN MEDICAL RECORD: ICD-10-PCS | Mod: S$GLB,,, | Performed by: PHYSICIAN ASSISTANT

## 2020-02-27 PROCEDURE — 1100F PTFALLS ASSESS-DOCD GE2>/YR: CPT | Mod: CPTII,S$GLB,, | Performed by: PHYSICIAN ASSISTANT

## 2020-02-27 PROCEDURE — 99204 OFFICE O/P NEW MOD 45 MIN: CPT | Mod: S$GLB,,, | Performed by: PHYSICIAN ASSISTANT

## 2020-02-27 PROCEDURE — 3288F PR FALLS RISK ASSESSMENT DOCUMENTED: ICD-10-PCS | Mod: CPTII,S$GLB,, | Performed by: PHYSICIAN ASSISTANT

## 2020-02-27 PROCEDURE — 3078F PR MOST RECENT DIASTOLIC BLOOD PRESSURE < 80 MM HG: ICD-10-PCS | Mod: CPTII,S$GLB,, | Performed by: PHYSICIAN ASSISTANT

## 2020-02-27 PROCEDURE — 72050 X-RAY EXAM NECK SPINE 4/5VWS: CPT | Mod: 26,,, | Performed by: RADIOLOGY

## 2020-02-27 PROCEDURE — 73560 X-RAY EXAM OF KNEE 1 OR 2: CPT | Mod: TC,50,PN

## 2020-02-27 PROCEDURE — 20610 DRAIN/INJ JOINT/BURSA W/O US: CPT | Mod: 50,S$GLB,, | Performed by: PHYSICIAN ASSISTANT

## 2020-02-27 PROCEDURE — 99999 PR PBB SHADOW E&M-EST. PATIENT-LVL IV: ICD-10-PCS | Mod: PBBFAC,,, | Performed by: PHYSICIAN ASSISTANT

## 2020-02-27 PROCEDURE — 99999 PR PBB SHADOW E&M-EST. PATIENT-LVL III: ICD-10-PCS | Mod: PBBFAC,,, | Performed by: PHYSICIAN ASSISTANT

## 2020-02-27 RX ORDER — TRIAMCINOLONE ACETONIDE 40 MG/ML
40 INJECTION, SUSPENSION INTRA-ARTICULAR; INTRAMUSCULAR
Status: DISCONTINUED | OUTPATIENT
Start: 2020-02-27 | End: 2020-02-27 | Stop reason: HOSPADM

## 2020-02-27 RX ORDER — DICLOFENAC SODIUM 10 MG/G
2 GEL TOPICAL 2 TIMES DAILY
Qty: 1 TUBE | Refills: 2 | Status: SHIPPED | OUTPATIENT
Start: 2020-02-27 | End: 2023-01-30

## 2020-02-27 RX ADMIN — TRIAMCINOLONE ACETONIDE 40 MG: 40 INJECTION, SUSPENSION INTRA-ARTICULAR; INTRAMUSCULAR at 10:02

## 2020-02-27 NOTE — LETTER
February 27, 2020      Luis Butts PA-C  [address]  [address]  [facility] [address]           [facility]  [address]  [facility] [address]  Phone: [phone]          Patient: Za Limon   MR Number: [id number]   YOB: 1940   Date of Visit: 2/27/2020       Dear Luis Butts:    Thank you for referring Za Limon to me for evaluation. Attached you will find relevant portions of my assessment and plan of care.    If you have questions, please do not hesitate to call me. I look forward to following Za Limon along with you.    Sincerely,    Miguelito Browning PA-C    Enclosure  CC:  No Recipients    If you would like to receive this communication electronically, please contact [email or web address] or [phone] to request more information on [employer] access.    For providers and/or their staff who would like to refer a patient to [facility], please contact us through our one-stop-shop provider referral line, [facility], at [phone].    If you feel you have received this communication in error or would no longer like to receive these types of communications, please e-mail [email or web address]

## 2020-02-27 NOTE — TELEPHONE ENCOUNTER
----- Message from Kunal Mendez sent at 2/27/2020 11:35 AM CST -----  Contact: daughter [phone]  Patient's daughter is calling about medication that was supposed to be called in. They are at the pharmacy and wants to know if meds can be called in while they are there. Please call and advise.

## 2020-02-27 NOTE — PROGRESS NOTES
Subjective:      Patient ID: Za Limon is a 79 y.o. female.    Chief Complaint: Pain of the Left Knee; Pain of the Right Knee; Consult; and Knee Pain (bilateral )      HPI: Za Limon is an 79-year-old female in clinic today for bilateral knee pain.  Patient states that she has had pain in both knees for greater than 5 years and it has recently worsened.  She states that pain is worse on the left side which was worsened by a fall about a week ago.  She has not tried any treatment to date.  She states that the pain is worse standing from a seated position and after walking for long periods of time.    Past Medical History:   Diagnosis Date    2nd degree AV block 5/6/2016    CLL (chronic lymphocytic leukemia)     Hyperlipidemia     Hypertension        Current Outpatient Medications:     acetaminophen (TYLENOL) 500 MG tablet, Take 500 mg by mouth every 6 (six) hours as needed for Pain., Disp: , Rfl:     alendronate (FOSAMAX) 70 MG tablet, TAKE 1 TABLET BY MOUTH ONE TIME PER WEEK, Disp: 12 tablet, Rfl: 1    aspirin (ECOTRIN) 81 MG EC tablet, Take 81 mg by mouth once daily., Disp: , Rfl:     diphenhydrAMINE (BENADRYL) 25 mg capsule, Take 25 mg by mouth nightly as needed for Itching., Disp: , Rfl:     escitalopram oxalate (LEXAPRO) 10 MG tablet, Take 1 tablet (10 mg total) by mouth once daily., Disp: 90 tablet, Rfl: 3    losartan (COZAAR) 100 MG tablet, Take 1 tablet (100 mg total) by mouth once daily., Disp: 90 tablet, Rfl: 3    omeprazole (PRILOSEC) 20 MG capsule, TAKE 1 CAPSULE BY MOUTH EVERY DAY, Disp: 90 capsule, Rfl: 3    ranitidine (ZANTAC) 150 MG tablet, Take 1 tablet (150 mg total) by mouth 2 (two) times daily., Disp: 60 tablet, Rfl: 11    rosuvastatin (CRESTOR) 40 MG Tab, TAKE 1 TABLET BY MOUTH EVERY DAY IN THE EVENING, Disp: 90 tablet, Rfl: 3    diclofenac sodium (VOLTAREN) 1 % Gel, Apply 2 g topically 2 (two) times daily., Disp: 1 Tube, Rfl: 2    guaifenesin/dextromethorphan  "(ROBITUSSIN-DM ORAL), Take by mouth every evening., Disp: , Rfl:     meclizine (ANTIVERT) 25 mg tablet, Take 1 tablet (25 mg total) by mouth 3 (three) times daily. Take 3 times daily for 3 days, then nightly for 3 days, then as needed for 3 days (Patient not taking: Reported on 2/27/2020), Disp: 30 tablet, Rfl: 0  Review of patient's allergies indicates:   Allergen Reactions    Codeine        BP (!) 108/53   Pulse 61   Ht 5' 6" (1.676 m)   Wt 74.4 kg (164 lb)   BMI 26.47 kg/m²     Review of Systems   Constitution: Negative for chills and fever.   HENT: Negative for congestion and hoarse voice.    Eyes: Negative for discharge and redness.   Cardiovascular: Negative for chest pain and palpitations.   Respiratory: Negative for cough and wheezing.    Skin: Negative for itching and rash.   Musculoskeletal: Positive for arthritis, joint pain, joint swelling and stiffness.   Gastrointestinal: Negative for diarrhea, nausea and vomiting.   Neurological: Negative for dizziness and numbness.   Psychiatric/Behavioral: Negative for altered mental status.   Allergic/Immunologic: Negative for environmental allergies and persistent infections.           Objective:    Ortho Exam   Left knee:  Mild TTP over the medial joint line  Flexion-100°  Extension-0°  Medial laxity is noted  Sensation and pulses intact    Right knee is identical on exam    GEN: Well developed, well nourished female. AAOX3. No acute distress.   Normocephalic, atraumatic.   SERGEY  Breathing unlabored.  Mood and affect appropriate.         Assessment:     Imaging: Xray bilateral knees shows moderate osteoarthritis        1. Bilateral primary osteoarthritis of knee          Plan:       I would like to try corticosteroid injections today in both knees, patient agrees  I also recommended a topical anti-inflammatory to this patient  I showed the patient her x-rays and explained explained to her that she would potentially be a candidate for joint replacement with " her current radiographs and clinical picture, but we should try to delay that as long as possible with conservative treatment if it is effective in treating her symptoms, the patient understands and agrees    Orders Placed This Encounter    Large Joint Aspiration/Injection    diclofenac sodium (VOLTAREN) 1 % Gel     Follow up in about 6 weeks (around 4/9/2020).

## 2020-02-27 NOTE — PROCEDURES
Large Joint Aspiration/Injection  Performed by: Miguelito Browning PA-C  Authorized by: Miguelito Browning PA-C  Date/Time: 2/27/2020 10:00 AM    Medications: 40 mg triamcinolone acetonide 40 mg/mL; 40 mg triamcinolone acetonide 40 mg/mL    PROCEDURE:  I have explained the risks, benefits, and alternatives of the procedure in detail.  The patient voices understanding and all questions have been answered.  The patient agrees to proceed as planned. So after I performed a sterile prep of the skin in the normal fashion the bilateral knee is injected using a 21 gauge needle with a combination of 1cc 1% plain xylocaine and 40mg triamcinolone.  The patient is cautioned that immediate relief of pain is secondary to the local anesthetic and will be temporary. After the anesthetic wears off there may be a increase in pain that may last for a few hours or a few days and they should use ice to help alleviate this this pain. Patient tolerated the procedure well.

## 2020-02-29 PROCEDURE — G0180 MD CERTIFICATION HHA PATIENT: HCPCS | Mod: ,,, | Performed by: FAMILY MEDICINE

## 2020-02-29 PROCEDURE — G0180 PR HOME HEALTH MD CERTIFICATION: ICD-10-PCS | Mod: ,,, | Performed by: FAMILY MEDICINE

## 2020-03-02 ENCOUNTER — HOSPITAL ENCOUNTER (OUTPATIENT)
Dept: CARDIOLOGY | Facility: HOSPITAL | Age: 80
Discharge: HOME | End: 2020-03-02
Attending: INTERNAL MEDICINE
Payer: MEDICARE

## 2020-03-02 DIAGNOSIS — Z86.79 HISTORY OF PSVT (PAROXYSMAL SUPRAVENTRICULAR TACHYCARDIA): ICD-10-CM

## 2020-03-02 DIAGNOSIS — I44.1 2ND DEGREE AV BLOCK: ICD-10-CM

## 2020-03-02 DIAGNOSIS — I44.0 FIRST DEGREE AV BLOCK: ICD-10-CM

## 2020-03-02 DIAGNOSIS — I10 ESSENTIAL HYPERTENSION: ICD-10-CM

## 2020-03-02 DIAGNOSIS — I49.1 PREMATURE ATRIAL CONTRACTIONS: ICD-10-CM

## 2020-03-02 DIAGNOSIS — I44.4 LEFT ANTERIOR HEMIBLOCK: ICD-10-CM

## 2020-03-02 PROCEDURE — 93225 XTRNL ECG REC<48 HRS REC: CPT

## 2020-03-02 PROCEDURE — 93227 HOLTER MONITOR - 24 HOUR (CUPID ONLY): ICD-10-PCS | Mod: ,,, | Performed by: INTERNAL MEDICINE

## 2020-03-02 PROCEDURE — 93227 XTRNL ECG REC<48 HR R&I: CPT | Mod: ,,, | Performed by: INTERNAL MEDICINE

## 2020-03-05 ENCOUNTER — TELEPHONE (OUTPATIENT)
Dept: CARDIOLOGY | Facility: CLINIC | Age: 80
End: 2020-03-05

## 2020-03-05 LAB
OHS CV EVENT MONITOR DAY: 0
OHS CV HOLTER LENGTH DECIMAL HOURS: 24
OHS CV HOLTER LENGTH HOURS: 24
OHS CV HOLTER LENGTH MINUTES: 0

## 2020-03-09 ENCOUNTER — OFFICE VISIT (OUTPATIENT)
Dept: CARDIOLOGY | Facility: CLINIC | Age: 80
End: 2020-03-09
Payer: MEDICARE

## 2020-03-09 VITALS
SYSTOLIC BLOOD PRESSURE: 136 MMHG | WEIGHT: 171.94 LBS | HEIGHT: 66 IN | DIASTOLIC BLOOD PRESSURE: 63 MMHG | HEART RATE: 56 BPM | BODY MASS INDEX: 27.63 KG/M2

## 2020-03-09 DIAGNOSIS — I95.1 ORTHOSTATIC HYPOTENSION: ICD-10-CM

## 2020-03-09 DIAGNOSIS — R55 SYNCOPE AND COLLAPSE: Primary | ICD-10-CM

## 2020-03-09 DIAGNOSIS — Z86.79 HISTORY OF PSVT (PAROXYSMAL SUPRAVENTRICULAR TACHYCARDIA): ICD-10-CM

## 2020-03-09 DIAGNOSIS — I44.1 SECOND DEGREE AV BLOCK: ICD-10-CM

## 2020-03-09 DIAGNOSIS — I10 ESSENTIAL HYPERTENSION: ICD-10-CM

## 2020-03-09 DIAGNOSIS — R07.89 OTHER CHEST PAIN: ICD-10-CM

## 2020-03-09 DIAGNOSIS — C91.10 CLL (CHRONIC LYMPHOCYTIC LEUKEMIA): ICD-10-CM

## 2020-03-09 DIAGNOSIS — I44.4 LEFT ANTERIOR HEMIBLOCK: ICD-10-CM

## 2020-03-09 DIAGNOSIS — I47.10 PSVT (PAROXYSMAL SUPRAVENTRICULAR TACHYCARDIA): ICD-10-CM

## 2020-03-09 DIAGNOSIS — I49.3 PVC (PREMATURE VENTRICULAR CONTRACTION): ICD-10-CM

## 2020-03-09 DIAGNOSIS — I49.1 PREMATURE ATRIAL CONTRACTIONS: ICD-10-CM

## 2020-03-09 DIAGNOSIS — E78.00 PURE HYPERCHOLESTEROLEMIA: ICD-10-CM

## 2020-03-09 DIAGNOSIS — I44.0 FIRST DEGREE AV BLOCK: ICD-10-CM

## 2020-03-09 PROCEDURE — 99214 OFFICE O/P EST MOD 30 MIN: CPT | Mod: S$GLB,,, | Performed by: INTERNAL MEDICINE

## 2020-03-09 PROCEDURE — 1100F PTFALLS ASSESS-DOCD GE2>/YR: CPT | Mod: CPTII,S$GLB,, | Performed by: INTERNAL MEDICINE

## 2020-03-09 PROCEDURE — 3288F PR FALLS RISK ASSESSMENT DOCUMENTED: ICD-10-PCS | Mod: CPTII,S$GLB,, | Performed by: INTERNAL MEDICINE

## 2020-03-09 PROCEDURE — 99999 PR PBB SHADOW E&M-EST. PATIENT-LVL III: ICD-10-PCS | Mod: PBBFAC,,, | Performed by: INTERNAL MEDICINE

## 2020-03-09 PROCEDURE — 1100F PR PT FALLS ASSESS DOC 2+ FALLS/FALL W/INJURY/YR: ICD-10-PCS | Mod: CPTII,S$GLB,, | Performed by: INTERNAL MEDICINE

## 2020-03-09 PROCEDURE — 1159F PR MEDICATION LIST DOCUMENTED IN MEDICAL RECORD: ICD-10-PCS | Mod: S$GLB,,, | Performed by: INTERNAL MEDICINE

## 2020-03-09 PROCEDURE — 1159F MED LIST DOCD IN RCRD: CPT | Mod: S$GLB,,, | Performed by: INTERNAL MEDICINE

## 2020-03-09 PROCEDURE — 3078F DIAST BP <80 MM HG: CPT | Mod: CPTII,S$GLB,, | Performed by: INTERNAL MEDICINE

## 2020-03-09 PROCEDURE — 3078F PR MOST RECENT DIASTOLIC BLOOD PRESSURE < 80 MM HG: ICD-10-PCS | Mod: CPTII,S$GLB,, | Performed by: INTERNAL MEDICINE

## 2020-03-09 PROCEDURE — 99214 PR OFFICE/OUTPT VISIT, EST, LEVL IV, 30-39 MIN: ICD-10-PCS | Mod: S$GLB,,, | Performed by: INTERNAL MEDICINE

## 2020-03-09 PROCEDURE — 99499 RISK ADDL DX/OHS AUDIT: ICD-10-PCS | Mod: S$GLB,,, | Performed by: INTERNAL MEDICINE

## 2020-03-09 PROCEDURE — 3288F FALL RISK ASSESSMENT DOCD: CPT | Mod: CPTII,S$GLB,, | Performed by: INTERNAL MEDICINE

## 2020-03-09 PROCEDURE — 99499 UNLISTED E&M SERVICE: CPT | Mod: S$GLB,,, | Performed by: INTERNAL MEDICINE

## 2020-03-09 PROCEDURE — 99999 PR PBB SHADOW E&M-EST. PATIENT-LVL III: CPT | Mod: PBBFAC,,, | Performed by: INTERNAL MEDICINE

## 2020-03-09 PROCEDURE — 3075F SYST BP GE 130 - 139MM HG: CPT | Mod: CPTII,S$GLB,, | Performed by: INTERNAL MEDICINE

## 2020-03-09 PROCEDURE — 3075F PR MOST RECENT SYSTOLIC BLOOD PRESS GE 130-139MM HG: ICD-10-PCS | Mod: CPTII,S$GLB,, | Performed by: INTERNAL MEDICINE

## 2020-03-09 RX ORDER — LOSARTAN POTASSIUM 100 MG/1
100 TABLET ORAL DAILY
Qty: 90 TABLET | Refills: 3 | Status: SHIPPED | OUTPATIENT
Start: 2020-03-09 | End: 2021-02-22 | Stop reason: SDUPTHER

## 2020-03-09 NOTE — PROGRESS NOTES
Subjective:      Patient ID: Za Limon is a 79 y.o. female.    Chief Complaint: Hospital Follow Up (Recent hosp admit for syncopal episode.)    HPI:  Pt fainted and hit the back of her head after bending over and then standing up in the store.      Pt was hospitalized for 4 days    The HCTZ was discontinued due to concern that pt had fainted due to orthostatic hypotension.    Pt last experienced mild anterior chest discomfort a couple of weeks ago which lasted a minute or two while laying down.    Pt feels a little weak.    Pt is starting to get her strength back.    Pt walks on treadmill.    Pt cleans house.    Knees hurt on treadmill     Back hurts when on the floor to do exercises.    Review of Systems   Cardiovascular: Positive for chest pain and syncope. Negative for claudication, dyspnea on exertion, irregular heartbeat, leg swelling, near-syncope, orthopnea and palpitations.      There is no hx of chest pain or shortness of breath with exertion.    Past Medical History:   Diagnosis Date    2nd degree AV block 5/6/2016    CLL (chronic lymphocytic leukemia)     Hyperlipidemia     Hypertension         Past Surgical History:   Procedure Laterality Date    HYSTERECTOMY      left leg laser surgery         Family History   Problem Relation Age of Onset    Stroke Father     Heart disease Sister     Heart disease Brother        Social History     Socioeconomic History    Marital status:      Spouse name: Not on file    Number of children: Not on file    Years of education: Not on file    Highest education level: Not on file   Occupational History    Not on file   Social Needs    Financial resource strain: Not on file    Food insecurity:     Worry: Not on file     Inability: Not on file    Transportation needs:     Medical: Not on file     Non-medical: Not on file   Tobacco Use    Smoking status: Never Smoker    Smokeless tobacco: Never Used   Substance and Sexual Activity    Alcohol  use: Not on file    Drug use: Not on file    Sexual activity: Not Currently   Lifestyle    Physical activity:     Days per week: Not on file     Minutes per session: Not on file    Stress: Not on file   Relationships    Social connections:     Talks on phone: Not on file     Gets together: Not on file     Attends Hindu service: Not on file     Active member of club or organization: Not on file     Attends meetings of clubs or organizations: Not on file     Relationship status: Not on file   Other Topics Concern    Not on file   Social History Narrative    Not on file       Current Outpatient Medications on File Prior to Visit   Medication Sig Dispense Refill    acetaminophen (TYLENOL) 500 MG tablet Take 500 mg by mouth every 6 (six) hours as needed for Pain.      alendronate (FOSAMAX) 70 MG tablet TAKE 1 TABLET BY MOUTH ONE TIME PER WEEK 12 tablet 1    aspirin (ECOTRIN) 81 MG EC tablet Take 81 mg by mouth once daily.      diclofenac sodium (VOLTAREN) 1 % Gel Apply 2 g topically 2 (two) times daily. 1 Tube 2    diphenhydrAMINE (BENADRYL) 25 mg capsule Take 25 mg by mouth nightly as needed for Itching.      escitalopram oxalate (LEXAPRO) 10 MG tablet Take 1 tablet (10 mg total) by mouth once daily. 90 tablet 3    guaifenesin/dextromethorphan (ROBITUSSIN-DM ORAL) Take by mouth every evening.      meclizine (ANTIVERT) 25 mg tablet Take 1 tablet (25 mg total) by mouth 3 (three) times daily. Take 3 times daily for 3 days, then nightly for 3 days, then as needed for 3 days 30 tablet 0    omeprazole (PRILOSEC) 20 MG capsule TAKE 1 CAPSULE BY MOUTH EVERY DAY 90 capsule 3    rosuvastatin (CRESTOR) 40 MG Tab TAKE 1 TABLET BY MOUTH EVERY DAY IN THE EVENING 90 tablet 3    [DISCONTINUED] losartan (COZAAR) 100 MG tablet Take 1 tablet (100 mg total) by mouth once daily. 90 tablet 3    [DISCONTINUED] ranitidine (ZANTAC) 150 MG tablet Take 1 tablet (150 mg total) by mouth 2 (two) times daily. 60 tablet 11  "    No current facility-administered medications on file prior to visit.        Review of patient's allergies indicates:   Allergen Reactions    Codeine      Objective:     Vitals:    03/09/20 1016   BP: 136/63   BP Location: Left arm   Patient Position: Sitting   BP Method: Large (Automatic)   Pulse: (!) 56   Weight: 78 kg (171 lb 15.3 oz)   Height: 5' 6" (1.676 m)        Physical Exam   Constitutional: She is oriented to person, place, and time. She appears well-developed and well-nourished.   Eyes: No scleral icterus.   Neck: No JVD present. Carotid bruit is not present.   Cardiovascular: Normal rate and regular rhythm.  Occasional extrasystoles are present. Exam reveals no gallop.   Murmur (I/VI systolic) heard.  Pulmonary/Chest: Breath sounds normal.   Musculoskeletal: She exhibits no edema.   Neurological: She is alert and oriented to person, place, and time.   Skin: Skin is warm and dry.   Psychiatric: She has a normal mood and affect. Her behavior is normal. Judgment and thought content normal.   Vitals reviewed.     Hospital records reviewed    Holter last week showed very frequent PAC's including couplets and triplets and rare PVC's    Hospital labs reviewed Hgb 9.9 WBC 20    CMP WNL except low albumin  Assessment:     1. Syncope and collapse    2. Other chest pain    3. CLL (chronic lymphocytic leukemia)    4. Essential hypertension    5. First degree AV block    6. History of PSVT (paroxysmal supraventricular tachycardia)    7. Pure hypercholesterolemia    8. Left anterior hemiblock    9. Orthostatic hypotension    10. Premature atrial contractions    11. PSVT (paroxysmal supraventricular tachycardia)    12. Second degree AV block    13. PVC (premature ventricular contraction)      Plan:   Za was seen today for hospital follow up.    Diagnoses and all orders for this visit:    Syncope and collapse    Other chest pain    CLL (chronic lymphocytic leukemia)    Essential hypertension    First degree AV " block    History of PSVT (paroxysmal supraventricular tachycardia)    Pure hypercholesterolemia    Left anterior hemiblock    Orthostatic hypotension    Premature atrial contractions    PSVT (paroxysmal supraventricular tachycardia)    Second degree AV block    PVC (premature ventricular contraction)    Other orders  -     losartan (COZAAR) 100 MG tablet; Take 1 tablet (100 mg total) by mouth once daily.     Recent syncope due to orthostatic hypotension.    No recurrent syncope since stopping the HCTZ    PAC's do not require specific treatment    There is no evidence for erci or tachy-arrhythmia to explain syncope    Hypertension is well controlled off HCTZ    Same meds    F/u with Dr Iqbal for CLL    F/u with Dr Solorzano    RTC 4 months    increase protein in diet    Follow up in about 4 months (around 7/9/2020).

## 2020-04-02 ENCOUNTER — EXTERNAL HOME HEALTH (OUTPATIENT)
Dept: HOME HEALTH SERVICES | Facility: HOSPITAL | Age: 80
End: 2020-04-02
Payer: MEDICARE

## 2020-04-22 DIAGNOSIS — F33.9 MAJOR DEPRESSION, RECURRENT, CHRONIC: ICD-10-CM

## 2020-04-22 RX ORDER — ESCITALOPRAM OXALATE 10 MG/1
TABLET ORAL
Qty: 90 TABLET | Refills: 3 | Status: SHIPPED | OUTPATIENT
Start: 2020-04-22 | End: 2021-05-26 | Stop reason: SDUPTHER

## 2020-07-15 DIAGNOSIS — K21.9 GASTROESOPHAGEAL REFLUX DISEASE WITHOUT ESOPHAGITIS: ICD-10-CM

## 2020-07-15 RX ORDER — OMEPRAZOLE 20 MG/1
20 CAPSULE, DELAYED RELEASE ORAL DAILY
Qty: 90 CAPSULE | Refills: 3 | Status: SHIPPED | OUTPATIENT
Start: 2020-07-15 | End: 2021-10-03

## 2020-07-15 NOTE — TELEPHONE ENCOUNTER
----- Message from Deirdre Henry sent at 7/15/2020 11:55 AM CDT -----  Contact: [phone]  Type:  RX Refill Request    Who Called:  PT  Refill or New Rx: Refill  RX Name and Strength: omeprazole (PRILOSEC) 20 MG capsule  How is the patient currently taking it? (ex. 1XDay): once a day  Is this a 30 day or 90 day RX: 90 day  Preferred Pharmacy with phone number:[id number]  [address] - [phone]  Local or Mail Order: Local  Ordering Provider: Dr Solorzano  Would the patient rather a call back or a response via [facility]?  Call back  Call back Number: [phone]

## 2020-10-05 ENCOUNTER — PATIENT MESSAGE (OUTPATIENT)
Dept: ADMINISTRATIVE | Facility: HOSPITAL | Age: 80
End: 2020-10-05

## 2020-11-16 ENCOUNTER — TELEPHONE (OUTPATIENT)
Dept: FAMILY MEDICINE | Facility: CLINIC | Age: 80
End: 2020-11-16

## 2020-11-16 NOTE — TELEPHONE ENCOUNTER
----- Message from Stuart Williamson sent at 11/16/2020  3:01 PM CST -----  Type:  Needs Medical Advice    Who Called: Keshia/daughter  Symptoms (please be specific): headaches, bodyaches, back pain, fatigued  How long has patient had these symptoms: about two weeks    Would the patient rather a call back or a response via [facility]?call  Best Call Back Number: [phone]  Additional Information: They are requesting an appt first thing tomorrow morning.

## 2020-11-16 NOTE — TELEPHONE ENCOUNTER
Spoke to pt's daughter and states that she has headaches, bodyaches, back pain and fatigued. Daughter was advised to bring her mother to the ER or Urgent Care.

## 2020-12-21 ENCOUNTER — OFFICE VISIT (OUTPATIENT)
Dept: INTERNAL MEDICINE | Facility: CLINIC | Age: 80
End: 2020-12-21
Payer: MEDICARE

## 2020-12-21 ENCOUNTER — HOSPITAL ENCOUNTER (OUTPATIENT)
Dept: RADIOLOGY | Facility: HOSPITAL | Age: 80
Discharge: HOME | End: 2020-12-21
Attending: INTERNAL MEDICINE
Payer: MEDICARE

## 2020-12-21 ENCOUNTER — TELEPHONE (OUTPATIENT)
Dept: FAMILY MEDICINE | Facility: CLINIC | Age: 80
End: 2020-12-21

## 2020-12-21 VITALS
OXYGEN SATURATION: 97 % | HEIGHT: 66 IN | TEMPERATURE: 97 F | HEART RATE: 58 BPM | BODY MASS INDEX: 26.93 KG/M2 | SYSTOLIC BLOOD PRESSURE: 117 MMHG | RESPIRATION RATE: 16 BRPM | DIASTOLIC BLOOD PRESSURE: 58 MMHG | WEIGHT: 167.56 LBS

## 2020-12-21 DIAGNOSIS — C91.10 CLL (CHRONIC LYMPHOCYTIC LEUKEMIA): ICD-10-CM

## 2020-12-21 DIAGNOSIS — R05.9 COUGH: ICD-10-CM

## 2020-12-21 DIAGNOSIS — I49.9 CARDIAC ARRHYTHMIA, UNSPECIFIED CARDIAC ARRHYTHMIA TYPE: Primary | ICD-10-CM

## 2020-12-21 DIAGNOSIS — I47.10 PSVT (PAROXYSMAL SUPRAVENTRICULAR TACHYCARDIA): ICD-10-CM

## 2020-12-21 DIAGNOSIS — R53.83 FATIGUE, UNSPECIFIED TYPE: ICD-10-CM

## 2020-12-21 LAB
INFLUENZA A, MOLECULAR: NEGATIVE
INFLUENZA B, MOLECULAR: NEGATIVE
SPECIMEN SOURCE: NORMAL

## 2020-12-21 PROCEDURE — 99499 RISK ADDL DX/OHS AUDIT: ICD-10-PCS | Mod: S$GLB,,, | Performed by: INTERNAL MEDICINE

## 2020-12-21 PROCEDURE — 71046 X-RAY EXAM CHEST 2 VIEWS: CPT | Mod: 26,,, | Performed by: RADIOLOGY

## 2020-12-21 PROCEDURE — 99214 OFFICE O/P EST MOD 30 MIN: CPT | Mod: S$GLB,,, | Performed by: INTERNAL MEDICINE

## 2020-12-21 PROCEDURE — 1159F PR MEDICATION LIST DOCUMENTED IN MEDICAL RECORD: ICD-10-PCS | Mod: S$GLB,,, | Performed by: INTERNAL MEDICINE

## 2020-12-21 PROCEDURE — 99499 UNLISTED E&M SERVICE: CPT | Mod: S$GLB,,, | Performed by: INTERNAL MEDICINE

## 2020-12-21 PROCEDURE — 3288F PR FALLS RISK ASSESSMENT DOCUMENTED: ICD-10-PCS | Mod: CPTII,S$GLB,, | Performed by: INTERNAL MEDICINE

## 2020-12-21 PROCEDURE — 3078F PR MOST RECENT DIASTOLIC BLOOD PRESSURE < 80 MM HG: ICD-10-PCS | Mod: CPTII,S$GLB,, | Performed by: INTERNAL MEDICINE

## 2020-12-21 PROCEDURE — 99999 PR PBB SHADOW E&M-EST. PATIENT-LVL III: CPT | Mod: PBBFAC,,, | Performed by: INTERNAL MEDICINE

## 2020-12-21 PROCEDURE — 71046 X-RAY EXAM CHEST 2 VIEWS: CPT | Mod: TC,FY,PO

## 2020-12-21 PROCEDURE — 3288F FALL RISK ASSESSMENT DOCD: CPT | Mod: CPTII,S$GLB,, | Performed by: INTERNAL MEDICINE

## 2020-12-21 PROCEDURE — 3074F SYST BP LT 130 MM HG: CPT | Mod: CPTII,S$GLB,, | Performed by: INTERNAL MEDICINE

## 2020-12-21 PROCEDURE — 93010 EKG 12-LEAD: ICD-10-PCS | Mod: S$GLB,,, | Performed by: INTERNAL MEDICINE

## 2020-12-21 PROCEDURE — 3078F DIAST BP <80 MM HG: CPT | Mod: CPTII,S$GLB,, | Performed by: INTERNAL MEDICINE

## 2020-12-21 PROCEDURE — 87502 INFLUENZA DNA AMP PROBE: CPT | Mod: PO

## 2020-12-21 PROCEDURE — 93005 EKG 12-LEAD: ICD-10-PCS | Mod: S$GLB,,, | Performed by: INTERNAL MEDICINE

## 2020-12-21 PROCEDURE — 99999 PR PBB SHADOW E&M-EST. PATIENT-LVL III: ICD-10-PCS | Mod: PBBFAC,,, | Performed by: INTERNAL MEDICINE

## 2020-12-21 PROCEDURE — 3074F PR MOST RECENT SYSTOLIC BLOOD PRESSURE < 130 MM HG: ICD-10-PCS | Mod: CPTII,S$GLB,, | Performed by: INTERNAL MEDICINE

## 2020-12-21 PROCEDURE — 1159F MED LIST DOCD IN RCRD: CPT | Mod: S$GLB,,, | Performed by: INTERNAL MEDICINE

## 2020-12-21 PROCEDURE — 71046 XR CHEST PA AND LATERAL: ICD-10-PCS | Mod: 26,,, | Performed by: RADIOLOGY

## 2020-12-21 PROCEDURE — 1101F PT FALLS ASSESS-DOCD LE1/YR: CPT | Mod: CPTII,S$GLB,, | Performed by: INTERNAL MEDICINE

## 2020-12-21 PROCEDURE — U0003 INFECTIOUS AGENT DETECTION BY NUCLEIC ACID (DNA OR RNA); SEVERE ACUTE RESPIRATORY SYNDROME CORONAVIRUS 2 (SARS-COV-2) (CORONAVIRUS DISEASE [COVID-19]), AMPLIFIED PROBE TECHNIQUE, MAKING USE OF HIGH THROUGHPUT TECHNOLOGIES AS DESCRIBED BY CMS-2020-01-R: HCPCS

## 2020-12-21 PROCEDURE — 93010 ELECTROCARDIOGRAM REPORT: CPT | Mod: S$GLB,,, | Performed by: INTERNAL MEDICINE

## 2020-12-21 PROCEDURE — 99214 PR OFFICE/OUTPT VISIT, EST, LEVL IV, 30-39 MIN: ICD-10-PCS | Mod: S$GLB,,, | Performed by: INTERNAL MEDICINE

## 2020-12-21 PROCEDURE — 93005 ELECTROCARDIOGRAM TRACING: CPT | Mod: S$GLB,,, | Performed by: INTERNAL MEDICINE

## 2020-12-21 PROCEDURE — 1101F PR PT FALLS ASSESS DOC 0-1 FALLS W/OUT INJ PAST YR: ICD-10-PCS | Mod: CPTII,S$GLB,, | Performed by: INTERNAL MEDICINE

## 2020-12-21 NOTE — PROGRESS NOTES
Subjective:       Patient ID: Za Limon is a 80 y.o. female.    Patient Active Problem List   Diagnosis    Syncope and collapse    Essential hypertension    Hyperlipidemia    CLL (chronic lymphocytic leukemia)    First degree AV block    Left anterior hemiblock    Other chest pain    Second degree AV block    Orthostatic hypotension    Premature atrial contractions    History of PSVT (paroxysmal supraventricular tachycardia)    Major depression, recurrent, chronic    Gastroesophageal reflux disease without esophagitis    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    Dyspnea on exertion    Nonspecific abnormal electrocardiogram (ECG) (EKG)    PSVT (paroxysmal supraventricular tachycardia)    Syncope    Closed nondisplaced fracture of seventh cervical vertebra    PVC (premature ventricular contraction)       Chief Complaint: Cough and Headache    This patient is new to me       URI     Patient reports cough, headache, dizziness for roughly 1 month.  She reports that this has started this morning.  She states that the cough typically occurs at night 100 daily basis.  She has had fatigue symptoms often on.  She does have a headaches off and on over the last month.  She denies any fevers or night sweats.  She denies any noted weight loss.  She has reports some shortness of breath.  She does not have any chest pain.  She has no episodes of passing out.  She denies any sick contacts.  She does not travel outside her house except for groceries.  She has no recent travel outside the state or country.  She has been using over-the-counter cough medications for the symptoms including Robitussin.  She has also been using Tylenol.  Patient has a history of frequent PACs seen by Cardiology not a candidate per her report for pacemaker.    HPI  Review of Systems      Objective:       BP (!) 117/58 (BP Location: Right arm, Patient Position: Sitting, BP Method: Medium (Manual))   Pulse (!) 58   Temp  "97.3 °F (36.3 °C) (Oral)   Resp 16   Ht 5' 6" (1.676 m)   Wt 76 kg (167 lb 8.8 oz)   SpO2 97%   BMI 27.04 kg/m²     Physical Exam  Vitals signs and nursing note reviewed.   Constitutional:       General: She is not in acute distress.     Appearance: She is well-developed. She is not diaphoretic.   HENT:      Head: Normocephalic.      Nose: Nose normal.   Eyes:      General:         Right eye: No discharge.         Left eye: No discharge.      Conjunctiva/sclera: Conjunctivae normal.      Pupils: Pupils are equal, round, and reactive to light.   Neck:      Musculoskeletal: Normal range of motion.   Cardiovascular:      Rate and Rhythm: Normal rate. Rhythm irregular.      Heart sounds: Normal heart sounds. No murmur. No friction rub. No gallop.    Pulmonary:      Effort: Pulmonary effort is normal. No respiratory distress.   Abdominal:      General: Bowel sounds are normal. There is no distension.      Palpations: Abdomen is soft.   Musculoskeletal: Normal range of motion.         General: No deformity.   Skin:     General: Skin is warm.   Neurological:      Mental Status: She is alert and oriented to person, place, and time.      Cranial Nerves: No cranial nerve deficit.             ASCVD  The ASCVD Risk score ([employer] [place] [employer] et al., 2013) failed to calculate for the following reasons:    The 2013 ASCVD risk score is only valid for ages 40 to 79    Basic Labs  CMP  Sodium   Date Value Ref Range Status   02/24/2020 139 136 - 145 mmol/L Final     Potassium   Date Value Ref Range Status   02/24/2020 4.4 3.5 - 5.1 mmol/L Final     Chloride   Date Value Ref Range Status   02/24/2020 108 95 - 110 mmol/L Final     CO2   Date Value Ref Range Status   02/24/2020 23 23 - 29 mmol/L Final     Glucose   Date Value Ref Range Status   02/24/2020 102 70 - 110 mg/dL Final     BUN   Date Value Ref Range Status   02/24/2020 17 8 - 23 mg/dL Final     Creatinine   Date Value Ref Range Status   02/24/2020 1.2 0.5 - 1.4 mg/dL Final "     Calcium   Date Value Ref Range Status   02/24/2020 8.7 8.7 - 10.5 mg/dL Final     Total Protein   Date Value Ref Range Status   02/24/2020 5.8 (L) 6.0 - 8.4 g/dL Final     Albumin   Date Value Ref Range Status   02/24/2020 3.3 (L) 3.5 - 5.2 g/dL Final     Total Bilirubin   Date Value Ref Range Status   02/24/2020 0.6 0.1 - 1.0 mg/dL Final     Comment:     For infants and newborns, interpretation of results should be based  on gestational age, weight and in agreement with clinical  observations.  Premature Infant recommended reference ranges:  Up to 24 hours.............<8.0 mg/dL  Up to 48 hours............<12.0 mg/dL  3-5 days..................<15.0 mg/dL  6-29 days.................<15.0 mg/dL       Alkaline Phosphatase   Date Value Ref Range Status   02/24/2020 42 (L) 55 - 135 U/L Final     AST   Date Value Ref Range Status   02/24/2020 18 10 - 40 U/L Final     ALT   Date Value Ref Range Status   02/24/2020 10 10 - 44 U/L Final     Anion Gap   Date Value Ref Range Status   02/24/2020 8 8 - 16 mmol/L Final     eGFR if    Date Value Ref Range Status   02/24/2020 49 (A) >60 mL/min/1.73 m^2 Final     eGFR if non    Date Value Ref Range Status   02/24/2020 43 (A) >60 mL/min/1.73 m^2 Final     Comment:     Calculation used to obtain the estimated glomerular filtration  rate (eGFR) is the CKD-EPI equation.        Lab Results   Component Value Date    WBC 19.79 (H) 02/24/2020    HGB 9.9 (L) 02/24/2020    HCT 30.7 (L) 02/24/2020    MCV 97 02/24/2020     (L) 02/24/2020       No results found for: TSH      STD  No results found for: HIV1X2, [id number]  No results found for: RPR  No results found for: HAV, HEPAIGM, HEPBIGM, HEPBCAB, HBEAG, HEPCAB    Lipids  Lab Results   Component Value Date    CHOL 134 02/04/2019     Lab Results   Component Value Date    HDL 43 02/04/2019     Lab Results   Component Value Date    LDLCALC 75.2 02/04/2019     Lab Results   Component Value Date    TRIG  79 02/04/2019     Lab Results   Component Value Date    CHOLHDL 32.1 02/04/2019       DM  Lab Results   Component Value Date    HGBA1C 5.6 02/04/2019     I personally reviewed the EKG and demonstrates no signs of qwave, peaked twaves ro ST elevation or depresssion. Frequent PACs      Assessment:       1. Cardiac arrhythmia, unspecified cardiac arrhythmia type    2. Cough    3. Fatigue, unspecified type    4. PSVT (paroxysmal supraventricular tachycardia)    5. CLL (chronic lymphocytic leukemia)        Plan:       Za was seen today for cough and headache.    Diagnoses and all orders for this visit:    Cardiac arrhythmia, unspecified cardiac arrhythmia type  New;uncontrolled  Query relation of frequent PAC to fatigue and dizziness symptoms  Checked EKG to confirm no Afib   reviewed signs and symptoms that should prompt return to provider or evaluation in the ED  -     EKG 12-lead    Cough  Unclear etiolgoy   history or pyhsical exam does not suggest bacterial infection such as PNA  Given the current outbreak and patient symptoms recommended COVID testing  I provided instruction on supportive care measures   reviewed signs and symptoms that should prompt return to provider or evaluation in the ED  -     Basic Metabolic Panel; Future  -     CBC Auto Differential; Future  -     X-Ray Chest PA And Lateral; Future    Fatigue, unspecified type  New;uncontrolled  Unclear etiology  Consider arrythmia vs viral illness vs CLL  Check labs, xray  -     TSH; Future  -     BNP; Future  -     X-Ray Chest PA And Lateral; Future            Future Appointments   Date Time Provider Department Center   12/21/2020  3:15 PM LAB, [facility] [place]   12/24/2020 11:00 AM Stepan Blanco MD [facility] CARDIO [facility]   1/26/2021  1:20 PM Mina Solorzano MD [facility] FAM MED [facility]         Medication List with Changes/Refills   Current Medications    ACETAMINOPHEN (TYLENOL) 500 MG TABLET    Take 500 mg by mouth every 6 (six) hours  as needed for Pain.    ALENDRONATE (FOSAMAX) 70 MG TABLET    Take 1 tablet (70 mg total) by mouth once a week.    ASPIRIN (ECOTRIN) 81 MG EC TABLET    Take 81 mg by mouth once daily.    DICLOFENAC SODIUM (VOLTAREN) 1 % GEL    Apply 2 g topically 2 (two) times daily.    DIPHENHYDRAMINE (BENADRYL) 25 MG CAPSULE    Take 25 mg by mouth nightly as needed for Itching.    ESCITALOPRAM OXALATE (LEXAPRO) 10 MG TABLET    TAKE 1 TABLET BY MOUTH EVERY DAY    GUAIFENESIN/DEXTROMETHORPHAN (ROBITUSSIN-DM ORAL)    Take by mouth every evening.    LOSARTAN (COZAAR) 100 MG TABLET    Take 1 tablet (100 mg total) by mouth once daily.    MECLIZINE (ANTIVERT) 25 MG TABLET    Take 1 tablet (25 mg total) by mouth 3 (three) times daily. Take 3 times daily for 3 days, then nightly for 3 days, then as needed for 3 days    OMEPRAZOLE (PRILOSEC) 20 MG CAPSULE    Take 1 capsule (20 mg total) by mouth once daily.    ROSUVASTATIN (CRESTOR) 40 MG TAB    TAKE 1 TABLET BY MOUTH EVERY DAY IN THE EVENING         Disclaimer:  This note has been generated using voice-recognition software. There may be grammatical or spelling errors that have been missed during proof-reading

## 2020-12-21 NOTE — TELEPHONE ENCOUNTER
----- Message from Jovita Brown sent at 12/21/2020 12:00 PM CST -----  Contact: [phone]  Who Called: PT  Regarding: returning call   Would the patient rather a call back or a response via [facility]? Call back  Best Call Back Number:[phone]  Additional Information: pt has a cold and wanted to know if something can be called in for her to take

## 2020-12-21 NOTE — TELEPHONE ENCOUNTER
Spoke to pt and stated that she has a cough, headache, bodyaches, but no fever. Pt was scheduled for an appt today.

## 2020-12-21 NOTE — PROGRESS NOTES
Greetings    The results of your lab work / imaging test was all normal . We are still awaiting your covid swab.  Please let us know if you have any questions

## 2020-12-22 ENCOUNTER — TELEPHONE (OUTPATIENT)
Dept: FAMILY MEDICINE | Facility: CLINIC | Age: 80
End: 2020-12-22

## 2020-12-22 LAB — SARS-COV-2 RNA RESP QL NAA+PROBE: NOT DETECTED

## 2020-12-22 NOTE — TELEPHONE ENCOUNTER
"Spoke with patient's daughter Keshia Mcclendon. Notified daughter of negative COVID test. Daughter voices further concerns in regards to imaging results. She requested information on "aortic plaque" found on imaging. I notified her that per Dr. May we will send Dr. Jiang a message to further clarify this for her as he will be back in the office tomorrow. Daughter voices understanding.   "

## 2020-12-23 DIAGNOSIS — N18.9 CHRONIC KIDNEY DISEASE, UNSPECIFIED CKD STAGE: ICD-10-CM

## 2020-12-23 NOTE — PROGRESS NOTES
Patient, Za Limon (MRN #[id number]), presented with a recent Estimated Glumerular Filtration Rate (EGFR) between 30 and 45 consistent with the definition of chronic kidney disease stage 3 - moderate (ICD10 - N18.3).    eGFR if    Date Value Ref Range Status   12/21/2020 40.6 (A) >60 mL/min/1.73 m^2 Final       The patient's chronic kidney disease stage 3 was monitored, evaluated, addressed and/or treated. This addendum to the medical record is made on 12/23/2020.

## 2020-12-24 ENCOUNTER — OFFICE VISIT (OUTPATIENT)
Dept: CARDIOLOGY | Facility: CLINIC | Age: 80
End: 2020-12-24
Payer: MEDICARE

## 2020-12-24 VITALS
DIASTOLIC BLOOD PRESSURE: 60 MMHG | HEIGHT: 66 IN | BODY MASS INDEX: 26.66 KG/M2 | HEART RATE: 45 BPM | SYSTOLIC BLOOD PRESSURE: 136 MMHG | WEIGHT: 165.88 LBS | OXYGEN SATURATION: 100 %

## 2020-12-24 DIAGNOSIS — R07.9 CHEST PAIN OF UNCERTAIN ETIOLOGY: ICD-10-CM

## 2020-12-24 DIAGNOSIS — I47.10 PSVT (PAROXYSMAL SUPRAVENTRICULAR TACHYCARDIA): Primary | ICD-10-CM

## 2020-12-24 DIAGNOSIS — I10 ESSENTIAL HYPERTENSION: ICD-10-CM

## 2020-12-24 DIAGNOSIS — I44.0 FIRST DEGREE AV BLOCK: ICD-10-CM

## 2020-12-24 DIAGNOSIS — R06.09 DYSPNEA ON EXERTION: ICD-10-CM

## 2020-12-24 DIAGNOSIS — Z86.79 HISTORY OF PSVT (PAROXYSMAL SUPRAVENTRICULAR TACHYCARDIA): ICD-10-CM

## 2020-12-24 DIAGNOSIS — I44.4 LEFT ANTERIOR HEMIBLOCK: ICD-10-CM

## 2020-12-24 DIAGNOSIS — I95.1 ORTHOSTATIC HYPOTENSION: ICD-10-CM

## 2020-12-24 DIAGNOSIS — K21.9 GASTROESOPHAGEAL REFLUX DISEASE WITHOUT ESOPHAGITIS: ICD-10-CM

## 2020-12-24 DIAGNOSIS — R94.31 NONSPECIFIC ABNORMAL ELECTROCARDIOGRAM (ECG) (EKG): ICD-10-CM

## 2020-12-24 DIAGNOSIS — I49.1 PREMATURE ATRIAL CONTRACTIONS: ICD-10-CM

## 2020-12-24 DIAGNOSIS — I49.3 PVC (PREMATURE VENTRICULAR CONTRACTION): ICD-10-CM

## 2020-12-24 DIAGNOSIS — R55 SYNCOPE AND COLLAPSE: ICD-10-CM

## 2020-12-24 DIAGNOSIS — C91.10 CLL (CHRONIC LYMPHOCYTIC LEUKEMIA): ICD-10-CM

## 2020-12-24 PROCEDURE — 99499 RISK ADDL DX/OHS AUDIT: ICD-10-PCS | Mod: S$GLB,,, | Performed by: INTERNAL MEDICINE

## 2020-12-24 PROCEDURE — 3078F DIAST BP <80 MM HG: CPT | Mod: CPTII,S$GLB,, | Performed by: INTERNAL MEDICINE

## 2020-12-24 PROCEDURE — 1101F PT FALLS ASSESS-DOCD LE1/YR: CPT | Mod: CPTII,S$GLB,, | Performed by: INTERNAL MEDICINE

## 2020-12-24 PROCEDURE — 3288F PR FALLS RISK ASSESSMENT DOCUMENTED: ICD-10-PCS | Mod: CPTII,S$GLB,, | Performed by: INTERNAL MEDICINE

## 2020-12-24 PROCEDURE — 99499 UNLISTED E&M SERVICE: CPT | Mod: S$GLB,,, | Performed by: INTERNAL MEDICINE

## 2020-12-24 PROCEDURE — 1159F MED LIST DOCD IN RCRD: CPT | Mod: S$GLB,,, | Performed by: INTERNAL MEDICINE

## 2020-12-24 PROCEDURE — 3075F PR MOST RECENT SYSTOLIC BLOOD PRESS GE 130-139MM HG: ICD-10-PCS | Mod: CPTII,S$GLB,, | Performed by: INTERNAL MEDICINE

## 2020-12-24 PROCEDURE — 3075F SYST BP GE 130 - 139MM HG: CPT | Mod: CPTII,S$GLB,, | Performed by: INTERNAL MEDICINE

## 2020-12-24 PROCEDURE — 99999 PR PBB SHADOW E&M-EST. PATIENT-LVL III: ICD-10-PCS | Mod: PBBFAC,,, | Performed by: INTERNAL MEDICINE

## 2020-12-24 PROCEDURE — 93000 ELECTROCARDIOGRAM COMPLETE: CPT | Mod: S$GLB,,, | Performed by: INTERNAL MEDICINE

## 2020-12-24 PROCEDURE — 99214 OFFICE O/P EST MOD 30 MIN: CPT | Mod: S$GLB,,, | Performed by: INTERNAL MEDICINE

## 2020-12-24 PROCEDURE — 1159F PR MEDICATION LIST DOCUMENTED IN MEDICAL RECORD: ICD-10-PCS | Mod: S$GLB,,, | Performed by: INTERNAL MEDICINE

## 2020-12-24 PROCEDURE — 99999 PR PBB SHADOW E&M-EST. PATIENT-LVL III: CPT | Mod: PBBFAC,,, | Performed by: INTERNAL MEDICINE

## 2020-12-24 PROCEDURE — 93000 EKG 12-LEAD: ICD-10-PCS | Mod: S$GLB,,, | Performed by: INTERNAL MEDICINE

## 2020-12-24 PROCEDURE — 1101F PR PT FALLS ASSESS DOC 0-1 FALLS W/OUT INJ PAST YR: ICD-10-PCS | Mod: CPTII,S$GLB,, | Performed by: INTERNAL MEDICINE

## 2020-12-24 PROCEDURE — 3078F PR MOST RECENT DIASTOLIC BLOOD PRESSURE < 80 MM HG: ICD-10-PCS | Mod: CPTII,S$GLB,, | Performed by: INTERNAL MEDICINE

## 2020-12-24 PROCEDURE — 99214 PR OFFICE/OUTPT VISIT, EST, LEVL IV, 30-39 MIN: ICD-10-PCS | Mod: S$GLB,,, | Performed by: INTERNAL MEDICINE

## 2020-12-24 PROCEDURE — 1125F AMNT PAIN NOTED PAIN PRSNT: CPT | Mod: S$GLB,,, | Performed by: INTERNAL MEDICINE

## 2020-12-24 PROCEDURE — 3288F FALL RISK ASSESSMENT DOCD: CPT | Mod: CPTII,S$GLB,, | Performed by: INTERNAL MEDICINE

## 2020-12-24 PROCEDURE — 1125F PR PAIN SEVERITY QUANTIFIED, PAIN PRESENT: ICD-10-PCS | Mod: S$GLB,,, | Performed by: INTERNAL MEDICINE

## 2020-12-24 NOTE — PROGRESS NOTES
"  Subjective:      Patient ID: Za Limon is a 80 y.o. female.    Chief Complaint: Follow-up    HPI:  Pt c/o shortness of breath when walking in the house.    "I am getting slow, I cannot walk fast."    "I have been coughing a good bit with white to yellow sputum"    "Sometimes my food wants to come up on my left side"    "I have headaches and dizziness."    Pt recently tested negative for flu and covid.    Anterior chest is sore but it is feeling better.    No fever    Review of Systems   Cardiovascular: Positive for chest pain, dyspnea on exertion, leg swelling (chronic LLE edema) and palpitations. Negative for claudication, irregular heartbeat, near-syncope, orthopnea and syncope.      No fall or faint since February        Past Medical History:   Diagnosis Date    2nd degree AV block 5/6/2016    CLL (chronic lymphocytic leukemia)     Hyperlipidemia     Hypertension         Past Surgical History:   Procedure Laterality Date    HYSTERECTOMY      left leg laser surgery         Family History   Problem Relation Age of Onset    Stroke Father     Heart disease Sister     Heart disease Brother        Social History     Socioeconomic History    Marital status:      Spouse name: Not on file    Number of children: Not on file    Years of education: Not on file    Highest education level: Not on file   Occupational History    Not on file   Social Needs    Financial resource strain: Not on file    Food insecurity     Worry: Not on file     Inability: Not on file    Transportation needs     Medical: Not on file     Non-medical: Not on file   Tobacco Use    Smoking status: Never Smoker    Smokeless tobacco: Never Used   Substance and Sexual Activity    Alcohol use: Not on file    Drug use: Not on file    Sexual activity: Not Currently   Lifestyle    Physical activity     Days per week: Not on file     Minutes per session: Not on file    Stress: Not on file   Relationships    Social " connections     Talks on phone: Not on file     Gets together: Not on file     Attends Evangelical service: Not on file     Active member of club or organization: Not on file     Attends meetings of clubs or organizations: Not on file     Relationship status: Not on file   Other Topics Concern    Not on file   Social History Narrative    Not on file       Current Outpatient Medications on File Prior to Visit   Medication Sig Dispense Refill    acetaminophen (TYLENOL) 500 MG tablet Take 500 mg by mouth every 6 (six) hours as needed for Pain.      alendronate (FOSAMAX) 70 MG tablet Take 1 tablet (70 mg total) by mouth once a week. 12 tablet 1    aspirin (ECOTRIN) 81 MG EC tablet Take 81 mg by mouth once daily.      diclofenac sodium (VOLTAREN) 1 % Gel Apply 2 g topically 2 (two) times daily. 1 Tube 2    diphenhydrAMINE (BENADRYL) 25 mg capsule Take 25 mg by mouth nightly as needed for Itching.      escitalopram oxalate (LEXAPRO) 10 MG tablet TAKE 1 TABLET BY MOUTH EVERY DAY 90 tablet 3    guaifenesin/dextromethorphan (ROBITUSSIN-DM ORAL) Take by mouth every evening.      losartan (COZAAR) 100 MG tablet Take 1 tablet (100 mg total) by mouth once daily. 90 tablet 3    omeprazole (PRILOSEC) 20 MG capsule Take 1 capsule (20 mg total) by mouth once daily. 90 capsule 3    rosuvastatin (CRESTOR) 40 MG Tab TAKE 1 TABLET BY MOUTH EVERY DAY IN THE EVENING 90 tablet 3    meclizine (ANTIVERT) 25 mg tablet Take 1 tablet (25 mg total) by mouth 3 (three) times daily. Take 3 times daily for 3 days, then nightly for 3 days, then as needed for 3 days 30 tablet 0     No current facility-administered medications on file prior to visit.        Review of patient's allergies indicates:   Allergen Reactions    Codeine      Objective:     Vitals:    12/24/20 1206 12/24/20 1229   BP: 138/89 136/60   BP Location: Left arm Left arm   Patient Position: Sitting Sitting   BP Method: Large (Automatic)    Pulse: (!) 45    SpO2: 100%   "  Weight: 75.3 kg (165 lb 14.3 oz)    Height: 5' 6" (1.676 m)         Physical Exam   Constitutional: She is oriented to person, place, and time. She appears well-developed and well-nourished.   Eyes: No scleral icterus.   Neck: No JVD present. Carotid bruit is not present.   Cardiovascular: Normal rate and regular rhythm. Exam reveals no gallop.   No murmur heard.  Pulmonary/Chest: Breath sounds normal.   Musculoskeletal:         General: No edema.   Neurological: She is alert and oriented to person, place, and time.   Skin: Skin is warm and dry.   Psychiatric: She has a normal mood and affect. Her behavior is normal. Judgment and thought content normal.        Wt down 6 lbs over past 2 years    CXR this month shows calcified aorta and clear lungs and normal heart size    LDL 75 last year    Lab Visit on 12/21/2020   Component Date Value Ref Range Status    Sodium 12/21/2020 142  136 - 145 mmol/L Final    Potassium 12/21/2020 4.8  3.5 - 5.1 mmol/L Final    Chloride 12/21/2020 106  95 - 110 mmol/L Final    CO2 12/21/2020 25  23 - 29 mmol/L Final    Glucose 12/21/2020 103  70 - 110 mg/dL Final    BUN 12/21/2020 17  8 - 23 mg/dL Final    Creatinine 12/21/2020 1.4  0.5 - 1.4 mg/dL Final    Calcium 12/21/2020 9.8  8.7 - 10.5 mg/dL Final    Anion Gap 12/21/2020 11  8 - 16 mmol/L Final    eGFR if  12/21/2020 40.6* >60 mL/min/1.73 m^2 Final    eGFR if non African American 12/21/2020 35.3* >60 mL/min/1.73 m^2 Final    WBC 12/21/2020 23.57* 3.90 - 12.70 K/uL Final    RBC 12/21/2020 3.72* 4.00 - 5.40 M/uL Final    Hemoglobin 12/21/2020 11.7* 12.0 - 16.0 g/dL Final    Hematocrit 12/21/2020 37.7  37.0 - 48.5 % Final    MCV 12/21/2020 101* 82 - 98 fL Final    MCH 12/21/2020 31.5* 27.0 - 31.0 pg Final    MCHC 12/21/2020 31.0* 32.0 - 36.0 g/dL Final    RDW 12/21/2020 14.1  11.5 - 14.5 % Final    Platelets 12/21/2020 199  150 - 350 K/uL Final    MPV 12/21/2020 12.7  9.2 - 12.9 fL Final    " Immature Granulocytes 12/21/2020 CANCELED  0.0 - 0.5 % Final    Immature Grans (Abs) 12/21/2020 CANCELED  0.00 - 0.04 K/uL Final    nRBC 12/21/2020 0  0 /100 WBC Final    Gran % 12/21/2020 26.0* 38.0 - 73.0 % Final    Lymph % 12/21/2020 71.0* 18.0 - 48.0 % Final    Mono % 12/21/2020 3.0* 4.0 - 15.0 % Final    Eosinophil % 12/21/2020 0.0  0.0 - 8.0 % Final    Basophil % 12/21/2020 0.0  0.0 - 1.9 % Final    Differential Method 12/21/2020 Manual   Final    TSH 12/21/2020 2.444  0.400 - 4.000 uIU/mL Final    BNP 12/21/2020 125* 0 - 99 pg/mL Final    Pathologist Review Peripheral Smear 12/21/2020 REVIEWED   Final   Office Visit on 12/21/2020   Component Date Value Ref Range Status    Influenza A, Molecular 12/21/2020 Negative  Negative Final    Influenza B, Molecular 12/21/2020 Negative  Negative Final    Flu A & B Source 12/21/2020 NP   Final    SARS-CoV2 (COVID-19) Qualitative P* 12/21/2020 Not Detected  Not Detected Final   (      ECG today: sinus bradycardia with first degree AV block and PAC's      Assessment:     1. PSVT (paroxysmal supraventricular tachycardia)    2. Premature atrial contractions    3. Left anterior hemiblock    4. History of PSVT (paroxysmal supraventricular tachycardia)    5. First degree AV block    6. Essential hypertension    7. CLL (chronic lymphocytic leukemia)    8. Gastroesophageal reflux disease without esophagitis    9. Syncope and collapse    10. Nonspecific abnormal electrocardiogram (ECG) (EKG)    11. Orthostatic hypotension    12. PVC (premature ventricular contraction)    13. Chest pain of uncertain etiology    14. Dyspnea on exertion      Plan:   Za was seen today for follow-up.    Diagnoses and all orders for this visit:    PSVT (paroxysmal supraventricular tachycardia)  -     IN OFFICE EKG 12-LEAD (to Muse)  -     Holter monitor - 24 hour; Future    Premature atrial contractions  -     IN OFFICE EKG 12-LEAD (to Muse)  -     Holter monitor - 24 hour;  Future    Left anterior hemiblock  -     Holter monitor - 24 hour; Future    History of PSVT (paroxysmal supraventricular tachycardia)  -     IN OFFICE EKG 12-LEAD (to Muse)  -     Holter monitor - 24 hour; Future    First degree AV block  -     IN OFFICE EKG 12-LEAD (to Muse)  -     Holter monitor - 24 hour; Future    Essential hypertension  -     CBC Auto Differential; Future  -     Comprehensive Metabolic Panel; Future  -     Lipid Panel; Future  -     TSH; Future  -     CK; Future    CLL (chronic lymphocytic leukemia)    Gastroesophageal reflux disease without esophagitis    Syncope and collapse    Nonspecific abnormal electrocardiogram (ECG) (EKG)    Orthostatic hypotension    PVC (premature ventricular contraction)    Chest pain of uncertain etiology  -     TSH; Future  -     Stress Echo Which stress agent will be used? Treadmill Exercise; Color Flow Doppler? No; Future  -     Holter monitor - 24 hour; Future    Dyspnea on exertion  -     TSH; Future  -     Stress Echo Which stress agent will be used? Treadmill Exercise; Color Flow Doppler? No; Future  -     Holter monitor - 24 hour; Future     Due to palpitations and known arrhythmias will repeat the holter and lab    Due to the chest discomfort and dyspnea on exertion will get stress echo Patti protocol    Same meds    RTC 6 months    Follow up in about 6 months (around 6/24/2021).

## 2020-12-31 ENCOUNTER — TELEPHONE (OUTPATIENT)
Dept: CARDIOLOGY | Facility: CLINIC | Age: 80
End: 2020-12-31

## 2020-12-31 NOTE — TELEPHONE ENCOUNTER
Appointment for stress and holter scheduled.  Follow up appointment with labs scheduled.  Message left on daughters voice mail with dates and times of all appointments.  Reminder letters mailed

## 2021-01-11 ENCOUNTER — TELEPHONE (OUTPATIENT)
Dept: CARDIOLOGY | Facility: CLINIC | Age: 81
End: 2021-01-11

## 2021-05-04 ENCOUNTER — PATIENT MESSAGE (OUTPATIENT)
Dept: RESEARCH | Facility: HOSPITAL | Age: 81
End: 2021-05-04

## 2021-05-10 ENCOUNTER — PATIENT MESSAGE (OUTPATIENT)
Dept: RESEARCH | Facility: HOSPITAL | Age: 81
End: 2021-05-10

## 2021-05-26 ENCOUNTER — LAB VISIT (OUTPATIENT)
Dept: LAB | Facility: HOSPITAL | Age: 81
End: 2021-05-26
Attending: INTERNAL MEDICINE
Payer: MEDICARE

## 2021-05-26 ENCOUNTER — OFFICE VISIT (OUTPATIENT)
Dept: INTERNAL MEDICINE | Facility: CLINIC | Age: 81
End: 2021-05-26
Payer: MEDICARE

## 2021-05-26 VITALS
SYSTOLIC BLOOD PRESSURE: 130 MMHG | HEIGHT: 68 IN | WEIGHT: 170.63 LBS | DIASTOLIC BLOOD PRESSURE: 70 MMHG | HEART RATE: 83 BPM | BODY MASS INDEX: 25.86 KG/M2 | RESPIRATION RATE: 17 BRPM | OXYGEN SATURATION: 97 %

## 2021-05-26 DIAGNOSIS — C91.10 CLL (CHRONIC LYMPHOCYTIC LEUKEMIA): ICD-10-CM

## 2021-05-26 DIAGNOSIS — I10 ESSENTIAL HYPERTENSION: Primary | ICD-10-CM

## 2021-05-26 DIAGNOSIS — I47.10 PSVT (PAROXYSMAL SUPRAVENTRICULAR TACHYCARDIA): ICD-10-CM

## 2021-05-26 DIAGNOSIS — E78.00 PURE HYPERCHOLESTEROLEMIA: ICD-10-CM

## 2021-05-26 DIAGNOSIS — Z71.89 ADVANCE CARE PLANNING: ICD-10-CM

## 2021-05-26 DIAGNOSIS — I10 ESSENTIAL HYPERTENSION: ICD-10-CM

## 2021-05-26 DIAGNOSIS — F33.9 MAJOR DEPRESSION, RECURRENT, CHRONIC: ICD-10-CM

## 2021-05-26 PROCEDURE — 85007 BL SMEAR W/DIFF WBC COUNT: CPT | Performed by: INTERNAL MEDICINE

## 2021-05-26 PROCEDURE — 99999 PR PBB SHADOW E&M-EST. PATIENT-LVL IV: ICD-10-PCS | Mod: PBBFAC,,, | Performed by: INTERNAL MEDICINE

## 2021-05-26 PROCEDURE — 3288F FALL RISK ASSESSMENT DOCD: CPT | Mod: CPTII,S$GLB,, | Performed by: INTERNAL MEDICINE

## 2021-05-26 PROCEDURE — 99215 PR OFFICE/OUTPT VISIT, EST, LEVL V, 40-54 MIN: ICD-10-PCS | Mod: S$GLB,,, | Performed by: INTERNAL MEDICINE

## 2021-05-26 PROCEDURE — 1159F MED LIST DOCD IN RCRD: CPT | Mod: S$GLB,,, | Performed by: INTERNAL MEDICINE

## 2021-05-26 PROCEDURE — 85060 BLOOD SMEAR INTERPRETATION: CPT | Mod: ,,, | Performed by: PATHOLOGY

## 2021-05-26 PROCEDURE — 1159F PR MEDICATION LIST DOCUMENTED IN MEDICAL RECORD: ICD-10-PCS | Mod: S$GLB,,, | Performed by: INTERNAL MEDICINE

## 2021-05-26 PROCEDURE — 80061 LIPID PANEL: CPT | Performed by: INTERNAL MEDICINE

## 2021-05-26 PROCEDURE — 83880 ASSAY OF NATRIURETIC PEPTIDE: CPT | Performed by: INTERNAL MEDICINE

## 2021-05-26 PROCEDURE — 1101F PR PT FALLS ASSESS DOC 0-1 FALLS W/OUT INJ PAST YR: ICD-10-PCS | Mod: CPTII,S$GLB,, | Performed by: INTERNAL MEDICINE

## 2021-05-26 PROCEDURE — 3288F PR FALLS RISK ASSESSMENT DOCUMENTED: ICD-10-PCS | Mod: CPTII,S$GLB,, | Performed by: INTERNAL MEDICINE

## 2021-05-26 PROCEDURE — 80053 COMPREHEN METABOLIC PANEL: CPT | Performed by: INTERNAL MEDICINE

## 2021-05-26 PROCEDURE — 1101F PT FALLS ASSESS-DOCD LE1/YR: CPT | Mod: CPTII,S$GLB,, | Performed by: INTERNAL MEDICINE

## 2021-05-26 PROCEDURE — 99499 UNLISTED E&M SERVICE: CPT | Mod: S$GLB,,, | Performed by: INTERNAL MEDICINE

## 2021-05-26 PROCEDURE — 99499 RISK ADDL DX/OHS AUDIT: ICD-10-PCS | Mod: S$GLB,,, | Performed by: INTERNAL MEDICINE

## 2021-05-26 PROCEDURE — 83615 LACTATE (LD) (LDH) ENZYME: CPT | Performed by: INTERNAL MEDICINE

## 2021-05-26 PROCEDURE — 85060 PATHOLOGIST REVIEW: ICD-10-PCS | Mod: ,,, | Performed by: PATHOLOGY

## 2021-05-26 PROCEDURE — 99215 OFFICE O/P EST HI 40 MIN: CPT | Mod: S$GLB,,, | Performed by: INTERNAL MEDICINE

## 2021-05-26 PROCEDURE — 99999 PR PBB SHADOW E&M-EST. PATIENT-LVL IV: CPT | Mod: PBBFAC,,, | Performed by: INTERNAL MEDICINE

## 2021-05-26 PROCEDURE — 84443 ASSAY THYROID STIM HORMONE: CPT | Performed by: INTERNAL MEDICINE

## 2021-05-26 PROCEDURE — 36415 COLL VENOUS BLD VENIPUNCTURE: CPT | Mod: PO | Performed by: INTERNAL MEDICINE

## 2021-05-26 PROCEDURE — 85027 COMPLETE CBC AUTOMATED: CPT | Performed by: INTERNAL MEDICINE

## 2021-05-26 RX ORDER — ESCITALOPRAM OXALATE 10 MG/1
10 TABLET ORAL DAILY
Qty: 90 TABLET | Refills: 1 | Status: SHIPPED | OUTPATIENT
Start: 2021-05-26 | End: 2023-01-30

## 2021-05-27 LAB
ALBUMIN SERPL BCP-MCNC: 4.4 G/DL (ref 3.5–5.2)
ALP SERPL-CCNC: 51 U/L (ref 55–135)
ALT SERPL W/O P-5'-P-CCNC: 19 U/L (ref 10–44)
ANION GAP SERPL CALC-SCNC: 11 MMOL/L (ref 8–16)
ANISOCYTOSIS BLD QL SMEAR: SLIGHT
AST SERPL-CCNC: 32 U/L (ref 10–40)
BASOPHILS # BLD AUTO: ABNORMAL K/UL (ref 0–0.2)
BASOPHILS NFR BLD: 0 % (ref 0–1.9)
BILIRUB SERPL-MCNC: 0.7 MG/DL (ref 0.1–1)
BNP SERPL-MCNC: 207 PG/ML (ref 0–99)
BUN SERPL-MCNC: 16 MG/DL (ref 8–23)
CALCIUM SERPL-MCNC: 9.9 MG/DL (ref 8.7–10.5)
CHLORIDE SERPL-SCNC: 107 MMOL/L (ref 95–110)
CHOLEST SERPL-MCNC: 136 MG/DL (ref 120–199)
CHOLEST/HDLC SERPL: 3 {RATIO} (ref 2–5)
CO2 SERPL-SCNC: 23 MMOL/L (ref 23–29)
CREAT SERPL-MCNC: 1.3 MG/DL (ref 0.5–1.4)
DIFFERENTIAL METHOD: ABNORMAL
EOSINOPHIL # BLD AUTO: ABNORMAL K/UL (ref 0–0.5)
EOSINOPHIL NFR BLD: 0.5 % (ref 0–8)
ERYTHROCYTE [DISTWIDTH] IN BLOOD BY AUTOMATED COUNT: 14 % (ref 11.5–14.5)
EST. GFR  (AFRICAN AMERICAN): 44.1 ML/MIN/1.73 M^2
EST. GFR  (NON AFRICAN AMERICAN): 38.3 ML/MIN/1.73 M^2
GLUCOSE SERPL-MCNC: 107 MG/DL (ref 70–110)
HCT VFR BLD AUTO: 36 % (ref 37–48.5)
HDLC SERPL-MCNC: 46 MG/DL (ref 40–75)
HDLC SERPL: 33.8 % (ref 20–50)
HGB BLD-MCNC: 10.9 G/DL (ref 12–16)
IMM GRANULOCYTES # BLD AUTO: ABNORMAL K/UL (ref 0–0.04)
IMM GRANULOCYTES NFR BLD AUTO: ABNORMAL % (ref 0–0.5)
LDH SERPL L TO P-CCNC: 182 U/L (ref 110–260)
LDLC SERPL CALC-MCNC: 69 MG/DL (ref 63–159)
LYMPHOCYTES # BLD AUTO: ABNORMAL K/UL (ref 1–4.8)
LYMPHOCYTES NFR BLD: 89 % (ref 18–48)
MCH RBC QN AUTO: 30.6 PG (ref 27–31)
MCHC RBC AUTO-ENTMCNC: 30.3 G/DL (ref 32–36)
MCV RBC AUTO: 101 FL (ref 82–98)
MONOCYTES # BLD AUTO: ABNORMAL K/UL (ref 0.3–1)
MONOCYTES NFR BLD: 0.5 % (ref 4–15)
NEUTROPHILS NFR BLD: 10 % (ref 38–73)
NONHDLC SERPL-MCNC: 90 MG/DL
NRBC BLD-RTO: 0 /100 WBC
OVALOCYTES BLD QL SMEAR: ABNORMAL
PATH REV BLD -IMP: NORMAL
PLATELET # BLD AUTO: 196 K/UL (ref 150–450)
PLATELET BLD QL SMEAR: ABNORMAL
PMV BLD AUTO: 12.6 FL (ref 9.2–12.9)
POIKILOCYTOSIS BLD QL SMEAR: SLIGHT
POTASSIUM SERPL-SCNC: 4.9 MMOL/L (ref 3.5–5.1)
PROT SERPL-MCNC: 7.1 G/DL (ref 6–8.4)
RBC # BLD AUTO: 3.56 M/UL (ref 4–5.4)
SODIUM SERPL-SCNC: 141 MMOL/L (ref 136–145)
TRIGL SERPL-MCNC: 105 MG/DL (ref 30–150)
TSH SERPL DL<=0.005 MIU/L-ACNC: 3.35 UIU/ML (ref 0.4–4)
WBC # BLD AUTO: 20.96 K/UL (ref 3.9–12.7)

## 2021-06-17 ENCOUNTER — OFFICE VISIT (OUTPATIENT)
Dept: ORTHOPEDICS | Facility: CLINIC | Age: 81
End: 2021-06-17
Payer: MEDICARE

## 2021-06-17 ENCOUNTER — HOSPITAL ENCOUNTER (OUTPATIENT)
Dept: RADIOLOGY | Facility: HOSPITAL | Age: 81
Discharge: HOME | End: 2021-06-17
Attending: PHYSICIAN ASSISTANT
Payer: MEDICARE

## 2021-06-17 ENCOUNTER — OFFICE VISIT (OUTPATIENT)
Dept: CARDIOLOGY | Facility: CLINIC | Age: 81
End: 2021-06-17
Payer: MEDICARE

## 2021-06-17 VITALS — HEIGHT: 68 IN | BODY MASS INDEX: 25.86 KG/M2 | WEIGHT: 170.63 LBS

## 2021-06-17 VITALS — HEART RATE: 68 BPM | BODY MASS INDEX: 26.15 KG/M2 | WEIGHT: 172 LBS | OXYGEN SATURATION: 99 %

## 2021-06-17 DIAGNOSIS — I10 ESSENTIAL HYPERTENSION: ICD-10-CM

## 2021-06-17 DIAGNOSIS — R06.09 DOE (DYSPNEA ON EXERTION): ICD-10-CM

## 2021-06-17 DIAGNOSIS — M79.605 LEFT LEG PAIN: Primary | ICD-10-CM

## 2021-06-17 DIAGNOSIS — E78.5 HYPERLIPIDEMIA, UNSPECIFIED HYPERLIPIDEMIA TYPE: ICD-10-CM

## 2021-06-17 DIAGNOSIS — Z86.79 HISTORY OF PSVT (PAROXYSMAL SUPRAVENTRICULAR TACHYCARDIA): Primary | ICD-10-CM

## 2021-06-17 DIAGNOSIS — M79.605 LEFT LEG PAIN: ICD-10-CM

## 2021-06-17 DIAGNOSIS — I47.10 PSVT (PAROXYSMAL SUPRAVENTRICULAR TACHYCARDIA): ICD-10-CM

## 2021-06-17 PROCEDURE — 73502 XR HIP WITH PELVIS WHEN PERFORMED, 2 OR 3 VIEWS LEFT: ICD-10-PCS | Mod: 26,LT,, | Performed by: RADIOLOGY

## 2021-06-17 PROCEDURE — 1126F AMNT PAIN NOTED NONE PRSNT: CPT | Mod: S$GLB,,, | Performed by: INTERNAL MEDICINE

## 2021-06-17 PROCEDURE — 1159F MED LIST DOCD IN RCRD: CPT | Mod: S$GLB,,, | Performed by: PHYSICIAN ASSISTANT

## 2021-06-17 PROCEDURE — 99215 OFFICE O/P EST HI 40 MIN: CPT | Mod: S$GLB,,, | Performed by: INTERNAL MEDICINE

## 2021-06-17 PROCEDURE — 1126F PR PAIN SEVERITY QUANTIFIED, NO PAIN PRESENT: ICD-10-PCS | Mod: S$GLB,,, | Performed by: INTERNAL MEDICINE

## 2021-06-17 PROCEDURE — 1159F PR MEDICATION LIST DOCUMENTED IN MEDICAL RECORD: ICD-10-PCS | Mod: S$GLB,,, | Performed by: INTERNAL MEDICINE

## 2021-06-17 PROCEDURE — 99999 PR PBB SHADOW E&M-EST. PATIENT-LVL III: ICD-10-PCS | Mod: PBBFAC,,, | Performed by: INTERNAL MEDICINE

## 2021-06-17 PROCEDURE — 99999 PR PBB SHADOW E&M-EST. PATIENT-LVL III: CPT | Mod: PBBFAC,,, | Performed by: INTERNAL MEDICINE

## 2021-06-17 PROCEDURE — 99213 OFFICE O/P EST LOW 20 MIN: CPT | Mod: S$GLB,,, | Performed by: PHYSICIAN ASSISTANT

## 2021-06-17 PROCEDURE — 1125F PR PAIN SEVERITY QUANTIFIED, PAIN PRESENT: ICD-10-PCS | Mod: S$GLB,,, | Performed by: PHYSICIAN ASSISTANT

## 2021-06-17 PROCEDURE — 73564 X-RAY EXAM KNEE 4 OR MORE: CPT | Mod: 26,LT,, | Performed by: RADIOLOGY

## 2021-06-17 PROCEDURE — 73502 X-RAY EXAM HIP UNI 2-3 VIEWS: CPT | Mod: TC,FY,LT

## 2021-06-17 PROCEDURE — 73502 X-RAY EXAM HIP UNI 2-3 VIEWS: CPT | Mod: 26,LT,, | Performed by: RADIOLOGY

## 2021-06-17 PROCEDURE — 99999 PR PBB SHADOW E&M-EST. PATIENT-LVL IV: ICD-10-PCS | Mod: PBBFAC,,, | Performed by: PHYSICIAN ASSISTANT

## 2021-06-17 PROCEDURE — 1101F PT FALLS ASSESS-DOCD LE1/YR: CPT | Mod: CPTII,S$GLB,, | Performed by: PHYSICIAN ASSISTANT

## 2021-06-17 PROCEDURE — 1159F PR MEDICATION LIST DOCUMENTED IN MEDICAL RECORD: ICD-10-PCS | Mod: S$GLB,,, | Performed by: PHYSICIAN ASSISTANT

## 2021-06-17 PROCEDURE — 73564 XR KNEE COMP 4 OR MORE VIEWS LEFT: ICD-10-PCS | Mod: 26,LT,, | Performed by: RADIOLOGY

## 2021-06-17 PROCEDURE — 99213 PR OFFICE/OUTPT VISIT, EST, LEVL III, 20-29 MIN: ICD-10-PCS | Mod: S$GLB,,, | Performed by: PHYSICIAN ASSISTANT

## 2021-06-17 PROCEDURE — 1159F MED LIST DOCD IN RCRD: CPT | Mod: S$GLB,,, | Performed by: INTERNAL MEDICINE

## 2021-06-17 PROCEDURE — 99215 PR OFFICE/OUTPT VISIT, EST, LEVL V, 40-54 MIN: ICD-10-PCS | Mod: S$GLB,,, | Performed by: INTERNAL MEDICINE

## 2021-06-17 PROCEDURE — 73564 X-RAY EXAM KNEE 4 OR MORE: CPT | Mod: TC,FY,LT

## 2021-06-17 PROCEDURE — 1125F AMNT PAIN NOTED PAIN PRSNT: CPT | Mod: S$GLB,,, | Performed by: PHYSICIAN ASSISTANT

## 2021-06-17 PROCEDURE — 3288F FALL RISK ASSESSMENT DOCD: CPT | Mod: CPTII,S$GLB,, | Performed by: PHYSICIAN ASSISTANT

## 2021-06-17 PROCEDURE — 1101F PR PT FALLS ASSESS DOC 0-1 FALLS W/OUT INJ PAST YR: ICD-10-PCS | Mod: CPTII,S$GLB,, | Performed by: PHYSICIAN ASSISTANT

## 2021-06-17 PROCEDURE — 3288F PR FALLS RISK ASSESSMENT DOCUMENTED: ICD-10-PCS | Mod: CPTII,S$GLB,, | Performed by: PHYSICIAN ASSISTANT

## 2021-06-17 PROCEDURE — 99999 PR PBB SHADOW E&M-EST. PATIENT-LVL IV: CPT | Mod: PBBFAC,,, | Performed by: PHYSICIAN ASSISTANT

## 2021-07-28 ENCOUNTER — IMMUNIZATION (OUTPATIENT)
Dept: PRIMARY CARE CLINIC | Facility: CLINIC | Age: 81
End: 2021-07-28
Payer: MEDICARE

## 2021-07-28 DIAGNOSIS — Z23 NEED FOR VACCINATION: Primary | ICD-10-CM

## 2021-07-28 PROCEDURE — 91300 COVID-19, MRNA, LNP-S, PF, 30 MCG/0.3 ML DOSE VACCINE: CPT | Mod: PBBFAC | Performed by: INTERNAL MEDICINE

## 2021-08-17 NOTE — PROGRESS NOTES
[facility] NUTRITION SUPPORT-- FOLLOW UP NOTE  --------------------------------------------------------------------------------    24 hour events/subjective: pt seen and examined. oob in chair. c/o some abd pain- no change from yesterday; denies nausea and vomiting; passing gas. per rn pt was on trickle feeds at 30cc/hr and tolerating for ~6hrs but then started 'spitting up,' no actual vomiting of tf formula, and tf stopped/pt deferred further feeds. per rn pt with dedicated port in place for potential tpn. sp mag/phos/potassium supplementation, afebrile, vs reviewed.  and 8/15 bld cxs ngtd.    Diet:  Diet, NPO with Tube Feed:   Tube Feeding Modality: Nasogastric  TwoCal HN (TWOCALHNRTH)  Total Volume for 24 Hours (mL): 240  Continuous  Starting Tube Feed Rate mL per Hour: 10  Until Goal Tube Feed Rate (mL per Hour): 10  Tube Feed Duration (in Hours): 24  Tube Feed Start Time: 13:25 (08-15-21 @ 13:24)      PAST HISTORY  --------------------------------------------------------------------------------  No significant changes to PMH, PSH, FHx, SHx, unless otherwise noted    ALLERGIES & MEDICATIONS  --------------------------------------------------------------------------------  Allergies    No Known Allergies    Intolerances      Standing Inpatient Medications  cefepime   IVPB      cefepime   IVPB 1000 milliGRAM(s) IV Intermittent every 8 hours  chlorhexidine 0.12% Liquid 15 milliLiter(s) Oral Mucosa every 12 hours  chlorhexidine 2% Cloths 1 Application(s) Topical <User Schedule>  clonazePAM  Tablet 0.25 milliGRAM(s) Oral at bedtime  dextrose 5% + sodium chloride 0.45%. 1000 milliLiter(s) IV Continuous <Continuous>  heparin   Injectable 5000 Unit(s) SubCutaneous every 8 hours  metoclopramide Injectable 10 milliGRAM(s) IV Push every 8 hours  mirtazapine 7.5 milliGRAM(s) Oral daily  niCARdipine Infusion 3 mG/Hr IV Continuous <Continuous>  pantoprazole  Injectable 40 milliGRAM(s) IV Push every 12 hours  propranolol 20 milliGRAM(s) Oral every 8 hours  vancomycin    Solution 125 milliGRAM(s) Oral every 6 hours  vancomycin  IVPB 1000 milliGRAM(s) IV Intermittent every 12 hours    PRN Inpatient Medications  acetaminophen    Suspension .. 650 milliGRAM(s) Oral every 6 hours PRN  ondansetron Injectable 4 milliGRAM(s) IV Push every 6 hours PRN        REVIEW OF SYSTEMS  --------------------------------------------------------------------------------    General:  as per hpi  HEENT:  no headaches, no vision changes, no ear pain  CV:  no chest pain, no palpitations  Resp:  no cough  GI:  as per hpi  :  no pain, no bleeding, no dysuria  Muscle:  no pain, no weakness  Neuro:  no numbness, no tingling, no memory problems  Psych:  no insomnia  Endocrine:  no cold/heat intolerance  Heme: no ecchymosis, no easy bruising  Skin:  no rash, no edema    all other systems were reviewed and are negative, except as noted       VITALS/PHYSICAL EXAM  --------------------------------------------------------------------------------  T(C): 36.7 (21 @ 04:00), Max: 36.8 (21 @ 20:00)  HR: 85 (21 @ 06:00) (62 - 116)  BP: --  RR: 24 (21 @ 06:00) (13 - 38)  SpO2: 100% (21 @ 06:00) (81% - 100%)  Wt(kg): --      21 @ 07:01  -  21 @ 07:00  --------------------------------------------------------  IN: 2619.9 mL / OUT: 2200 mL / NET: 419.9 mL      I&O's Detail    16 Aug 2021 07:01  -  17 Aug 2021 07:00  --------------------------------------------------------  IN:    dextrose 5% + sodium chloride 0.45%: 1150 mL    IV PiggyBack: 1194.9 mL    Miscellaneous Tube Feedin mL    NiCARdipine: 75 mL  Total IN: 2619.9 mL    OUT:    Drain (mL): 50 mL    Voided (mL): 2150 mL  Total OUT: 2200 mL    Total NET: 419.9 mL          Physical Exam:  Gen: oob in chair, frail  HEENT: +trach +ngt   Chest: respirations non labored  Abd: soft non distended perc c-tube in place   LE: no edema  Neuro: awake alert responsive      LABS/STUDIES  --------------------------------------------------------------------------------              8.4    8.05  >-----------<  186      [21 @ 00:29]              26.6     137  |  100  |  14  ----------------------------<  155      [21 @ 00:29]  3.7   |  24  |  0.49        Ca     10.1     [21:29]      Mg     1.8     [21:29]      Phos  2.3     [21:29]    TPro  7.8  /  Alb  3.2  /  TBili  0.6  /  DBili  x   /  AST  23  /  ALT  45  /  AlkPhos  140  [21 @ 00:29]              [21 @ 01:09]    Ca ionizedBlood Gas Arterial - Calcium, Ionized: 1.35 mmol/L (21 @ 00:10)  Blood Gas Arterial - Calcium, Ionized: 1.27 mmoL/L (21 @ 01:01)    Creatinine Trend:  POC glucoseGlucose, Serum: 155 mg/dL (21 @ 00:29)  CAPILLARY BLOOD GLUCOSE        PrealbuminPrealbumin, Serum: 11 mg/dL (21 @ 04:01)  Prealbumin, Serum: 10 mg/dL (08-15-21 @ 13:53)    Triglycerides     History & Physical    SUBJECTIVE:       Note dictated with voice recognition software, please excuse any grammatical errors.      Chief Complaint:  Left knee pain    History of Present Illness:  Za Limon is 79 y.o.  female with history of hypertension, hyperlipidemia osteoporosis on Fosamax, GERD, depression, CLL, and AV block on aspirin 81 mg who presents for neurosurgical evaluation, referred by ED.  Patient had syncopal episode and fall on 02/06/2020.  Imaging showed chronic C6-7 anterior osteophyte fracture.  She is here with her daughter for evaluation.  She presented in a cervical collar.  She states she is doing very well overall.  She is having some sinus pressure and postnasal drip.  Initially after fall, she did have some neck pain but that has all resolved.  Denies any current neck pain, radicular arm pain, hand paresthesias, bladder bowel incontinence, loss of finger dexterity, or hand  weakness.  She is also complaining of severe left knee pain and swelling for over 5 years that is affecting her walking; feels like her leg gives out occasionally.  Denies any low back pain, radicular leg pain or paresthesia.                      Review of patient's allergies indicates:   Allergen Reactions    Codeine        Current Outpatient Medications   Medication Sig Dispense Refill    acetaminophen (TYLENOL) 500 MG tablet Take 500 mg by mouth every 6 (six) hours as needed for Pain.      alendronate (FOSAMAX) 70 MG tablet TAKE 1 TABLET BY MOUTH ONE TIME PER WEEK 12 tablet 1    aspirin (ECOTRIN) 81 MG EC tablet Take 81 mg by mouth once daily.      diphenhydrAMINE (BENADRYL) 25 mg capsule Take 25 mg by mouth nightly as needed for Itching.      escitalopram oxalate (LEXAPRO) 10 MG tablet Take 1 tablet (10 mg total) by mouth once daily. 90 tablet 3    guaifenesin/dextromethorphan (ROBITUSSIN-DM ORAL) Take by mouth every evening.      losartan (COZAAR) 100 MG tablet Take 1 tablet (100 mg total) by mouth  once daily. 90 tablet 3    meclizine (ANTIVERT) 25 mg tablet Take 1 tablet (25 mg total) by mouth 3 (three) times daily. Take 3 times daily for 3 days, then nightly for 3 days, then as needed for 3 days 30 tablet 0    omeprazole (PRILOSEC) 20 MG capsule TAKE 1 CAPSULE BY MOUTH EVERY DAY 90 capsule 3    ranitidine (ZANTAC) 150 MG tablet Take 1 tablet (150 mg total) by mouth 2 (two) times daily. 60 tablet 11    rosuvastatin (CRESTOR) 40 MG Tab TAKE 1 TABLET BY MOUTH EVERY DAY IN THE EVENING 90 tablet 3     No current facility-administered medications for this visit.        Past Medical History:   Diagnosis Date    2nd degree AV block 5/6/2016    CLL (chronic lymphocytic leukemia)     Hyperlipidemia     Hypertension      Past Surgical History:   Procedure Laterality Date    HYSTERECTOMY      left leg laser surgery       Family History     Problem Relation (Age of Onset)    Heart disease Sister, Brother    Stroke Father        Social History     Socioeconomic History    Marital status:      Spouse name: Not on file    Number of children: Not on file    Years of education: Not on file    Highest education level: Not on file   Occupational History    Not on file   Social Needs    Financial resource strain: Not on file    Food insecurity:     Worry: Not on file     Inability: Not on file    Transportation needs:     Medical: Not on file     Non-medical: Not on file   Tobacco Use    Smoking status: Never Smoker    Smokeless tobacco: Never Used   Substance and Sexual Activity    Alcohol use: Not on file    Drug use: Not on file    Sexual activity: Not Currently   Lifestyle    Physical activity:     Days per week: Not on file     Minutes per session: Not on file    Stress: Not on file   Relationships    Social connections:     Talks on phone: Not on file     Gets together: Not on file     Attends Mosque service: Not on file     Active member of club or organization: Not on file     Attends  "meetings of clubs or organizations: Not on file     Relationship status: Not on file   Other Topics Concern    Not on file   Social History Narrative    Not on file       Review of Systems:  Review of Systems    Constitutional: no fever, chills or night sweats. No changes in weight   Eyes: no visual changes   ENT: no nasal congestion or sore throat   Respiratory: no cough or shortness of breath   Cardiovascular: no chest pain or palpitations   Gastrointestinal: no nausea or vomiting   Genitourinary: no hematuria or dysuria   Integument/Breast: no rash or pruritis   Hematologic/Lymphatic: no easy bruising or lymphadenopathy   Musculoskeletal: no arthralgias or myalgias.  Positive for left knee pain and swelling.  Neurological: no seizures or tremors. No weakness. No paresthesia.   Behavioral/Psych: no auditory or visual hallucinations   Endocrine: no heat or cold intolerance       OBJECTIVE:     Vital Signs  Pulse: 61  BP: (!) 108/53  Pain Score:   8  Height: 5' 6" (167.6 cm)  Weight: 74.4 kg (164 lb)  Body mass index is 26.47 kg/m².      Physical Exam:  Neurosurgery Physical Exam    General: well developed, well nourished, no distress.   Neurologic: Awake, alert and oriented x3. Thought content appropriate.  GCS: Motor: 6/Verbal: 5/Eyes: 4 GCS Total: 15  Cranial nerves: II-XII grossly intact. PERRLA. EOMI without nystagmus. Face symmetric and sensation intact to light touch, tongue midline, shoulder shrug symmetric bilaterally.  Hearing equal bilaterally to finger rub. Palate and uvula rise and fall normally in midline.    Language: no aphasia  Speech: no dysarthria   Neck: supple, without obvious masses    Sensory: intact to light touch B/L UE and LE  Motor Strength: Moves all extremities spontaneously with good tone. Full strength upper and lower extremities. No abnormal movements seen.    Strength  Deltoids Triceps Biceps Wrist Extension Wrist Flexion Hand    Upper: R 5/5 5/5 5/5 5/5 5/5 5/5    L 5/5 5/5 " 5/5 5/5 5/5 5/5     Iliopsoas Quadriceps Knee  Flexion Tibialis  anterior Gastro- cnemius EHL   Lower: R 5/5 5/5 5/5 5/5 5/5 5/5    L 5/5 5/5 5/5 5/5 5/5 5/5     Interossi muscle strength- 4+/5 bilaterally    DTR's - 2 + and symmetric in UE and LE  Willis's - Negative        Lhermitte's - Negative  Spurlings-   Negative         Ankle Clonus - Negative           Babinski  - Negative     SLR - Negative     Gait - slightly antalgic with left knee  Cervical ROM - full; no pain with all ROM    Lumbar ROM - full; no pain with all ROM    No focal numbness or weakness  No midline or paraspinal tenderness to palpation  No difficulty transitioning from seated to standing position or vice versa.  ENT: normal hearing with finger rub  Heart: RRR, no cyanosis, pallor, or edema.   Lungs- normal respiratory effort  Abdomen-  soft, symmetric and nontender  Skin: grossly intact in all 4 extremities without obvious rashes or lesions  Extremities: warm with no cyanosis or edema, or clubbing  Pulses: palpable distal pulses    Tenderness to palpation of entire left knee.  Severely tender at medial aspect of left knee.  Pain with all left knee ROM.  There is significant edema of left knee compared to the right.    Posture-  No obvious kyphosis with standing posture.  With bending posture, no obvious scapula wing        Diagnostic Results:  All imaging personally reviewed      Cervical x-rays 02/27/2020  Straightening of cervical lordosis.  Anterior osteophyte formation throughout.  No spondylolisthesis or dynamic instability.    CT head 02/21/2020  No acute finding    CT cervical spine 02/21/2020  Straightening of cervical lordosis.  Mild spondylosis throughout.  Small anterior osteophyte fracture at C6-7 with no significant displacement.    MRI cervical spine 02/21/2020  Straightening of cervical lordosis.  Multilevel cervical spondylosis and degenerative disc disease.  C4-5 disc osteophyte complex and uncovertebral spurring causing  [facility] NUTRITION SUPPORT-- FOLLOW UP NOTE  --------------------------------------------------------------------------------    24 hour events/subjective: pt seen and examined. oob in chair. c/o some abd pain- no change from yesterday; denies nausea and vomiting; passing gas. per rn pt was on trickle feeds at 30cc/hr and tolerating for ~6hrs but then started 'spitting up,' no actual vomiting of tf formula, and tf stopped/pt deferred further feeds. per rn pt with dedicated port in place for potential tpn. sp mag/phos/potassium supplementation, afebrile, bps noted.  and 8/15 bld cxs ngtd.    Diet:  Diet, NPO with Tube Feed:   Tube Feeding Modality: Nasogastric  TwoCal HN (TWOCALHNRTH)  Total Volume for 24 Hours (mL): 240  Continuous  Starting Tube Feed Rate mL per Hour: 10  Until Goal Tube Feed Rate (mL per Hour): 10  Tube Feed Duration (in Hours): 24  Tube Feed Start Time: 13:25 (08-15-21 @ 13:24)      PAST HISTORY  --------------------------------------------------------------------------------  No significant changes to PMH, PSH, FHx, SHx, unless otherwise noted    ALLERGIES & MEDICATIONS  --------------------------------------------------------------------------------  Allergies    No Known Allergies    Intolerances      Standing Inpatient Medications  cefepime   IVPB      cefepime   IVPB 1000 milliGRAM(s) IV Intermittent every 8 hours  chlorhexidine 0.12% Liquid 15 milliLiter(s) Oral Mucosa every 12 hours  chlorhexidine 2% Cloths 1 Application(s) Topical <User Schedule>  clonazePAM  Tablet 0.25 milliGRAM(s) Oral at bedtime  dextrose 5% + sodium chloride 0.45%. 1000 milliLiter(s) IV Continuous <Continuous>  heparin   Injectable 5000 Unit(s) SubCutaneous every 8 hours  metoclopramide Injectable 10 milliGRAM(s) IV Push every 8 hours  mirtazapine 7.5 milliGRAM(s) Oral daily  niCARdipine Infusion 3 mG/Hr IV Continuous <Continuous>  pantoprazole  Injectable 40 milliGRAM(s) IV Push every 12 hours  propranolol 20 milliGRAM(s) Oral every 8 hours  vancomycin    Solution 125 milliGRAM(s) Oral every 6 hours  vancomycin  IVPB 1000 milliGRAM(s) IV Intermittent every 12 hours    PRN Inpatient Medications  acetaminophen    Suspension .. 650 milliGRAM(s) Oral every 6 hours PRN  ondansetron Injectable 4 milliGRAM(s) IV Push every 6 hours PRN        REVIEW OF SYSTEMS  --------------------------------------------------------------------------------    General:  as per hpi  HEENT:  no headaches, no vision changes, no ear pain  CV:  no chest pain, no palpitations  Resp:  no cough  GI:  as per hpi  :  no pain, no bleeding, no dysuria  Muscle:  no pain, no weakness  Neuro:  no numbness, no tingling, no memory problems  Psych:  no insomnia  Endocrine:  no cold/heat intolerance  Heme: no ecchymosis, no easy bruising  Skin:  no rash, no edema    all other systems were reviewed and are negative, except as noted       VITALS/PHYSICAL EXAM  --------------------------------------------------------------------------------  T(C): 36.7 (21 @ 04:00), Max: 36.8 (21 @ 20:00)  HR: 85 (21 @ 06:00) (62 - 116)  BP: --  RR: 24 (21 @ 06:00) (13 - 38)  SpO2: 100% (21 @ 06:00) (81% - 100%)  Wt(kg): --      21 @ 07:01  -  21 @ 07:00  --------------------------------------------------------  IN: 2619.9 mL / OUT: 2200 mL / NET: 419.9 mL      I&O's Detail    16 Aug 2021 07:01  -  17 Aug 2021 07:00  --------------------------------------------------------  IN:    dextrose 5% + sodium chloride 0.45%: 1150 mL    IV PiggyBack: 1194.9 mL    Miscellaneous Tube Feedin mL    NiCARdipine: 75 mL  Total IN: 2619.9 mL    OUT:    Drain (mL): 50 mL    Voided (mL): 2150 mL  Total OUT: 2200 mL    Total NET: 419.9 mL          Physical Exam:  Gen: oob in chair, frail  HEENT: +trach +ngt   Chest: respirations non labored  Abd: soft non distended perc c-tube in place   LE: no edema  Neuro: awake alert responsive      LABS/STUDIES  --------------------------------------------------------------------------------              8.4    8.05  >-----------<  186      [21 @ 00:29]              26.6     137  |  100  |  14  ----------------------------<  155      [21 @ 00:29]  3.7   |  24  |  0.49        Ca     10.1     [21:29]      Mg     1.8     [21:29]      Phos  2.3     [21:29]    TPro  7.8  /  Alb  3.2  /  TBili  0.6  /  DBili  x   /  AST  23  /  ALT  45  /  AlkPhos  140  [21 @ 00:29]              [21 @ 01:09]    Ca ionizedBlood Gas Arterial - Calcium, Ionized: 1.35 mmol/L (21 @ 00:10)  Blood Gas Arterial - Calcium, Ionized: 1.27 mmoL/L (21 @ 01:01)    Creatinine Trend:  POC glucoseGlucose, Serum: 155 mg/dL (21 @ 00:29)  CAPILLARY BLOOD GLUCOSE        PrealbuminPrealbumin, Serum: 11 mg/dL (21 @ 04:01)  Prealbumin, Serum: 10 mg/dL (08-15-21 @ 13:53)    Triglycerides     [facility] NUTRITION SUPPORT-- FOLLOW UP NOTE  --------------------------------------------------------------------------------    24 hour events/subjective: pt seen and examined. oob in chair. c/o some abd pain- no change from yesterday; denies nausea and vomiting; passing gas. per rn pt was on trickle feeds at 30cc/hr and tolerating for ~6hrs but then started 'spitting up,' no actual vomiting of tf formula, and tf stopped/pt deferred further feeds. per rn pt with dedicated port in place for potential tpn if needed in future. sp mag/phos/potassium supplementation, afebrile, bps noted.  and 8/15 bld cxs ngtd.    Diet:  Diet, NPO with Tube Feed:   Tube Feeding Modality: Nasogastric  TwoCal HN (TWOCALHNRTH)  Total Volume for 24 Hours (mL): 240  Continuous  Starting Tube Feed Rate mL per Hour: 10  Until Goal Tube Feed Rate (mL per Hour): 10  Tube Feed Duration (in Hours): 24  Tube Feed Start Time: 13:25 (08-15-21 @ 13:24)      PAST HISTORY  --------------------------------------------------------------------------------  No significant changes to PMH, PSH, FHx, SHx, unless otherwise noted    ALLERGIES & MEDICATIONS  --------------------------------------------------------------------------------  Allergies    No Known Allergies    Intolerances      Standing Inpatient Medications  cefepime   IVPB      cefepime   IVPB 1000 milliGRAM(s) IV Intermittent every 8 hours  chlorhexidine 0.12% Liquid 15 milliLiter(s) Oral Mucosa every 12 hours  chlorhexidine 2% Cloths 1 Application(s) Topical <User Schedule>  clonazePAM  Tablet 0.25 milliGRAM(s) Oral at bedtime  dextrose 5% + sodium chloride 0.45%. 1000 milliLiter(s) IV Continuous <Continuous>  heparin   Injectable 5000 Unit(s) SubCutaneous every 8 hours  metoclopramide Injectable 10 milliGRAM(s) IV Push every 8 hours  mirtazapine 7.5 milliGRAM(s) Oral daily  niCARdipine Infusion 3 mG/Hr IV Continuous <Continuous>  pantoprazole  Injectable 40 milliGRAM(s) IV Push every 12 hours  propranolol 20 milliGRAM(s) Oral every 8 hours  vancomycin    Solution 125 milliGRAM(s) Oral every 6 hours  vancomycin  IVPB 1000 milliGRAM(s) IV Intermittent every 12 hours    PRN Inpatient Medications  acetaminophen    Suspension .. 650 milliGRAM(s) Oral every 6 hours PRN  ondansetron Injectable 4 milliGRAM(s) IV Push every 6 hours PRN        REVIEW OF SYSTEMS  --------------------------------------------------------------------------------    General:  as per hpi  HEENT:  no headaches, no vision changes, no ear pain  CV:  no chest pain, no palpitations  Resp:  no cough  GI:  as per hpi  :  no pain, no bleeding, no dysuria  Muscle:  no pain, no weakness  Neuro:  no numbness, no tingling, no memory problems  Psych:  no insomnia  Endocrine:  no cold/heat intolerance  Heme: no ecchymosis, no easy bruising  Skin:  no rash, no edema    all other systems were reviewed and are negative, except as noted       VITALS/PHYSICAL EXAM  --------------------------------------------------------------------------------  T(C): 36.7 (21 @ 04:00), Max: 36.8 (21 @ 20:00)  HR: 85 (21 @ 06:00) (62 - 116)  BP: --  RR: 24 (21 @ 06:00) (13 - 38)  SpO2: 100% (21 @ 06:00) (81% - 100%)  Wt(kg): --      21 @ 07:01  -  21 @ 07:00  --------------------------------------------------------  IN: 2619.9 mL / OUT: 2200 mL / NET: 419.9 mL      I&O's Detail    16 Aug 2021 07:01  -  17 Aug 2021 07:00  --------------------------------------------------------  IN:    dextrose 5% + sodium chloride 0.45%: 1150 mL    IV PiggyBack: 1194.9 mL    Miscellaneous Tube Feedin mL    NiCARdipine: 75 mL  Total IN: 2619.9 mL    OUT:    Drain (mL): 50 mL    Voided (mL): 2150 mL  Total OUT: 2200 mL    Total NET: 419.9 mL          Physical Exam:  Gen: oob in chair, frail  HEENT: +trach +ngt   Chest: respirations non labored  Abd: soft non distended perc c-tube in place   LE: no edema  Neuro: awake alert responsive      LABS/STUDIES  --------------------------------------------------------------------------------              8.4    8.05  >-----------<  186      [21 @ 00:29]              26.6     137  |  100  |  14  ----------------------------<  155      [21:29]  3.7   |  24  |  0.49        Ca     10.1     [21:]      Mg     1.8     [21:]      Phos  2.3     [21:]    TPro  7.8  /  Alb  3.2  /  TBili  0.6  /  DBili  x   /  AST  23  /  ALT  45  /  AlkPhos  140  [21 @ 00:29]              [21 @ 01:09]    Ca ionizedBlood Gas Arterial - Calcium, Ionized: 1.35 mmol/L (21 @ 00:10)  Blood Gas Arterial - Calcium, Ionized: 1.27 mmoL/L (21 @ 01:01)    Creatinine Trend:  POC glucoseGlucose, Serum: 155 mg/dL (21 00:29)  CAPILLARY BLOOD GLUCOSE        PrealbuminPrealbumin, Serum: 11 mg/dL (21 @ 04:01)  Prealbumin, Serum: 10 mg/dL (08-15-21 @ 13:53)    Triglycerides     severe right neural foraminal stenosis.  No significant central canal stenosis throughout.  No STIR signal change at C6-7 anterior osteophyte fracture site      DEXA bone scan 01/20/2019  Osteoporosis bilateral femoral necks.  Lumbar vertebral bone mineral density T score of 2.8      ASSESSMENT/PLAN:     79-year-old female with multiple medical comorbidities including osteoporosis who presents for neurosurgical evaluation after sustaining a fall secondary to syncopal episode.  She has known history of heart block and is under consideration for pacemaker.    -patient is neurologically intact on exam. there is no acute neurosurgical intervention for mild chronic anterior osteophyte fracture.  -patient may dc cervical collar p.r.n..  -will obtain knee x-rays referral to Orthopedics for evaluation.  -no further follow-up with Neurosurgery needed.        All imaging were reviewed with patient and daughter. All questions were answered.  Patient verbalized understanding and agreed with the above plan of care.  Patient was encouraged to call clinic with any future concerns.    Please call with any questions.        EDILIA Sanchez Neurosurgery      Note dictated with voice recognition software, please excuse any grammatical errors.

## 2021-08-18 ENCOUNTER — IMMUNIZATION (OUTPATIENT)
Dept: PRIMARY CARE CLINIC | Facility: CLINIC | Age: 81
End: 2021-08-18
Payer: MEDICARE

## 2021-08-18 DIAGNOSIS — Z23 NEED FOR VACCINATION: Primary | ICD-10-CM

## 2021-08-18 PROCEDURE — 0002A COVID-19, MRNA, LNP-S, PF, 30 MCG/0.3 ML DOSE VACCINE: CPT | Mod: CV19,PBBFAC | Performed by: INTERNAL MEDICINE

## 2021-08-18 PROCEDURE — 91300 COVID-19, MRNA, LNP-S, PF, 30 MCG/0.3 ML DOSE VACCINE: CPT | Mod: PBBFAC | Performed by: INTERNAL MEDICINE

## 2021-10-03 NOTE — PLAN OF CARE
Medicare Important Message form explained to patient. Form signed by patient. Copy provided and copy placed in chart.       02/24/20 1109   Medicare Message   Important Message from Medicare regarding Discharge Appeal Rights Given to patient/caregiver;Explained to patient/caregiver;Signed/date by patient/caregiver   Date IMM was signed 02/24/20   Time IMM was signed 1106      no

## 2021-11-15 ENCOUNTER — TELEPHONE (OUTPATIENT)
Dept: INTERNAL MEDICINE | Facility: CLINIC | Age: 81
End: 2021-11-15
Payer: MEDICARE

## 2021-11-29 ENCOUNTER — LAB VISIT (OUTPATIENT)
Dept: LAB | Facility: HOSPITAL | Age: 81
End: 2021-11-29
Attending: INTERNAL MEDICINE
Payer: MEDICARE

## 2021-11-29 ENCOUNTER — OFFICE VISIT (OUTPATIENT)
Dept: INTERNAL MEDICINE | Facility: CLINIC | Age: 81
End: 2021-11-29
Payer: MEDICARE

## 2021-11-29 VITALS
BODY MASS INDEX: 27.3 KG/M2 | HEIGHT: 68 IN | DIASTOLIC BLOOD PRESSURE: 90 MMHG | SYSTOLIC BLOOD PRESSURE: 170 MMHG | HEART RATE: 96 BPM | WEIGHT: 180.13 LBS | OXYGEN SATURATION: 95 %

## 2021-11-29 DIAGNOSIS — E78.00 PURE HYPERCHOLESTEROLEMIA: ICD-10-CM

## 2021-11-29 DIAGNOSIS — E46 PROTEIN-CALORIE MALNUTRITION, UNSPECIFIED SEVERITY: ICD-10-CM

## 2021-11-29 DIAGNOSIS — I47.10 PSVT (PAROXYSMAL SUPRAVENTRICULAR TACHYCARDIA): ICD-10-CM

## 2021-11-29 DIAGNOSIS — C91.10 CLL (CHRONIC LYMPHOCYTIC LEUKEMIA): ICD-10-CM

## 2021-11-29 DIAGNOSIS — I10 ESSENTIAL HYPERTENSION: ICD-10-CM

## 2021-11-29 DIAGNOSIS — I10 ESSENTIAL HYPERTENSION: Primary | ICD-10-CM

## 2021-11-29 DIAGNOSIS — Z23 NEED FOR VACCINATION: ICD-10-CM

## 2021-11-29 DIAGNOSIS — N18.31 STAGE 3A CHRONIC KIDNEY DISEASE: ICD-10-CM

## 2021-11-29 PROBLEM — R55 SYNCOPE: Status: RESOLVED | Noted: 2020-02-21 | Resolved: 2021-11-29

## 2021-11-29 PROCEDURE — 99499 RISK ADDL DX/OHS AUDIT: ICD-10-PCS | Mod: S$GLB,,, | Performed by: INTERNAL MEDICINE

## 2021-11-29 PROCEDURE — 99214 PR OFFICE/OUTPT VISIT, EST, LEVL IV, 30-39 MIN: ICD-10-PCS | Mod: S$GLB,,, | Performed by: INTERNAL MEDICINE

## 2021-11-29 PROCEDURE — 80076 HEPATIC FUNCTION PANEL: CPT | Performed by: INTERNAL MEDICINE

## 2021-11-29 PROCEDURE — 99999 PR PBB SHADOW E&M-EST. PATIENT-LVL III: ICD-10-PCS | Mod: PBBFAC,,, | Performed by: INTERNAL MEDICINE

## 2021-11-29 PROCEDURE — 84443 ASSAY THYROID STIM HORMONE: CPT | Performed by: INTERNAL MEDICINE

## 2021-11-29 PROCEDURE — 99999 PR PBB SHADOW E&M-EST. PATIENT-LVL III: CPT | Mod: PBBFAC,,, | Performed by: INTERNAL MEDICINE

## 2021-11-29 PROCEDURE — 85027 COMPLETE CBC AUTOMATED: CPT | Performed by: INTERNAL MEDICINE

## 2021-11-29 PROCEDURE — 82607 VITAMIN B-12: CPT | Performed by: INTERNAL MEDICINE

## 2021-11-29 PROCEDURE — 82728 ASSAY OF FERRITIN: CPT | Performed by: INTERNAL MEDICINE

## 2021-11-29 PROCEDURE — 85060 BLOOD SMEAR INTERPRETATION: CPT | Mod: ,,, | Performed by: PATHOLOGY

## 2021-11-29 PROCEDURE — 80048 BASIC METABOLIC PNL TOTAL CA: CPT | Performed by: INTERNAL MEDICINE

## 2021-11-29 PROCEDURE — 36415 COLL VENOUS BLD VENIPUNCTURE: CPT | Mod: PO | Performed by: INTERNAL MEDICINE

## 2021-11-29 PROCEDURE — 87389 HIV-1 AG W/HIV-1&-2 AB AG IA: CPT | Performed by: INTERNAL MEDICINE

## 2021-11-29 PROCEDURE — 85007 BL SMEAR W/DIFF WBC COUNT: CPT | Performed by: INTERNAL MEDICINE

## 2021-11-29 PROCEDURE — 83970 ASSAY OF PARATHORMONE: CPT | Performed by: INTERNAL MEDICINE

## 2021-11-29 PROCEDURE — 83921 ORGANIC ACID SINGLE QUANT: CPT | Performed by: INTERNAL MEDICINE

## 2021-11-29 PROCEDURE — 85060 PATHOLOGIST REVIEW: ICD-10-PCS | Mod: ,,, | Performed by: PATHOLOGY

## 2021-11-29 PROCEDURE — 99499 UNLISTED E&M SERVICE: CPT | Mod: S$GLB,,, | Performed by: INTERNAL MEDICINE

## 2021-11-29 PROCEDURE — 99214 OFFICE O/P EST MOD 30 MIN: CPT | Mod: S$GLB,,, | Performed by: INTERNAL MEDICINE

## 2021-11-29 PROCEDURE — 82306 VITAMIN D 25 HYDROXY: CPT | Performed by: INTERNAL MEDICINE

## 2021-11-29 PROCEDURE — 84100 ASSAY OF PHOSPHORUS: CPT | Performed by: INTERNAL MEDICINE

## 2021-11-29 RX ORDER — AMLODIPINE BESYLATE 10 MG/1
10 TABLET ORAL DAILY
Qty: 90 TABLET | Refills: 0 | Status: SHIPPED | OUTPATIENT
Start: 2021-11-29 | End: 2021-12-14

## 2021-11-30 LAB
25(OH)D3+25(OH)D2 SERPL-MCNC: 49 NG/ML (ref 30–96)
ALBUMIN SERPL BCP-MCNC: 4.2 G/DL (ref 3.5–5.2)
ALP SERPL-CCNC: 65 U/L (ref 55–135)
ALT SERPL W/O P-5'-P-CCNC: 11 U/L (ref 10–44)
ANION GAP SERPL CALC-SCNC: 10 MMOL/L (ref 8–16)
ANISOCYTOSIS BLD QL SMEAR: SLIGHT
AST SERPL-CCNC: 23 U/L (ref 10–40)
BASOPHILS # BLD AUTO: ABNORMAL K/UL (ref 0–0.2)
BASOPHILS NFR BLD: 0.7 % (ref 0–1.9)
BILIRUB DIRECT SERPL-MCNC: 0.2 MG/DL (ref 0.1–0.3)
BILIRUB SERPL-MCNC: 0.5 MG/DL (ref 0.1–1)
BUN SERPL-MCNC: 29 MG/DL (ref 8–23)
BURR CELLS BLD QL SMEAR: ABNORMAL
CALCIUM SERPL-MCNC: 9.6 MG/DL (ref 8.7–10.5)
CHLORIDE SERPL-SCNC: 107 MMOL/L (ref 95–110)
CO2 SERPL-SCNC: 20 MMOL/L (ref 23–29)
CREAT SERPL-MCNC: 1.2 MG/DL (ref 0.5–1.4)
DIFFERENTIAL METHOD: ABNORMAL
EOSINOPHIL # BLD AUTO: ABNORMAL K/UL (ref 0–0.5)
EOSINOPHIL NFR BLD: 0.7 % (ref 0–8)
ERYTHROCYTE [DISTWIDTH] IN BLOOD BY AUTOMATED COUNT: 13.9 % (ref 11.5–14.5)
EST. GFR  (AFRICAN AMERICAN): 48.6 ML/MIN/1.73 M^2
EST. GFR  (NON AFRICAN AMERICAN): 42.2 ML/MIN/1.73 M^2
FERRITIN SERPL-MCNC: 43 NG/ML (ref 20–300)
GLUCOSE SERPL-MCNC: 101 MG/DL (ref 70–110)
HCT VFR BLD AUTO: 32.8 % (ref 37–48.5)
HGB BLD-MCNC: 10.5 G/DL (ref 12–16)
HIV 1+2 AB+HIV1 P24 AG SERPL QL IA: NEGATIVE
IMM GRANULOCYTES # BLD AUTO: ABNORMAL K/UL (ref 0–0.04)
IMM GRANULOCYTES NFR BLD AUTO: ABNORMAL % (ref 0–0.5)
LYMPHOCYTES # BLD AUTO: ABNORMAL K/UL (ref 1–4.8)
LYMPHOCYTES NFR BLD: 67.3 % (ref 18–48)
MCH RBC QN AUTO: 30.8 PG (ref 27–31)
MCHC RBC AUTO-ENTMCNC: 32 G/DL (ref 32–36)
MCV RBC AUTO: 96 FL (ref 82–98)
MONOCYTES # BLD AUTO: ABNORMAL K/UL (ref 0.3–1)
MONOCYTES NFR BLD: 2.7 % (ref 4–15)
NEUTROPHILS NFR BLD: 28.6 % (ref 38–73)
NRBC BLD-RTO: 0 /100 WBC
OVALOCYTES BLD QL SMEAR: ABNORMAL
PATH REV BLD -IMP: NORMAL
PHOSPHATE SERPL-MCNC: 4 MG/DL (ref 2.7–4.5)
PLATELET # BLD AUTO: 215 K/UL (ref 150–450)
PLATELET BLD QL SMEAR: ABNORMAL
PMV BLD AUTO: 12.6 FL (ref 9.2–12.9)
POIKILOCYTOSIS BLD QL SMEAR: SLIGHT
POTASSIUM SERPL-SCNC: 4.6 MMOL/L (ref 3.5–5.1)
PROT SERPL-MCNC: 7.1 G/DL (ref 6–8.4)
PTH-INTACT SERPL-MCNC: 138.4 PG/ML (ref 9–77)
RBC # BLD AUTO: 3.41 M/UL (ref 4–5.4)
SODIUM SERPL-SCNC: 137 MMOL/L (ref 136–145)
TSH SERPL DL<=0.005 MIU/L-ACNC: 3.26 UIU/ML (ref 0.4–4)
VIT B12 SERPL-MCNC: 935 PG/ML (ref 210–950)
WBC # BLD AUTO: 15.64 K/UL (ref 3.9–12.7)

## 2021-12-03 LAB — METHYLMALONATE SERPL-SCNC: 0.29 UMOL/L

## 2021-12-14 ENCOUNTER — OFFICE VISIT (OUTPATIENT)
Dept: INTERNAL MEDICINE | Facility: CLINIC | Age: 81
End: 2021-12-14
Payer: MEDICARE

## 2021-12-14 VITALS
DIASTOLIC BLOOD PRESSURE: 50 MMHG | WEIGHT: 175.06 LBS | HEIGHT: 68 IN | BODY MASS INDEX: 26.53 KG/M2 | OXYGEN SATURATION: 98 % | HEART RATE: 88 BPM | SYSTOLIC BLOOD PRESSURE: 130 MMHG

## 2021-12-14 DIAGNOSIS — C91.10 CLL (CHRONIC LYMPHOCYTIC LEUKEMIA): ICD-10-CM

## 2021-12-14 DIAGNOSIS — I44.0 FIRST DEGREE AV BLOCK: ICD-10-CM

## 2021-12-14 DIAGNOSIS — K59.00 CONSTIPATION, UNSPECIFIED CONSTIPATION TYPE: ICD-10-CM

## 2021-12-14 DIAGNOSIS — Z86.79 HISTORY OF PSVT (PAROXYSMAL SUPRAVENTRICULAR TACHYCARDIA): ICD-10-CM

## 2021-12-14 DIAGNOSIS — I10 ESSENTIAL HYPERTENSION: Primary | ICD-10-CM

## 2021-12-14 DIAGNOSIS — M81.0 OSTEOPOROSIS OF FEMUR WITHOUT PATHOLOGICAL FRACTURE: ICD-10-CM

## 2021-12-14 DIAGNOSIS — I47.10 PSVT (PAROXYSMAL SUPRAVENTRICULAR TACHYCARDIA): ICD-10-CM

## 2021-12-14 DIAGNOSIS — Z12.11 SCREENING FOR COLON CANCER: ICD-10-CM

## 2021-12-14 PROBLEM — I49.1 PREMATURE ATRIAL CONTRACTIONS: Status: RESOLVED | Noted: 2017-01-24 | Resolved: 2021-12-14

## 2021-12-14 PROBLEM — R94.31 NONSPECIFIC ABNORMAL ELECTROCARDIOGRAM (ECG) (EKG): Status: RESOLVED | Noted: 2019-12-19 | Resolved: 2021-12-14

## 2021-12-14 PROBLEM — I95.1 ORTHOSTATIC HYPOTENSION: Status: RESOLVED | Noted: 2017-01-24 | Resolved: 2021-12-14

## 2021-12-14 PROBLEM — I49.3 PVC (PREMATURE VENTRICULAR CONTRACTION): Status: RESOLVED | Noted: 2020-03-09 | Resolved: 2021-12-14

## 2021-12-14 PROCEDURE — 99214 OFFICE O/P EST MOD 30 MIN: CPT | Mod: S$GLB,,, | Performed by: INTERNAL MEDICINE

## 2021-12-14 PROCEDURE — 99999 PR PBB SHADOW E&M-EST. PATIENT-LVL III: CPT | Mod: PBBFAC,,, | Performed by: INTERNAL MEDICINE

## 2021-12-14 PROCEDURE — 99214 PR OFFICE/OUTPT VISIT, EST, LEVL IV, 30-39 MIN: ICD-10-PCS | Mod: S$GLB,,, | Performed by: INTERNAL MEDICINE

## 2021-12-14 PROCEDURE — 99999 PR PBB SHADOW E&M-EST. PATIENT-LVL III: ICD-10-PCS | Mod: PBBFAC,,, | Performed by: INTERNAL MEDICINE

## 2021-12-14 RX ORDER — AMLODIPINE BESYLATE 5 MG/1
5 TABLET ORAL DAILY
Qty: 90 TABLET | Refills: 1 | Status: SHIPPED | OUTPATIENT
Start: 2021-12-14 | End: 2022-03-23

## 2021-12-14 RX ORDER — POLYETHYLENE GLYCOL 3350 17 G/17G
17 POWDER, FOR SOLUTION ORAL DAILY
Qty: 1 EACH | Refills: 0 | Status: SHIPPED | OUTPATIENT
Start: 2021-12-14

## 2022-02-19 DIAGNOSIS — I10 ESSENTIAL HYPERTENSION: ICD-10-CM

## 2022-02-19 NOTE — TELEPHONE ENCOUNTER
No new care gaps identified.  Powered by [employer]. Reference number: [id number].   2/19/2022 7:53:23 AM CST

## 2022-02-21 RX ORDER — AMLODIPINE BESYLATE 10 MG/1
TABLET ORAL
Qty: 90 TABLET | Refills: 0 | OUTPATIENT
Start: 2022-02-21

## 2022-02-21 NOTE — TELEPHONE ENCOUNTER
Quick DC. Inappropriate Request    Refill Authorization Note   Za Limon  is requesting a refill authorization.  Brief Assessment and Rationale for Refill:  Quick Discontinue  Medication Therapy Plan:       Medication Reconciliation Completed:  No      Comments:   Pended Medication(s)       Requested Prescriptions     Refused Prescriptions Disp Refills    amLODIPine (NORVASC) 10 MG tablet [Pharmacy Med Name: AMLODIPINE BESYLATE 10 MG TAB] 90 tablet 0     Sig: TAKE 1 TABLET BY MOUTH EVERY DAY     Refused By: CORA VEGAS     Reason for Refusal: Refill not appropriate        Duplicate Pended Encounter(s)/ Last Prescribed Details: (includes pharmacy & prescriber details)   amLODIPine (NORVASC) 10 MG tablet [[id number]]  DISCONTINUED    Order Details  Dose: 10 mg Route: Oral Frequency: Daily   Dispense Quantity: 90 tablet Refills: 0    Note to Pharmacy: .         Sig: Take 1 tablet (10 mg total) by mouth once daily.         Start Date: 11/29/21 End Date: 12/14/21 (ordered for 365 doses)   Discontinued by: Lamin Jiang III, MD on 12/14/2021 09:34         Written Date: 11/29/21 Expiration Date: 11/29/22       Diagnosis Association: Essential hypertension (I10)                Note composed:12:59 PM 02/21/2022

## 2022-02-22 DIAGNOSIS — D84.9 IMMUNOSUPPRESSED STATUS: ICD-10-CM

## 2022-03-02 DIAGNOSIS — E78.2 MIXED HYPERLIPIDEMIA: ICD-10-CM

## 2022-03-02 DIAGNOSIS — M81.0 OSTEOPOROSIS, UNSPECIFIED OSTEOPOROSIS TYPE, UNSPECIFIED PATHOLOGICAL FRACTURE PRESENCE: ICD-10-CM

## 2022-03-02 RX ORDER — ROSUVASTATIN CALCIUM 40 MG/1
40 TABLET, COATED ORAL NIGHTLY
Qty: 90 TABLET | Refills: 1 | Status: SHIPPED | OUTPATIENT
Start: 2022-03-02 | End: 2022-10-27 | Stop reason: SDUPTHER

## 2022-03-02 RX ORDER — ALENDRONATE SODIUM 70 MG/1
70 TABLET ORAL WEEKLY
Qty: 12 TABLET | Refills: 1 | Status: SHIPPED | OUTPATIENT
Start: 2022-03-02 | End: 2022-10-27

## 2022-03-02 RX ORDER — LOSARTAN POTASSIUM 100 MG/1
100 TABLET ORAL DAILY
Qty: 90 TABLET | Refills: 1 | Status: SHIPPED | OUTPATIENT
Start: 2022-03-02 | End: 2022-10-27 | Stop reason: SDUPTHER

## 2022-03-02 NOTE — TELEPHONE ENCOUNTER
Care Due:                  Date            Visit Type   Department     Provider  --------------------------------------------------------------------------------                                SAME DAY -                              ESTABLISHED   [facility] INTERNAL  Last Visit: 12-      PATIENT      MEDICINE       Lamin Jiang                              EP -                              PRIMARY      [facility] INTERNAL  Next Visit: 03-      CARE ([facility])   MEDICINE       Lamin Jiang                                                            Last  Test          Frequency    Reason                     Performed    Due Date  --------------------------------------------------------------------------------    Lipid Panel.  12 months..  rosuvastatin.............  05- 05-    Powered by [employer]. Reference number: [id number].   3/02/2022 10:06:09 AM CST

## 2022-03-02 NOTE — TELEPHONE ENCOUNTER
----- Message from Stacie Melendez sent at 3/2/2022  9:07 AM CST -----  Contact: [phone]  Type:  RX Refill Request    Who Called:  pt    Refill or New Rx: refill    RX Name and Strength:   rosuvastatin (CRESTOR) 40 MG Tab  losartan (COZAAR) 100 MG tablet  alendronate (FOSAMAX) 70 MG tablet    How is the patient currently taking it? (ex. 1XDay):    Is this a 30 day or 90 day RX: 90 days     Preferred Pharmacy with phone number: [facility] [phone]    Local or Mail Order: local     Ordering Provider: [email or web address]     Would the patient rather a call back or a response via [facility]?  Call back     Best Call Back Number: [phone]  Additional Information:

## 2022-03-11 ENCOUNTER — PES CALL (OUTPATIENT)
Dept: ADMINISTRATIVE | Facility: CLINIC | Age: 82
End: 2022-03-11
Payer: MEDICARE

## 2022-03-22 PROBLEM — N18.31 STAGE 3A CHRONIC KIDNEY DISEASE: Status: ACTIVE | Noted: 2022-03-22

## 2022-03-22 PROBLEM — I77.9 DISORDER OF ARTERIES AND ARTERIOLES, UNSPECIFIED: Status: ACTIVE | Noted: 2022-03-22

## 2022-03-22 NOTE — PATIENT INSTRUCTIONS
We were happy to see you today    For your Testing  Please have your labs and/or imaging test done at your earliest convience      For your Medication   We stopped the amlodipine , please continue to track your blood pressure   For more information about side effects please visit medlineplus.gov      For your Referrals  Heme/onc        Please return to clinic in    6 months with prelabs

## 2022-03-22 NOTE — PROGRESS NOTES
Subjective:       Patient ID: Za Limon is a 81 y.o. female.    Chief Complaint:   Hypertension      Hypertension  Pertinent negatives include no chest pain, palpitations or shortness of breath.           #Interim Hx    Patient reports that she started the amlodipine , had one episode of dizziness, lightheaded. After increasing to the 10mg daily. No recurrent episdoes . Stopped altogether     #Concerns Today    F/u     #Chronic Problems        HTN  Dx: age 49  Complication: no CVA/CKD/CAD  Last labs :   - BMP  Lab Results   Component Value Date     11/29/2021    K 4.6 11/29/2021     11/29/2021    CO2 20 (L) 11/29/2021    BUN 29 (H) 11/29/2021    CREATININE 1.2 11/29/2021    CALCIUM 9.6 11/29/2021    ANIONGAP 10 11/29/2021    ESTGFRAFRICA 48.6 (A) 11/29/2021    EGFRNONAA 42.2 (A) 11/29/2021     Medication: adherent to   - losartan 100mg daily   -off amlodipine ALTOGETHER STOPPED  Home Bps;  Doesn't have bp cuff   Symptoms: denies CP, SOB, changes in urination, sudden weakness, numbness          CKD 3  Etiology: HTN,  Providers: not followed by nephrology   Labs/imaging:  - GFR     - UA:   - ADRY; 2/2020 ; CMRD  - SPEP/UPEP; declined w/u with heme onc  BMP  Lab Results   Component Value Date     11/29/2021    K 4.6 11/29/2021     11/29/2021    CO2 20 (L) 11/29/2021    BUN 29 (H) 11/29/2021    CREATININE 1.2 11/29/2021    CALCIUM 9.6 11/29/2021    ANIONGAP 10 11/29/2021    ESTGFRAFRICA 48.6 (A) 11/29/2021    EGFRNONAA 42.2 (A) 11/29/2021        Lab Results   Component Value Date    .4 (H) 11/29/2021    CALCIUM 9.6 11/29/2021    PHOS 4.0 11/29/2021     Lab Results   Component Value Date    WBC 15.64 (H) 11/29/2021    HGB 10.5 (L) 11/29/2021    HCT 32.8 (L) 11/29/2021    MCV 96 11/29/2021     11/29/2021          Lab Results   Component Value Date    FERRITIN 43 11/29/2021           Meds:losartan            Health Maintenance Due   Topic Date Due    Shingles Vaccine (1 of  "2) Never done    COVID-19 Vaccine (3 - Booster for Pfizer series) 01/18/2022    DEXA Scan  02/04/2022       Health Maintenance Topics with due status: Not Due       Topic Last Completion Date    TETANUS VACCINE 05/10/2017    Lipid Panel 05/26/2021       Tobacco Use: Low Risk     Smoking Tobacco Use: Never Smoker    Smokeless Tobacco Use: Never Used           Review of Systems   Constitutional: Negative for activity change, appetite change, chills, diaphoresis, fatigue, fever and unexpected weight change.   HENT: Negative for congestion, postnasal drip, rhinorrhea and sore throat.    Eyes: Negative for pain, redness and visual disturbance.   Respiratory: Negative for cough, shortness of breath and wheezing.    Cardiovascular: Negative for chest pain and palpitations.   Gastrointestinal: Negative for abdominal distention, abdominal pain, blood in stool, constipation, diarrhea, nausea and vomiting.   Genitourinary: Negative for dysuria.   Neurological: Negative for weakness and numbness.   Psychiatric/Behavioral: Negative for sleep disturbance.       Objective:   /60 (BP Location: Right arm, Patient Position: Sitting, BP Method: Medium (Manual))   Pulse 64   Ht 5' 7" (1.702 m)   Wt 77.8 kg (171 lb 8.3 oz)   SpO2 98%   BMI 26.86 kg/m²            Physical Exam  Vitals and nursing note reviewed.   Constitutional:       General: She is not in acute distress.     Appearance: She is well-developed. She is not diaphoretic.   HENT:      Head: Normocephalic.      Nose: Nose normal.   Eyes:      General:         Right eye: No discharge.         Left eye: No discharge.      Conjunctiva/sclera: Conjunctivae normal.      Pupils: Pupils are equal, round, and reactive to light.   Cardiovascular:      Rate and Rhythm: Normal rate and regular rhythm.      Heart sounds: Normal heart sounds. No murmur heard.    No friction rub. No gallop.   Pulmonary:      Effort: Pulmonary effort is normal. No respiratory distress. "   Abdominal:      General: Bowel sounds are normal. There is no distension.      Palpations: Abdomen is soft.   Musculoskeletal:         General: No deformity. Normal range of motion.      Cervical back: Normal range of motion.   Skin:     General: Skin is warm.   Neurological:      Mental Status: She is alert and oriented to person, place, and time.      Cranial Nerves: No cranial nerve deficit.             ASCVD  The ASCVD Risk score ([place] TOY Jr., et al., 2013) failed to calculate for the following reasons:    The 2013 ASCVD risk score is only valid for ages 40 to 79    Basic Labs  BMP  Lab Results   Component Value Date     11/29/2021    K 4.6 11/29/2021     11/29/2021    CO2 20 (L) 11/29/2021    BUN 29 (H) 11/29/2021    CREATININE 1.2 11/29/2021    CALCIUM 9.6 11/29/2021    ANIONGAP 10 11/29/2021    ESTGFRAFRICA 48.6 (A) 11/29/2021    EGFRNONAA 42.2 (A) 11/29/2021     Lab Results   Component Value Date    ALT 11 11/29/2021    AST 23 11/29/2021    ALKPHOS 65 11/29/2021    BILITOT 0.5 11/29/2021       Lab Results   Component Value Date    TSH 3.264 11/29/2021     Lab Results   Component Value Date    WBC 15.64 (H) 11/29/2021    HGB 10.5 (L) 11/29/2021    HCT 32.8 (L) 11/29/2021    MCV 96 11/29/2021     11/29/2021           Lipids  Lab Results   Component Value Date    CHOL 136 05/26/2021     Lab Results   Component Value Date    HDL 46 05/26/2021     Lab Results   Component Value Date    LDLCALC 69.0 05/26/2021     Lab Results   Component Value Date    TRIG 105 05/26/2021     Lab Results   Component Value Date    CHOLHDL 33.8 05/26/2021       DM  Lab Results   Component Value Date    HGBA1C 5.6 02/04/2019           Assessment:       1. Disorder of arteries and arterioles, unspecified    2. Stage 3a chronic kidney disease    3. CLL (chronic lymphocytic leukemia)    4. Major depressive disorder, recurrent, mild    5. PSVT (paroxysmal supraventricular tachycardia)    6. Essential hypertension           Plan:               Za was seen today for hypertension.    Diagnoses and all orders for this visit:    Disorder of arteries and arterioles, unspecified    Stage 3a chronic kidney disease  Chronic  Controlled  Patient is at goal today   I have reviewed lifestyle modification to achieve/maintain goals  We will continue the current medication regimen as listed below  Patient will follow up in 6 months   -     Basic Metabolic Panel; Future  -     CBC Auto Differential; Future  -     Hepatic Function Panel; Future  -     Urinalysis; Future  -     Protein / creatinine ratio, urine; Future  -     Basic Metabolic Panel; Standing  -     CBC Without Differential; Standing  -     Hepatic Function Panel; Standing  -     Urinalysis; Standing    CLL (chronic lymphocytic leukemia)  ACCEPTS REFERRAL AT THIS TIME   -     Ambulatory referral/consult to Hematology / Oncology; Future    Major depressive disorder, recurrent, mild    PSVT (paroxysmal supraventricular tachycardia)    Essential hypertension  Chronic  Controlled  Patient is at goal today   I have reviewed lifestyle modification to achieve/maintain goals  We will continue the current medication regimen as listed below  Patient will follow up in 6 months     Other orders  The following orders have not been finalized:  -     Cancel: carvediloL (COREG) 3.125 MG tablet        Future Appointments   Date Time Provider Department Center   9/23/2022 11:00 AM ALISA BARNES [facility] [place]   9/23/2022 11:15 AM LAB, DENISE [facility] [place]                 Disclaimer:  This note has been generated using voice-recognition software. There may be grammatical or spelling errors that have been missed during proof-reading

## 2022-03-23 ENCOUNTER — OFFICE VISIT (OUTPATIENT)
Dept: INTERNAL MEDICINE | Facility: CLINIC | Age: 82
End: 2022-03-23
Payer: MEDICARE

## 2022-03-23 VITALS
WEIGHT: 171.5 LBS | HEIGHT: 67 IN | SYSTOLIC BLOOD PRESSURE: 122 MMHG | DIASTOLIC BLOOD PRESSURE: 60 MMHG | HEART RATE: 64 BPM | OXYGEN SATURATION: 98 % | BODY MASS INDEX: 26.92 KG/M2

## 2022-03-23 DIAGNOSIS — I47.10 PSVT (PAROXYSMAL SUPRAVENTRICULAR TACHYCARDIA): ICD-10-CM

## 2022-03-23 DIAGNOSIS — N18.31 STAGE 3A CHRONIC KIDNEY DISEASE: ICD-10-CM

## 2022-03-23 DIAGNOSIS — F33.0 MAJOR DEPRESSIVE DISORDER, RECURRENT, MILD: ICD-10-CM

## 2022-03-23 DIAGNOSIS — I10 ESSENTIAL HYPERTENSION: ICD-10-CM

## 2022-03-23 DIAGNOSIS — I77.9 DISORDER OF ARTERIES AND ARTERIOLES, UNSPECIFIED: Primary | ICD-10-CM

## 2022-03-23 DIAGNOSIS — C91.10 CLL (CHRONIC LYMPHOCYTIC LEUKEMIA): ICD-10-CM

## 2022-03-23 PROCEDURE — 1125F AMNT PAIN NOTED PAIN PRSNT: CPT | Mod: CPTII,S$GLB,, | Performed by: INTERNAL MEDICINE

## 2022-03-23 PROCEDURE — 99499 UNLISTED E&M SERVICE: CPT | Mod: S$GLB,,, | Performed by: INTERNAL MEDICINE

## 2022-03-23 PROCEDURE — 3288F FALL RISK ASSESSMENT DOCD: CPT | Mod: CPTII,S$GLB,, | Performed by: INTERNAL MEDICINE

## 2022-03-23 PROCEDURE — 3074F PR MOST RECENT SYSTOLIC BLOOD PRESSURE < 130 MM HG: ICD-10-PCS | Mod: CPTII,S$GLB,, | Performed by: INTERNAL MEDICINE

## 2022-03-23 PROCEDURE — 3078F PR MOST RECENT DIASTOLIC BLOOD PRESSURE < 80 MM HG: ICD-10-PCS | Mod: CPTII,S$GLB,, | Performed by: INTERNAL MEDICINE

## 2022-03-23 PROCEDURE — 3288F PR FALLS RISK ASSESSMENT DOCUMENTED: ICD-10-PCS | Mod: CPTII,S$GLB,, | Performed by: INTERNAL MEDICINE

## 2022-03-23 PROCEDURE — 99499 RISK ADDL DX/OHS AUDIT: ICD-10-PCS | Mod: S$GLB,,, | Performed by: INTERNAL MEDICINE

## 2022-03-23 PROCEDURE — 1125F PR PAIN SEVERITY QUANTIFIED, PAIN PRESENT: ICD-10-PCS | Mod: CPTII,S$GLB,, | Performed by: INTERNAL MEDICINE

## 2022-03-23 PROCEDURE — 99214 PR OFFICE/OUTPT VISIT, EST, LEVL IV, 30-39 MIN: ICD-10-PCS | Mod: S$GLB,,, | Performed by: INTERNAL MEDICINE

## 2022-03-23 PROCEDURE — 99999 PR PBB SHADOW E&M-EST. PATIENT-LVL IV: CPT | Mod: PBBFAC,,, | Performed by: INTERNAL MEDICINE

## 2022-03-23 PROCEDURE — 1159F MED LIST DOCD IN RCRD: CPT | Mod: CPTII,S$GLB,, | Performed by: INTERNAL MEDICINE

## 2022-03-23 PROCEDURE — 3074F SYST BP LT 130 MM HG: CPT | Mod: CPTII,S$GLB,, | Performed by: INTERNAL MEDICINE

## 2022-03-23 PROCEDURE — 1101F PR PT FALLS ASSESS DOC 0-1 FALLS W/OUT INJ PAST YR: ICD-10-PCS | Mod: CPTII,S$GLB,, | Performed by: INTERNAL MEDICINE

## 2022-03-23 PROCEDURE — 99999 PR PBB SHADOW E&M-EST. PATIENT-LVL IV: ICD-10-PCS | Mod: PBBFAC,,, | Performed by: INTERNAL MEDICINE

## 2022-03-23 PROCEDURE — 3078F DIAST BP <80 MM HG: CPT | Mod: CPTII,S$GLB,, | Performed by: INTERNAL MEDICINE

## 2022-03-23 PROCEDURE — 99214 OFFICE O/P EST MOD 30 MIN: CPT | Mod: S$GLB,,, | Performed by: INTERNAL MEDICINE

## 2022-03-23 PROCEDURE — 1159F PR MEDICATION LIST DOCUMENTED IN MEDICAL RECORD: ICD-10-PCS | Mod: CPTII,S$GLB,, | Performed by: INTERNAL MEDICINE

## 2022-03-23 PROCEDURE — 1101F PT FALLS ASSESS-DOCD LE1/YR: CPT | Mod: CPTII,S$GLB,, | Performed by: INTERNAL MEDICINE

## 2022-03-23 RX ORDER — CARVEDILOL 3.12 MG/1
3.12 TABLET ORAL 2 TIMES DAILY WITH MEALS
Qty: 60 TABLET | Refills: 1 | Status: CANCELLED | OUTPATIENT
Start: 2022-03-23 | End: 2023-03-23

## 2022-03-23 NOTE — PROGRESS NOTES
Advance Care Planning     I initiated the process of advance care planning today and explained the importance of this process to the patient.  Then the patient received detailed information about the importance of designating a Health Care Power of  (HCPOA). she was instructed to communicate with this person about their wishes for future healthcare, should she become sick and lose decision-making capacity. The patient has not previously appointed a HCPOA. After our discussion, the patient has not decided to complete a HCPOA and would like to discuss further with her children

## 2022-03-24 ENCOUNTER — TELEPHONE (OUTPATIENT)
Dept: ADMINISTRATIVE | Facility: OTHER | Age: 82
End: 2022-03-24
Payer: MEDICARE

## 2022-03-28 ENCOUNTER — TELEPHONE (OUTPATIENT)
Dept: ADMINISTRATIVE | Facility: OTHER | Age: 82
End: 2022-03-28
Payer: MEDICARE

## 2022-05-16 ENCOUNTER — TELEPHONE (OUTPATIENT)
Dept: ADMINISTRATIVE | Facility: OTHER | Age: 82
End: 2022-05-16
Payer: MEDICARE

## 2022-05-19 ENCOUNTER — PES CALL (OUTPATIENT)
Dept: ADMINISTRATIVE | Facility: CLINIC | Age: 82
End: 2022-05-19
Payer: MEDICARE

## 2022-06-06 ENCOUNTER — TELEPHONE (OUTPATIENT)
Dept: ADMINISTRATIVE | Facility: OTHER | Age: 82
End: 2022-06-06
Payer: MEDICARE

## 2022-06-17 ENCOUNTER — TELEPHONE (OUTPATIENT)
Dept: ADMINISTRATIVE | Facility: OTHER | Age: 82
End: 2022-06-17
Payer: MEDICARE

## 2022-08-15 ENCOUNTER — PATIENT OUTREACH (OUTPATIENT)
Dept: ADMINISTRATIVE | Facility: HOSPITAL | Age: 82
End: 2022-08-15
Payer: MEDICARE

## 2022-08-15 NOTE — PROGRESS NOTES
08/15/2022 Care Everywhere updates requested and reviewed.  Immunizations reconciled. Media reports reviewed.  Duplicate HM overrides and  orders removed.  Overdue HM topic chart audit and/or requested.  Overdue lab testing linked to upcoming lab appointments if applies.  Lab [facility], and [facility] reviewed      Health Maintenance Due   Topic Date Due    Shingles Vaccine (1 of 2) Never done    COVID-19 Vaccine (3 - Booster for Pfizer series) 2022    DEXA Scan  2022

## 2022-08-31 DIAGNOSIS — Z78.0 MENOPAUSE: ICD-10-CM

## 2022-09-21 ENCOUNTER — TELEPHONE (OUTPATIENT)
Dept: ADMINISTRATIVE | Facility: CLINIC | Age: 82
End: 2022-09-21
Payer: MEDICARE

## 2022-09-21 NOTE — TELEPHONE ENCOUNTER
Called pt, informed pt I was calling to remind pt of her in office EAWV on 9/23/22; clinic location provided to patient; pt confirmed appointment

## 2022-11-14 DIAGNOSIS — M81.0 OSTEOPOROSIS, UNSPECIFIED OSTEOPOROSIS TYPE, UNSPECIFIED PATHOLOGICAL FRACTURE PRESENCE: ICD-10-CM

## 2022-11-14 RX ORDER — ALENDRONATE SODIUM 70 MG/1
70 TABLET ORAL WEEKLY
Qty: 12 TABLET | Refills: 1 | Status: SHIPPED | OUTPATIENT
Start: 2022-11-14 | End: 2023-03-23 | Stop reason: SDUPTHER

## 2022-12-15 ENCOUNTER — PES CALL (OUTPATIENT)
Dept: ADMINISTRATIVE | Facility: OTHER | Age: 82
End: 2022-12-15
Payer: MEDICARE

## 2022-12-16 ENCOUNTER — OFFICE VISIT (OUTPATIENT)
Dept: URGENT CARE | Facility: CLINIC | Age: 82
End: 2022-12-16
Payer: MEDICARE

## 2022-12-16 VITALS
HEART RATE: 78 BPM | WEIGHT: 171 LBS | DIASTOLIC BLOOD PRESSURE: 86 MMHG | OXYGEN SATURATION: 95 % | RESPIRATION RATE: 16 BRPM | SYSTOLIC BLOOD PRESSURE: 163 MMHG | HEIGHT: 67 IN | TEMPERATURE: 98 F | BODY MASS INDEX: 26.84 KG/M2

## 2022-12-16 DIAGNOSIS — M79.10 MYALGIA: Primary | ICD-10-CM

## 2022-12-16 DIAGNOSIS — R52 PAIN: ICD-10-CM

## 2022-12-16 DIAGNOSIS — R05.9 COUGH, UNSPECIFIED TYPE: ICD-10-CM

## 2022-12-16 LAB
CTP QC/QA: YES
SARS-COV-2 AG RESP QL IA.RAPID: NEGATIVE

## 2022-12-16 PROCEDURE — 3079F DIAST BP 80-89 MM HG: CPT | Mod: CPTII,S$GLB,,

## 2022-12-16 PROCEDURE — 99214 OFFICE O/P EST MOD 30 MIN: CPT | Mod: S$GLB,,,

## 2022-12-16 PROCEDURE — 1159F PR MEDICATION LIST DOCUMENTED IN MEDICAL RECORD: ICD-10-PCS | Mod: CPTII,S$GLB,,

## 2022-12-16 PROCEDURE — 3077F SYST BP >= 140 MM HG: CPT | Mod: CPTII,S$GLB,,

## 2022-12-16 PROCEDURE — 3077F PR MOST RECENT SYSTOLIC BLOOD PRESSURE >= 140 MM HG: ICD-10-PCS | Mod: CPTII,S$GLB,,

## 2022-12-16 PROCEDURE — 1160F PR REVIEW ALL MEDS BY PRESCRIBER/CLIN PHARMACIST DOCUMENTED: ICD-10-PCS | Mod: CPTII,S$GLB,,

## 2022-12-16 PROCEDURE — 3079F PR MOST RECENT DIASTOLIC BLOOD PRESSURE 80-89 MM HG: ICD-10-PCS | Mod: CPTII,S$GLB,,

## 2022-12-16 PROCEDURE — 87811 SARS CORONAVIRUS 2 ANTIGEN POCT, MANUAL READ: ICD-10-PCS | Mod: QW,S$GLB,,

## 2022-12-16 PROCEDURE — 87811 SARS-COV-2 COVID19 W/OPTIC: CPT | Mod: QW,S$GLB,,

## 2022-12-16 PROCEDURE — 71046 X-RAY EXAM CHEST 2 VIEWS: CPT | Mod: FY,S$GLB,, | Performed by: RADIOLOGY

## 2022-12-16 PROCEDURE — 99214 PR OFFICE/OUTPT VISIT, EST, LEVL IV, 30-39 MIN: ICD-10-PCS | Mod: S$GLB,,,

## 2022-12-16 PROCEDURE — 71046 XR CHEST PA AND LATERAL: ICD-10-PCS | Mod: FY,S$GLB,, | Performed by: RADIOLOGY

## 2022-12-16 PROCEDURE — 1160F RVW MEDS BY RX/DR IN RCRD: CPT | Mod: CPTII,S$GLB,,

## 2022-12-16 PROCEDURE — 1159F MED LIST DOCD IN RCRD: CPT | Mod: CPTII,S$GLB,,

## 2022-12-16 NOTE — PROGRESS NOTES
"Subjective:       Patient ID: Za Limon is a 82 y.o. female.    Vitals:  height is 5' 7" (1.702 m) and weight is 77.6 kg (171 lb). Her temperature is 98 °F (36.7 °C). Her blood pressure is 163/86 (abnormal) and her pulse is 78. Her respiration is 16 and oxygen saturation is 95%.     Chief Complaint: Pain    Patient presents to the clinic with the entire upper body pain x yesterday and congestion. Patient reports that pain is in back, neck, and goes to underneath rib cage. Patient reports that pain started yesterday. Patient has been taking Mucinex for symptoms. Patient denies fever, body aches or chills, nausea/vomiting diarrhea, chest pain, shortness of breath or dizziness.    Pain  This is a new problem. The current episode started in the past 7 days. The problem occurs constantly. The problem has been gradually worsening. Associated symptoms include congestion and neck pain. Treatments tried: mucinex.     HENT:  Positive for congestion.    Neck: Positive for neck pain.     Objective:      Physical Exam   Constitutional: She is oriented to person, place, and time. She appears well-developed. She is cooperative.  Non-toxic appearance. She does not appear ill. No distress.      Comments:Patient sitting in chair with no signs of distress. Patient able to complete sentences without pausing.       HENT:   Head: Normocephalic and atraumatic.   Ears:   Right Ear: Hearing, tympanic membrane, external ear and ear canal normal.   Left Ear: Hearing, tympanic membrane, external ear and ear canal normal.   Nose: Congestion present. No mucosal edema, rhinorrhea or nasal deformity. No epistaxis. Right sinus exhibits no maxillary sinus tenderness and no frontal sinus tenderness. Left sinus exhibits no maxillary sinus tenderness and no frontal sinus tenderness.   Mouth/Throat: Uvula is midline, oropharynx is clear and moist and mucous membranes are normal. No trismus in the jaw. Normal dentition. No uvula swelling. No " posterior oropharyngeal erythema.   Eyes: Conjunctivae and lids are normal. Right eye exhibits no discharge. Left eye exhibits no discharge. No scleral icterus.   Neck: Trachea normal and phonation normal. Neck supple.   Cardiovascular: Normal rate, regular rhythm, normal heart sounds and normal pulses.   Pulmonary/Chest: Effort normal and breath sounds normal. No respiratory distress.   Abdominal: Normal appearance and bowel sounds are normal. She exhibits no distension and no mass. Soft. There is no abdominal tenderness.   Musculoskeletal: Normal range of motion.         General: No deformity. Normal range of motion.   Neurological: She is alert and oriented to person, place, and time. She exhibits normal muscle tone. Coordination normal.   Skin: Skin is warm, dry, intact, not diaphoretic and not pale.   Psychiatric: Her speech is normal and behavior is normal. Judgment and thought content normal.   Nursing note and vitals reviewed.      XR CHEST PA AND LATERAL    Result Date: 12/16/2022  EXAMINATION: XR CHEST PA AND LATERAL CLINICAL HISTORY: Cough, unspecified TECHNIQUE: PA and lateral views of the chest were performed. COMPARISON: 12/21/2020 FINDINGS: The heart size is normal.  Mediastinum shows aortic atherosclerosis.  Lungs are expanded and clear.  No pneumonia or pleural fluid is detected.  Skeletal structures are intact.  Clips are seen in the abdomen.     No acute cardiopulmonary disease Electronically signed by: Sedrick Glass MD Date:    12/16/2022 Time:    15:52       Patient in no acute distress.   Discussed results/diagnosis/plan in depth with patient in clinic. Strict precautions given to patient to monitor for worsening signs and symptoms. Advised to follow up with primary.All questions answered. Strict ER precautions given. If your symptoms worsens or fail to improve you should go to the Emergency Room. Discharge and follow-up instructions given verbally/printed. Discharge and follow-up  instructions discussed with the patient who expressed understanding and willingness to comply with my recommendations.Patient voiced understanding and in agreement with current treatment plan.     Assessment:       1. Myalgia    2. Pain    3. Cough, unspecified type        Plan:       Myalgia    Pain  -     SARS Coronavirus 2 Antigen, POCT Manual Read    Cough, unspecified type  -     XR CHEST PA AND LATERAL; Future; Expected date: 12/16/2022         Medical Decision Making:   Urgent Care Management:  Patient in no acute distress. Lungs CTA. Xray reviewed and no acute process. Discussed f/u with PCP if pain persists. Discussed over the counter treatment with patient. Discussed negative COVID-19 test results. Discussed plan with Ricki Delarosa NP- agreed with plan. Discussed the importance of further evaluation if symptoms worsen. Patient stated verbal understanding.       Patient Instructions   Take tylenol as needed for pain.    Discussed f/u with PCP.    Patient Instructions   PLEASE READ YOUR DISCHARGE INSTRUCTIONS ENTIRELY AS IT CONTAINS IMPORTANT INFORMATION.     Please drink plenty of fluids.     Please get plenty of rest.     Please return here or go to the Emergency Department for any concerns or worsening of condition.     Please take an over the counter antihistamine medication (allegra/Claritin/Zyrtec) of your choice as directed.     Try an over the counter decongestant like Mucinex D or Sudafed. You buy this behind the pharmacy counter     If you do have Hypertension or palpitations, it is safe to take Coricidin HBP for relief of sinus symptoms.     If not allergic, please take over the counter Tylenol (Acetaminophen) and/or Motrin (Ibuprofen) as directed for control of pain and/or fever.  Please follow up with your primary care doctor or specialist as needed.     Sore throat recommendations: Warm fluids, warm salt water gargles, throat lozenges, tea, honey, soup, rest, hydration.     Use over the  counter flonase: one spray each nostril twice daily OR two sprays each nostril once daily.      If you  smoke, please stop smoking.     Please return or see your primary care doctor if you develop new or worsening symptoms.      Please arrange follow up with your primary medical clinic as soon as possible. You must understand that you've received an Urgent Care treatment only and that you may be released before all of your medical problems are known or treated. You, the patient, will arrange for follow up as instructed. If your symptoms worsen or fail to improve you should go to the Emergency Room.

## 2022-12-16 NOTE — PATIENT INSTRUCTIONS
Take tylenol as needed for pain.    Discussed f/u with PCP.    Patient Instructions   PLEASE READ YOUR DISCHARGE INSTRUCTIONS ENTIRELY AS IT CONTAINS IMPORTANT INFORMATION.     Please drink plenty of fluids.     Please get plenty of rest.     Please return here or go to the Emergency Department for any concerns or worsening of condition.     Please take an over the counter antihistamine medication (allegra/Claritin/Zyrtec) of your choice as directed.     Try an over the counter decongestant like Mucinex D or Sudafed. You buy this behind the pharmacy counter     If you do have Hypertension or palpitations, it is safe to take Coricidin HBP for relief of sinus symptoms.     If not allergic, please take over the counter Tylenol (Acetaminophen) and/or Motrin (Ibuprofen) as directed for control of pain and/or fever.  Please follow up with your primary care doctor or specialist as needed.     Sore throat recommendations: Warm fluids, warm salt water gargles, throat lozenges, tea, honey, soup, rest, hydration.     Use over the counter flonase: one spray each nostril twice daily OR two sprays each nostril once daily.      If you  smoke, please stop smoking.     Please return or see your primary care doctor if you develop new or worsening symptoms.      Please arrange follow up with your primary medical clinic as soon as possible. You must understand that you've received an Urgent Care treatment only and that you may be released before all of your medical problems are known or treated. You, the patient, will arrange for follow up as instructed. If your symptoms worsen or fail to improve you should go to the Emergency Room.

## 2022-12-20 ENCOUNTER — HOSPITAL ENCOUNTER (EMERGENCY)
Facility: HOSPITAL | Age: 82
Discharge: HOME | End: 2022-12-20
Attending: EMERGENCY MEDICINE
Payer: MEDICARE

## 2022-12-20 VITALS
WEIGHT: 170 LBS | RESPIRATION RATE: 22 BRPM | HEIGHT: 66 IN | BODY MASS INDEX: 27.32 KG/M2 | DIASTOLIC BLOOD PRESSURE: 118 MMHG | TEMPERATURE: 98 F | SYSTOLIC BLOOD PRESSURE: 157 MMHG | HEART RATE: 74 BPM | OXYGEN SATURATION: 96 %

## 2022-12-20 DIAGNOSIS — R07.9 CHEST PAIN: ICD-10-CM

## 2022-12-20 DIAGNOSIS — R10.13 EPIGASTRIC PAIN: Primary | ICD-10-CM

## 2022-12-20 LAB
ALBUMIN SERPL BCP-MCNC: 4.5 G/DL (ref 3.5–5.2)
ALP SERPL-CCNC: 56 U/L (ref 55–135)
ALT SERPL W/O P-5'-P-CCNC: 12 U/L (ref 10–44)
ANION GAP SERPL CALC-SCNC: 12 MMOL/L (ref 8–16)
AST SERPL-CCNC: 19 U/L (ref 10–40)
BASOPHILS # BLD AUTO: 0.05 K/UL (ref 0–0.2)
BASOPHILS NFR BLD: 0.3 % (ref 0–1.9)
BILIRUB SERPL-MCNC: 0.6 MG/DL (ref 0.1–1)
BILIRUB UR QL STRIP: NEGATIVE
BUN SERPL-MCNC: 18 MG/DL (ref 8–23)
CALCIUM SERPL-MCNC: 9.5 MG/DL (ref 8.7–10.5)
CHLORIDE SERPL-SCNC: 106 MMOL/L (ref 95–110)
CK SERPL-CCNC: 80 U/L (ref 20–180)
CLARITY UR: CLEAR
CO2 SERPL-SCNC: 20 MMOL/L (ref 23–29)
COLOR UR: COLORLESS
CREAT SERPL-MCNC: 1.3 MG/DL (ref 0.5–1.4)
CTP QC/QA: YES
DIFFERENTIAL METHOD: ABNORMAL
EOSINOPHIL # BLD AUTO: 0.1 K/UL (ref 0–0.5)
EOSINOPHIL NFR BLD: 0.6 % (ref 0–8)
ERYTHROCYTE [DISTWIDTH] IN BLOOD BY AUTOMATED COUNT: 13.9 % (ref 11.5–14.5)
EST. GFR  (NO RACE VARIABLE): 41 ML/MIN/1.73 M^2
GLUCOSE SERPL-MCNC: 100 MG/DL (ref 70–110)
GLUCOSE UR QL STRIP: NEGATIVE
HCT VFR BLD AUTO: 35.2 % (ref 37–48.5)
HGB BLD-MCNC: 11.4 G/DL (ref 12–16)
HGB UR QL STRIP: NEGATIVE
IMM GRANULOCYTES # BLD AUTO: 0.02 K/UL (ref 0–0.04)
IMM GRANULOCYTES NFR BLD AUTO: 0.1 % (ref 0–0.5)
INFLUENZA A, MOLECULAR: NEGATIVE
INFLUENZA B, MOLECULAR: NEGATIVE
KETONES UR QL STRIP: NEGATIVE
LEUKOCYTE ESTERASE UR QL STRIP: NEGATIVE
LIPASE SERPL-CCNC: 45 U/L (ref 4–60)
LYMPHOCYTES # BLD AUTO: 12.2 K/UL (ref 1–4.8)
LYMPHOCYTES NFR BLD: 74.4 % (ref 18–48)
MAGNESIUM SERPL-MCNC: 2.5 MG/DL (ref 1.6–2.6)
MCH RBC QN AUTO: 32.4 PG (ref 27–31)
MCHC RBC AUTO-ENTMCNC: 32.4 G/DL (ref 32–36)
MCV RBC AUTO: 100 FL (ref 82–98)
MONOCYTES # BLD AUTO: 0.5 K/UL (ref 0.3–1)
MONOCYTES NFR BLD: 3.2 % (ref 4–15)
NEUTROPHILS # BLD AUTO: 3.5 K/UL (ref 1.8–7.7)
NEUTROPHILS NFR BLD: 21.4 % (ref 38–73)
NITRITE UR QL STRIP: NEGATIVE
NRBC BLD-RTO: 0 /100 WBC
PH UR STRIP: 7 [PH] (ref 5–8)
PLATELET # BLD AUTO: 174 K/UL (ref 150–450)
PMV BLD AUTO: 12.1 FL (ref 9.2–12.9)
POTASSIUM SERPL-SCNC: 5.1 MMOL/L (ref 3.5–5.1)
PROT SERPL-MCNC: 7.4 G/DL (ref 6–8.4)
PROT UR QL STRIP: NEGATIVE
RBC # BLD AUTO: 3.52 M/UL (ref 4–5.4)
SARS-COV-2 RDRP RESP QL NAA+PROBE: NEGATIVE
SODIUM SERPL-SCNC: 138 MMOL/L (ref 136–145)
SP GR UR STRIP: 1 (ref 1–1.03)
SPECIMEN SOURCE: NORMAL
TROPONIN I SERPL DL<=0.01 NG/ML-MCNC: <0.006 NG/ML (ref 0–0.03)
URN SPEC COLLECT METH UR: ABNORMAL
UROBILINOGEN UR STRIP-ACNC: NEGATIVE EU/DL
WBC # BLD AUTO: 16.35 K/UL (ref 3.9–12.7)

## 2022-12-20 PROCEDURE — 96374 THER/PROPH/DIAG INJ IV PUSH: CPT

## 2022-12-20 PROCEDURE — 83690 ASSAY OF LIPASE: CPT | Performed by: EMERGENCY MEDICINE

## 2022-12-20 PROCEDURE — 82550 ASSAY OF CK (CPK): CPT | Performed by: EMERGENCY MEDICINE

## 2022-12-20 PROCEDURE — 93005 ELECTROCARDIOGRAM TRACING: CPT

## 2022-12-20 PROCEDURE — 93010 ELECTROCARDIOGRAM REPORT: CPT | Mod: ,,, | Performed by: INTERNAL MEDICINE

## 2022-12-20 PROCEDURE — 85027 COMPLETE CBC AUTOMATED: CPT | Performed by: NURSE PRACTITIONER

## 2022-12-20 PROCEDURE — 85007 BL SMEAR W/DIFF WBC COUNT: CPT | Performed by: NURSE PRACTITIONER

## 2022-12-20 PROCEDURE — 96375 TX/PRO/DX INJ NEW DRUG ADDON: CPT

## 2022-12-20 PROCEDURE — 85060 BLOOD SMEAR INTERPRETATION: CPT | Mod: ,,, | Performed by: PATHOLOGY

## 2022-12-20 PROCEDURE — 99285 EMERGENCY DEPT VISIT HI MDM: CPT | Mod: 25

## 2022-12-20 PROCEDURE — 63600175 PHARM REV CODE 636 W HCPCS: Performed by: EMERGENCY MEDICINE

## 2022-12-20 PROCEDURE — 93010 EKG 12-LEAD: ICD-10-PCS | Mod: ,,, | Performed by: INTERNAL MEDICINE

## 2022-12-20 PROCEDURE — 83735 ASSAY OF MAGNESIUM: CPT | Performed by: EMERGENCY MEDICINE

## 2022-12-20 PROCEDURE — 80053 COMPREHEN METABOLIC PANEL: CPT | Performed by: NURSE PRACTITIONER

## 2022-12-20 PROCEDURE — 85060 PATHOLOGIST REVIEW: ICD-10-PCS | Mod: ,,, | Performed by: PATHOLOGY

## 2022-12-20 PROCEDURE — 87502 INFLUENZA DNA AMP PROBE: CPT | Performed by: EMERGENCY MEDICINE

## 2022-12-20 PROCEDURE — 25000003 PHARM REV CODE 250: Performed by: EMERGENCY MEDICINE

## 2022-12-20 PROCEDURE — 81003 URINALYSIS AUTO W/O SCOPE: CPT | Performed by: NURSE PRACTITIONER

## 2022-12-20 PROCEDURE — 87635 SARS-COV-2 COVID-19 AMP PRB: CPT | Performed by: EMERGENCY MEDICINE

## 2022-12-20 PROCEDURE — 84484 ASSAY OF TROPONIN QUANT: CPT | Performed by: NURSE PRACTITIONER

## 2022-12-20 RX ORDER — PROCHLORPERAZINE EDISYLATE 5 MG/ML
10 INJECTION INTRAMUSCULAR; INTRAVENOUS
Status: COMPLETED | OUTPATIENT
Start: 2022-12-20 | End: 2022-12-20

## 2022-12-20 RX ORDER — MAG HYDROX/ALUMINUM HYD/SIMETH 200-200-20
30 SUSPENSION, ORAL (FINAL DOSE FORM) ORAL ONCE
Status: COMPLETED | OUTPATIENT
Start: 2022-12-20 | End: 2022-12-20

## 2022-12-20 RX ORDER — KETOROLAC TROMETHAMINE 30 MG/ML
15 INJECTION, SOLUTION INTRAMUSCULAR; INTRAVENOUS
Status: COMPLETED | OUTPATIENT
Start: 2022-12-20 | End: 2022-12-20

## 2022-12-20 RX ORDER — SUCRALFATE 1 G/10ML
1 SUSPENSION ORAL 4 TIMES DAILY
Qty: 414 ML | Refills: 0 | Status: SHIPPED | OUTPATIENT
Start: 2022-12-20 | End: 2023-01-30

## 2022-12-20 RX ORDER — ONDANSETRON 4 MG/1
4 TABLET, ORALLY DISINTEGRATING ORAL EVERY 6 HOURS PRN
Qty: 10 TABLET | Refills: 0 | Status: SHIPPED | OUTPATIENT
Start: 2022-12-20 | End: 2023-01-30

## 2022-12-20 RX ORDER — LIDOCAINE HYDROCHLORIDE 20 MG/ML
15 SOLUTION OROPHARYNGEAL ONCE
Status: COMPLETED | OUTPATIENT
Start: 2022-12-20 | End: 2022-12-20

## 2022-12-20 RX ADMIN — SODIUM CHLORIDE 1000 ML: 0.9 INJECTION, SOLUTION INTRAVENOUS at 03:12

## 2022-12-20 RX ADMIN — KETOROLAC TROMETHAMINE 15 MG: 30 INJECTION, SOLUTION INTRAMUSCULAR; INTRAVENOUS at 03:12

## 2022-12-20 RX ADMIN — PROCHLORPERAZINE EDISYLATE 10 MG: 5 INJECTION INTRAMUSCULAR; INTRAVENOUS at 03:12

## 2022-12-20 RX ADMIN — LIDOCAINE HYDROCHLORIDE 15 ML: 20 SOLUTION ORAL at 03:12

## 2022-12-20 RX ADMIN — ALUMINUM HYDROXIDE, MAGNESIUM HYDROXIDE, AND SIMETHICONE 30 ML: 200; 200; 20 SUSPENSION ORAL at 03:12

## 2022-12-20 NOTE — DISCHARGE INSTRUCTIONS
Please call your primary care physician for a follow up appointment for further evaluation and management. Please return with any new or worsening symptoms.

## 2022-12-20 NOTE — ED PROVIDER NOTES
Encounter Date: 12/20/2022       History     Chief Complaint   Patient presents with    Generalized Body Aches    Chest Pain     Pt states body aches for about 3 weeks.    Abdominal Pain     Pt states body ache for 3 weeks, denies fever, N/V/D. Pt states pain is also in upper abd/chest area. Pt states went to urgent care Friday with no findings.      83 y/o F presents to the ER c/o lower chest pain, back pain, epigastric pain. Onset a few weeks ago. Symptoms have been coming and going but worsened over the past few days. Describes an aching and burning pain. No relief with OTC medications. Notes occasional cough, nicole any HA, lightheadedness/dizziness, N/V, SOB, fever, constipation/diarrhea, dysuria. No other symptoms reported at this time.     Review of patient's allergies indicates:   Allergen Reactions    Codeine      Past Medical History:   Diagnosis Date    2nd degree AV block 5/6/2016    CLL (chronic lymphocytic leukemia)     Hyperlipidemia     Hypertension      Past Surgical History:   Procedure Laterality Date    HYSTERECTOMY      left leg laser surgery       Family History   Problem Relation Age of Onset    Stroke Father     Heart disease Sister     Heart disease Brother      Social History     Tobacco Use    Smoking status: Never    Smokeless tobacco: Never   Substance Use Topics    Alcohol use: Not Currently     Alcohol/week: 0.0 standard drinks     Comment: wine     Drug use: Not Currently     Review of Systems   Constitutional:  Negative for chills, fatigue and fever.   HENT:  Negative for congestion and sore throat.    Eyes:  Negative for photophobia and visual disturbance.   Respiratory:  Positive for cough. Negative for shortness of breath.    Cardiovascular:  Positive for chest pain. Negative for palpitations.   Gastrointestinal:  Positive for abdominal pain. Negative for diarrhea and vomiting.   Musculoskeletal:  Positive for back pain. Negative for neck pain and neck stiffness.   Neurological:   Negative for light-headedness, numbness and headaches.     Physical Exam     Initial Vitals   BP Pulse Resp Temp SpO2   12/20/22 1436 12/20/22 1436 12/20/22 1436 12/20/22 1521 12/20/22 1436   135/83 87 18 98.7 °F (37.1 °C) 98 %      MAP       --                Physical Exam    Nursing note and vitals reviewed.  Constitutional: She appears well-developed and well-nourished. No distress.   HENT:   Head: Normocephalic and atraumatic.   Eyes: Conjunctivae and EOM are normal. Pupils are equal, round, and reactive to light.   Neck: Neck supple. No tracheal deviation present.   Normal range of motion.  Cardiovascular:  Normal rate and intact distal pulses.           Pulmonary/Chest: No respiratory distress.   Abdominal: Abdomen is soft. She exhibits no distension. There is abdominal tenderness (Mild, epigastric). There is no rebound and no guarding.   Musculoskeletal:         General: No tenderness or edema. Normal range of motion.      Cervical back: Normal range of motion and neck supple.     Neurological: She is alert and oriented to person, place, and time. She has normal strength. No cranial nerve deficit. GCS score is 15. GCS eye subscore is 4. GCS verbal subscore is 5. GCS motor subscore is 6.   Skin: Skin is warm and dry.       ED Course   Procedures  Labs Reviewed   CBC W/ AUTO DIFFERENTIAL - Abnormal; Notable for the following components:       Result Value    WBC 16.35 (*)     RBC 3.52 (*)     Hemoglobin 11.4 (*)     Hematocrit 35.2 (*)      (*)     MCH 32.4 (*)     Lymph # 12.2 (*)     Gran % 21.4 (*)     Lymph % 74.4 (*)     Mono % 3.2 (*)     All other components within normal limits   COMPREHENSIVE METABOLIC PANEL - Abnormal; Notable for the following components:    CO2 20 (*)     eGFR 41 (*)     All other components within normal limits   URINALYSIS, REFLEX TO URINE CULTURE - Abnormal; Notable for the following components:    Color, UA Colorless (*)     All other components within normal limits     Narrative:     Specimen Source->Urine   INFLUENZA A & B BY MOLECULAR   TROPONIN I   MAGNESIUM   LIPASE   CK   SARS-COV-2 RDRP GENE     EKG Readings: (Independently Interpreted)   Initial Reading: No STEMI. Previous EKG: Compared with most recent EKG Previous EKG Date: 12/21/2020 (Minimal change). Rhythm: Normal Sinus Rhythm. Heart Rate: 96. Ectopy: PVCs. Conduction: Normal. ST Segments: Normal ST Segments. Axis: Normal.         X-Rays:   Independently Interpreted Readings:   Other Readings:  Imaging interpreted by radiologist and visualized by me:   Imaging Results              X-Ray Chest PA And Lateral (Final result)  Result time 12/20/22 16:11:02      Final result by Evan Abernathy MD (12/20/22 16:11:02)                   Impression:      No acute process.      Electronically signed by: Evan Abernathy MD  Date:    12/20/2022  Time:    16:11               Narrative:    EXAMINATION:  XR CHEST PA AND LATERAL    CLINICAL HISTORY:  Chest Pain;    TECHNIQUE:  PA and lateral views of the chest were performed.    COMPARISON:  12/16/2022.    FINDINGS:  Monitoring EKG leads are present.  The trachea is unremarkable.  There are calcifications of the aortic knob.  The cardiomediastinal silhouette is within normal limits.  There is no evidence of free air beneath the hemidiaphragms.  There are no pleural effusions.  There is no evidence of a pneumothorax.  There is no evidence of pneumomediastinum.  No airspace opacity is present.    There are degenerative changes in the osseous structures.  There are postop changes in right upper abdominal quadrant.                                      Medications   sodium chloride 0.9% bolus 1,000 mL 1,000 mL (1,000 mLs Intravenous New Bag 12/20/22 1508)   prochlorperazine injection Soln 10 mg (10 mg Intravenous Given 12/20/22 1519)   ketorolac injection 15 mg (15 mg Intravenous Given 12/20/22 1558)   aluminum-magnesium hydroxide-simethicone 200-200-20 mg/5 mL suspension 30 mL (30 mLs Oral  Given 12/20/22 6667)     And   LIDOcaine HCl 2% oral solution 15 mL (15 mLs Oral Given 12/20/22 3067)     Medical Decision Making:   Initial Assessment:   83 y/o F presents to the ER c/o body aches, lower chest and epigastric pain  Differential Diagnosis:   URI, covid, influenza, viral syndrome, ACS, dissection, PNA, PTX, musculoskeletal, GERD   Independently Interpreted Test(s):   I have ordered and independently interpreted X-rays - see prior notes.  I have ordered and independently interpreted EKG Reading(s) - see prior notes  Clinical Tests:   Lab Tests: Reviewed       <> Summary of Lab: Benign  ED Management:  Patient given IVF, toradol, compazine, GI cocktail. On reevaluation, she reports resolution of symptoms. Informed patient of results as well as plan to discharge with rx for zofran and carafate, instructed on home mgmt, f/u with PCP, strict return precautions given. Patient expressed good understanding and is comfortable with discharge at this time.                         Clinical Impression:   Final diagnoses:  [R10.13] Epigastric pain (Primary)        ED Disposition Condition    Discharge Stable          ED Prescriptions       Medication Sig Dispense Start Date End Date Auth. Provider    ondansetron (ZOFRAN-ODT) 4 MG TbDL Take 1 tablet (4 mg total) by mouth every 6 (six) hours as needed (Nausea). 10 tablet 12/20/2022 -- Yariel Fenton MD    sucralfate (CARAFATE) 100 mg/mL suspension Take 10 mLs (1 g total) by mouth 4 (four) times daily. 414 mL 12/20/2022 -- Yariel Fenton MD          Follow-up Information       Follow up With Specialties Details Why Contact Info    Lamin Jiang III, MD Internal Medicine Schedule an appointment as soon as possible for a visit   [address] [place] [address] [phone]               Yariel Fenton MD  12/20/22 2227

## 2022-12-20 NOTE — FIRST PROVIDER EVALUATION
Emergency Department TeleTriage Encounter Note      CHIEF COMPLAINT    Chief Complaint   Patient presents with    Generalized Body Aches    Chest Pain     Pt states body aches for about 3 weeks.    Abdominal Pain     Pt states body ache for 3 weeks, denies fever, N/V/D. Pt states pain is also in upper abd/chest area. Pt states went to urgent care Friday with no findings.      VITAL SIGNS   Initial Vitals [12/20/22 1436]   BP Pulse Resp Temp SpO2   135/83 87 18 -- 98 %      MAP       --          ALLERGIES    Review of patient's allergies indicates:   Allergen Reactions    Codeine        PROVIDER TRIAGE NOTE  This is a teletriage evaluation of a 82 y.o. female presenting to the ED with c/o body aches, CP, and abdominal pain for 3 weeks.  Denies fever, N/V/D, SOB. Limited physical exam via telehealth: The patient is awake, alert, answering questions appropriately and is not in respiratory distress. Initial orders will be placed and care will be transferred to an alternate provider when patient is roomed for a full evaluation. Any additional orders and the final disposition will be determined by that provider.     ORDERS  Labs Reviewed   CBC W/ AUTO DIFFERENTIAL   COMPREHENSIVE METABOLIC PANEL   B-TYPE NATRIURETIC PEPTIDE   TROPONIN I   URINALYSIS, REFLEX TO URINE CULTURE       ED Orders (720h ago, onward)      Start Ordered     Status Ordering Provider    12/20/22 1444 12/20/22 1444  Vital signs  Every 15 min         Ordered RAMIRO HOBBS    12/20/22 1444 12/20/22 1444  Cardiac Monitoring - Adult  Continuous        Comments: Notify Physician If:    Ordered RAMIRO HOBBS    12/20/22 1444 12/20/22 1444  Pulse Oximetry Continuous  Continuous         Ordered RAMIRO HOBBS    12/20/22 1444 12/20/22 1444  Diet NPO  Diet effective now         Ordered RAMIRO HOBBS    12/20/22 1444 12/20/22 1444  Saline lock IV  Once         Ordered RAMIRO HOBBS    12/20/22 1444 12/20/22 1444  CBC auto differential  STAT         Ordered  RAMIRO HOBBS    12/20/22 1444 12/20/22 1444  Comprehensive metabolic panel  STAT         Ordered RAMIRO HOBBS    12/20/22 1444 12/20/22 1444  B-Type natriuretic peptide (BNP)  STAT         Ordered RAMIRO HOBBS    12/20/22 1444 12/20/22 1444  X-Ray Chest PA And Lateral  1 time imaging         Ordered RAMIRO HOBBS    12/20/22 1444 12/20/22 1444  Troponin I  STAT         Ordered RAMIRO HOBBS    12/20/22 1444 12/20/22 1444  Urinalysis, Reflex to Urine Culture Urine, Clean Catch  STAT         Ordered RAMIRO HOBBS    12/20/22 1439 12/20/22 1439  EKG 12-lead (Chest Pain) Age >30  Once        Comments: If patient > 30 yrs.    Completed by PANCHO LOPEZ on 12/20/2022 at  2:40 PM JASPAL CORTEZ              Virtual Visit Note: The provider triage portion of this emergency department evaluation and documentation was performed via [facility], a HIPAA-compliant telemedicine application, in concert with a tele-presenter in the room. A face to face patient evaluation with one of my colleagues will occur once the patient is placed in an emergency department room.      DISCLAIMER: This note was prepared with [employer] voice recognition transcription software. Garbled syntax, mangled pronouns, and other bizarre constructions may be attributed to that software system.

## 2022-12-22 LAB — PATH REV BLD -IMP: NORMAL

## 2023-01-03 NOTE — PROGRESS NOTES
Assessment:       1. Essential hypertension    2. Closed nondisplaced fracture of seventh cervical vertebra with routine healing, unspecified fracture morphology, subsequent encounter    3. Hyperlipidemia, unspecified hyperlipidemia type    4. CLL (chronic lymphocytic leukemia)    5. Second degree AV block    6. First degree AV block    7. Major depression, recurrent, chronic    8. Gastroesophageal reflux disease without esophagitis    9. 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    10. PSVT (paroxysmal supraventricular tachycardia)    11. Stage 3a chronic kidney disease    12. Localized osteoporosis ([place])    13. Muscle strain            Plan:         Za was seen today for hypertension and follow-up.    Diagnoses and all orders for this visit:    Essential hypertension  -     Basic Metabolic Panel; Standing  -     Hepatic Function Panel; Standing    Closed nondisplaced fracture of seventh cervical vertebra with routine healing, unspecified fracture morphology, subsequent encounter  -     DXA Bone Density Spine And Hip; Future    Hyperlipidemia, unspecified hyperlipidemia type  -     Lipid Panel; Standing    CLL (chronic lymphocytic leukemia)  -     CBC Auto Differential; Standing    Second degree AV block    First degree AV block    Major depression, recurrent, chronic    Gastroesophageal reflux disease without esophagitis    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    PSVT (paroxysmal supraventricular tachycardia)    Stage 3a chronic kidney disease    Localized osteoporosis ([place])  -     DXA Bone Density Spine And Hip; Future    Muscle strain  -     diclofenac sodium (VOLTAREN) 1 % Gel; Apply 2 g topically 4 (four) times daily.    Other orders  The following orders have not been finalized:  -     Cancel: losartan-hydrochlorothiazide 100-25 mg (HYZAAR) 100-25 mg per tablet      Wants to hold off on increase meds reports home bp are 120-130/70-80s , not eligible for dig med  Bp  "log and fu 2 weeks   If still elevated will go back to prior med - losartan-hctz 100-25      Subjective:       Patient ID: Za Limon is a 82 y.o. female.    Chief Complaint: No chief complaint on file.      Interim Hx    Seen by  for cold smyptoms , cold, body aches then progressed to ED, given fluids and instructed ot fu with PCP . Last visi twith me was 3/22/22      Concerns today    Patient reports she did a lot of cleaning during the holidays. She has soreness in her right back and shoulder      Chronic problems       Hypertension  This is a chronic problem. The current episode started more than 1 year ago. The problem has been waxing and waning since onset. The problem is controlled. Past treatments include angiotensin blockers. The current treatment provides significant improvement. There are no compliance problems.      Review of Systems          Health Maintenance Due   Topic Date Due    Shingles Vaccine (1 of 2) Never done    COVID-19 Vaccine (3 - Booster for Pfizer series) 10/13/2021    DEXA Scan  02/04/2022 12/16/2022.    FINDINGS:  Monitoring EKG leads are present. The trachea is unremarkable. There are calcifications of the aortic knob. The cardiomediastinal silhouette is within normal limits. There is no evidence of free air beneath the hemidiaphragms. There are no pleural effusions. There is no evidence of a pneumothorax. There is no evidence of pneumomediastinum. No airspace opacity is present.    There are degenerative changes in the osseous structures. There are postop changes in right upper abdominal quadrant.    Impression:    No acute process.    Objective:     BP (!) 158/80 (BP Location: Right arm, Patient Position: Sitting, BP Method: Medium (Manual))   Pulse 78   Ht 5' 3" (1.6 m)   Wt 75.4 kg (166 lb 3.6 oz)   SpO2 98%   BMI 29.45 kg/m²     Vitals 12/20/2022 12/16/2022 3/23/2022 12/14/2021 11/29/2021   Height 66 67 67 68 68   Weight (lbs) 170 171 171.52 175.05 180.12   BMI " (kg/m2) 27.5 26.8 26.9 26.6 27.4            Physical Exam            Future Appointments   Date Time Provider Department Center   1/13/2023  3:40 PM Lamin Jiang III, MD [facility]   1/30/2023  1:00 PM Cary Solo NP [facility]         Medication List with Changes/Refills   Current Medications    ACETAMINOPHEN (TYLENOL) 500 MG TABLET    Take 500 mg by mouth every 6 (six) hours as needed for Pain.    ALENDRONATE (FOSAMAX) 70 MG TABLET    Take 1 tablet (70 mg total) by mouth once a week.    ASPIRIN (ECOTRIN) 81 MG EC TABLET    Take 81 mg by mouth once daily.    DICLOFENAC SODIUM (VOLTAREN) 1 % GEL    Apply 2 g topically 2 (two) times daily.    DIPHENHYDRAMINE (BENADRYL) 25 MG CAPSULE    Take 25 mg by mouth nightly as needed for Itching.    ESCITALOPRAM OXALATE (LEXAPRO) 10 MG TABLET    Take 1 tablet (10 mg total) by mouth once daily.    GUAIFENESIN/DEXTROMETHORPHAN (ROBITUSSIN-DM ORAL)    Take by mouth every evening.    LOSARTAN (COZAAR) 100 MG TABLET    Take 1 tablet (100 mg total) by mouth once daily.    MECLIZINE (ANTIVERT) 25 MG TABLET    Take 1 tablet (25 mg total) by mouth 3 (three) times daily. Take 3 times daily for 3 days, then nightly for 3 days, then as needed for 3 days    OMEPRAZOLE (PRILOSEC) 20 MG CAPSULE    Take 1 capsule (20 mg total) by mouth once daily.    ONDANSETRON (ZOFRAN-ODT) 4 MG TBDL    Take 1 tablet (4 mg total) by mouth every 6 (six) hours as needed (Nausea).    POLYETHYLENE GLYCOL (GLYCOLAX) 17 GRAM PWPK    Take 17 g by mouth once daily.    ROSUVASTATIN (CRESTOR) 40 MG TAB    Take 1 tablet (40 mg total) by mouth every evening.    SUCRALFATE (CARAFATE) 100 MG/ML SUSPENSION    Take 10 mLs (1 g total) by mouth 4 (four) times daily.         Disclaimer:  This note has been generated using voice-recognition software. There may be grammatical or spelling errors that have been missed during proof-reading

## 2023-01-04 ENCOUNTER — OFFICE VISIT (OUTPATIENT)
Dept: INTERNAL MEDICINE | Facility: CLINIC | Age: 83
End: 2023-01-04
Payer: MEDICARE

## 2023-01-04 VITALS
SYSTOLIC BLOOD PRESSURE: 150 MMHG | HEART RATE: 78 BPM | WEIGHT: 166.25 LBS | DIASTOLIC BLOOD PRESSURE: 82 MMHG | OXYGEN SATURATION: 98 % | HEIGHT: 63 IN | BODY MASS INDEX: 29.46 KG/M2

## 2023-01-04 DIAGNOSIS — I44.0 FIRST DEGREE AV BLOCK: ICD-10-CM

## 2023-01-04 DIAGNOSIS — I44.1 SECOND DEGREE AV BLOCK: ICD-10-CM

## 2023-01-04 DIAGNOSIS — S12.601D CLOSED NONDISPLACED FRACTURE OF SEVENTH CERVICAL VERTEBRA WITH ROUTINE HEALING, UNSPECIFIED FRACTURE MORPHOLOGY, SUBSEQUENT ENCOUNTER: ICD-10-CM

## 2023-01-04 DIAGNOSIS — F33.9 MAJOR DEPRESSION, RECURRENT, CHRONIC: ICD-10-CM

## 2023-01-04 DIAGNOSIS — N18.31 STAGE 3A CHRONIC KIDNEY DISEASE: ICD-10-CM

## 2023-01-04 DIAGNOSIS — I47.10 PSVT (PAROXYSMAL SUPRAVENTRICULAR TACHYCARDIA): ICD-10-CM

## 2023-01-04 DIAGNOSIS — M81.6 LOCALIZED OSTEOPOROSIS (LEQUESNE): ICD-10-CM

## 2023-01-04 DIAGNOSIS — C91.10 CLL (CHRONIC LYMPHOCYTIC LEUKEMIA): ICD-10-CM

## 2023-01-04 DIAGNOSIS — E78.5 HYPERLIPIDEMIA, UNSPECIFIED HYPERLIPIDEMIA TYPE: ICD-10-CM

## 2023-01-04 DIAGNOSIS — K21.9 GASTROESOPHAGEAL REFLUX DISEASE WITHOUT ESOPHAGITIS: ICD-10-CM

## 2023-01-04 DIAGNOSIS — T14.8XXA MUSCLE STRAIN: ICD-10-CM

## 2023-01-04 DIAGNOSIS — M81.0 OSTEOPOROSIS OF FEMUR WITHOUT PATHOLOGICAL FRACTURE: ICD-10-CM

## 2023-01-04 DIAGNOSIS — I10 ESSENTIAL HYPERTENSION: Primary | ICD-10-CM

## 2023-01-04 PROCEDURE — 3079F DIAST BP 80-89 MM HG: CPT | Mod: CPTII,S$GLB,, | Performed by: INTERNAL MEDICINE

## 2023-01-04 PROCEDURE — 99999 PR PBB SHADOW E&M-EST. PATIENT-LVL IV: CPT | Mod: PBBFAC,,, | Performed by: INTERNAL MEDICINE

## 2023-01-04 PROCEDURE — 3288F PR FALLS RISK ASSESSMENT DOCUMENTED: ICD-10-PCS | Mod: CPTII,S$GLB,, | Performed by: INTERNAL MEDICINE

## 2023-01-04 PROCEDURE — 1160F RVW MEDS BY RX/DR IN RCRD: CPT | Mod: CPTII,S$GLB,, | Performed by: INTERNAL MEDICINE

## 2023-01-04 PROCEDURE — 3077F PR MOST RECENT SYSTOLIC BLOOD PRESSURE >= 140 MM HG: ICD-10-PCS | Mod: CPTII,S$GLB,, | Performed by: INTERNAL MEDICINE

## 2023-01-04 PROCEDURE — 99499 UNLISTED E&M SERVICE: CPT | Mod: S$GLB,,, | Performed by: INTERNAL MEDICINE

## 2023-01-04 PROCEDURE — 3077F SYST BP >= 140 MM HG: CPT | Mod: CPTII,S$GLB,, | Performed by: INTERNAL MEDICINE

## 2023-01-04 PROCEDURE — 1101F PR PT FALLS ASSESS DOC 0-1 FALLS W/OUT INJ PAST YR: ICD-10-PCS | Mod: CPTII,S$GLB,, | Performed by: INTERNAL MEDICINE

## 2023-01-04 PROCEDURE — 1160F PR REVIEW ALL MEDS BY PRESCRIBER/CLIN PHARMACIST DOCUMENTED: ICD-10-PCS | Mod: CPTII,S$GLB,, | Performed by: INTERNAL MEDICINE

## 2023-01-04 PROCEDURE — 99214 OFFICE O/P EST MOD 30 MIN: CPT | Mod: S$GLB,,, | Performed by: INTERNAL MEDICINE

## 2023-01-04 PROCEDURE — 99499 RISK ADDL DX/OHS AUDIT: ICD-10-PCS | Mod: S$GLB,,, | Performed by: INTERNAL MEDICINE

## 2023-01-04 PROCEDURE — 3079F PR MOST RECENT DIASTOLIC BLOOD PRESSURE 80-89 MM HG: ICD-10-PCS | Mod: CPTII,S$GLB,, | Performed by: INTERNAL MEDICINE

## 2023-01-04 PROCEDURE — 99999 PR PBB SHADOW E&M-EST. PATIENT-LVL IV: ICD-10-PCS | Mod: PBBFAC,,, | Performed by: INTERNAL MEDICINE

## 2023-01-04 PROCEDURE — 1125F AMNT PAIN NOTED PAIN PRSNT: CPT | Mod: CPTII,S$GLB,, | Performed by: INTERNAL MEDICINE

## 2023-01-04 PROCEDURE — 3288F FALL RISK ASSESSMENT DOCD: CPT | Mod: CPTII,S$GLB,, | Performed by: INTERNAL MEDICINE

## 2023-01-04 PROCEDURE — 1159F MED LIST DOCD IN RCRD: CPT | Mod: CPTII,S$GLB,, | Performed by: INTERNAL MEDICINE

## 2023-01-04 PROCEDURE — 1101F PT FALLS ASSESS-DOCD LE1/YR: CPT | Mod: CPTII,S$GLB,, | Performed by: INTERNAL MEDICINE

## 2023-01-04 PROCEDURE — 1159F PR MEDICATION LIST DOCUMENTED IN MEDICAL RECORD: ICD-10-PCS | Mod: CPTII,S$GLB,, | Performed by: INTERNAL MEDICINE

## 2023-01-04 PROCEDURE — 99214 PR OFFICE/OUTPT VISIT, EST, LEVL IV, 30-39 MIN: ICD-10-PCS | Mod: S$GLB,,, | Performed by: INTERNAL MEDICINE

## 2023-01-04 PROCEDURE — 1125F PR PAIN SEVERITY QUANTIFIED, PAIN PRESENT: ICD-10-PCS | Mod: CPTII,S$GLB,, | Performed by: INTERNAL MEDICINE

## 2023-01-04 RX ORDER — DICLOFENAC SODIUM 10 MG/G
2 GEL TOPICAL 4 TIMES DAILY
Qty: 100 G | Refills: 0 | Status: SHIPPED | OUTPATIENT
Start: 2023-01-04

## 2023-01-04 RX ORDER — LOSARTAN POTASSIUM AND HYDROCHLOROTHIAZIDE 25; 100 MG/1; MG/1
1 TABLET ORAL DAILY
Qty: 90 TABLET | Refills: 3 | Status: CANCELLED | OUTPATIENT
Start: 2023-01-04 | End: 2024-01-04

## 2023-01-04 NOTE — PATIENT INSTRUCTIONS
You can use the topical below    3. Aprecreme  4. Diclofenac  5. Salonpas    For more severe symptoms you can take the prescription medication

## 2023-01-06 ENCOUNTER — TELEPHONE (OUTPATIENT)
Dept: ADMINISTRATIVE | Facility: OTHER | Age: 83
End: 2023-01-06
Payer: MEDICARE

## 2023-01-11 ENCOUNTER — TELEPHONE (OUTPATIENT)
Dept: ADMINISTRATIVE | Facility: OTHER | Age: 83
End: 2023-01-11
Payer: MEDICARE

## 2023-01-30 ENCOUNTER — OFFICE VISIT (OUTPATIENT)
Dept: FAMILY MEDICINE | Facility: CLINIC | Age: 83
End: 2023-01-30
Payer: MEDICARE

## 2023-01-30 VITALS
HEART RATE: 92 BPM | OXYGEN SATURATION: 97 % | DIASTOLIC BLOOD PRESSURE: 66 MMHG | WEIGHT: 171.31 LBS | BODY MASS INDEX: 30.35 KG/M2 | HEIGHT: 63 IN | SYSTOLIC BLOOD PRESSURE: 150 MMHG

## 2023-01-30 DIAGNOSIS — I47.10 PSVT (PAROXYSMAL SUPRAVENTRICULAR TACHYCARDIA): ICD-10-CM

## 2023-01-30 DIAGNOSIS — I77.9 DISORDER OF ARTERIES AND ARTERIOLES, UNSPECIFIED: ICD-10-CM

## 2023-01-30 DIAGNOSIS — I70.0 AORTIC ATHEROSCLEROSIS: ICD-10-CM

## 2023-01-30 DIAGNOSIS — E46 PROTEIN-CALORIE MALNUTRITION, UNSPECIFIED SEVERITY: ICD-10-CM

## 2023-01-30 DIAGNOSIS — Z00.00 ENCOUNTER FOR PREVENTIVE HEALTH EXAMINATION: Primary | ICD-10-CM

## 2023-01-30 DIAGNOSIS — E21.3 HYPERPARATHYROIDISM: ICD-10-CM

## 2023-01-30 DIAGNOSIS — R26.9 ABNORMALITY OF GAIT AND MOBILITY: ICD-10-CM

## 2023-01-30 DIAGNOSIS — N18.31 STAGE 3A CHRONIC KIDNEY DISEASE: ICD-10-CM

## 2023-01-30 DIAGNOSIS — C91.10 CLL (CHRONIC LYMPHOCYTIC LEUKEMIA): ICD-10-CM

## 2023-01-30 DIAGNOSIS — E66.09 CLASS 1 OBESITY DUE TO EXCESS CALORIES WITH SERIOUS COMORBIDITY AND BODY MASS INDEX (BMI) OF 30.0 TO 30.9 IN ADULT: ICD-10-CM

## 2023-01-30 DIAGNOSIS — F33.9 MAJOR DEPRESSION, RECURRENT, CHRONIC: ICD-10-CM

## 2023-01-30 PROBLEM — R07.9 CHEST PAIN OF UNCERTAIN ETIOLOGY: Status: RESOLVED | Noted: 2020-12-24 | Resolved: 2023-01-30

## 2023-01-30 PROBLEM — E66.811 CLASS 1 OBESITY DUE TO EXCESS CALORIES WITH SERIOUS COMORBIDITY AND BODY MASS INDEX (BMI) OF 30.0 TO 30.9 IN ADULT: Status: ACTIVE | Noted: 2023-01-30

## 2023-01-30 PROCEDURE — G0439 PPPS, SUBSEQ VISIT: HCPCS | Mod: S$GLB,,, | Performed by: NURSE PRACTITIONER

## 2023-01-30 PROCEDURE — 99999 PR PBB SHADOW E&M-EST. PATIENT-LVL V: CPT | Mod: PBBFAC,,, | Performed by: NURSE PRACTITIONER

## 2023-01-30 PROCEDURE — 1159F MED LIST DOCD IN RCRD: CPT | Mod: CPTII,S$GLB,, | Performed by: NURSE PRACTITIONER

## 2023-01-30 PROCEDURE — G0439 PR MEDICARE ANNUAL WELLNESS SUBSEQUENT VISIT: ICD-10-PCS | Mod: S$GLB,,, | Performed by: NURSE PRACTITIONER

## 2023-01-30 PROCEDURE — 1125F PR PAIN SEVERITY QUANTIFIED, PAIN PRESENT: ICD-10-PCS | Mod: CPTII,S$GLB,, | Performed by: NURSE PRACTITIONER

## 2023-01-30 PROCEDURE — 1160F RVW MEDS BY RX/DR IN RCRD: CPT | Mod: CPTII,S$GLB,, | Performed by: NURSE PRACTITIONER

## 2023-01-30 PROCEDURE — 1159F PR MEDICATION LIST DOCUMENTED IN MEDICAL RECORD: ICD-10-PCS | Mod: CPTII,S$GLB,, | Performed by: NURSE PRACTITIONER

## 2023-01-30 PROCEDURE — 1101F PT FALLS ASSESS-DOCD LE1/YR: CPT | Mod: CPTII,S$GLB,, | Performed by: NURSE PRACTITIONER

## 2023-01-30 PROCEDURE — 1170F FXNL STATUS ASSESSED: CPT | Mod: CPTII,S$GLB,, | Performed by: NURSE PRACTITIONER

## 2023-01-30 PROCEDURE — 1101F PR PT FALLS ASSESS DOC 0-1 FALLS W/OUT INJ PAST YR: ICD-10-PCS | Mod: CPTII,S$GLB,, | Performed by: NURSE PRACTITIONER

## 2023-01-30 PROCEDURE — 1170F PR FUNCTIONAL STATUS ASSESSED: ICD-10-PCS | Mod: CPTII,S$GLB,, | Performed by: NURSE PRACTITIONER

## 2023-01-30 PROCEDURE — 3077F SYST BP >= 140 MM HG: CPT | Mod: CPTII,S$GLB,, | Performed by: NURSE PRACTITIONER

## 2023-01-30 PROCEDURE — 3288F PR FALLS RISK ASSESSMENT DOCUMENTED: ICD-10-PCS | Mod: CPTII,S$GLB,, | Performed by: NURSE PRACTITIONER

## 2023-01-30 PROCEDURE — 1160F PR REVIEW ALL MEDS BY PRESCRIBER/CLIN PHARMACIST DOCUMENTED: ICD-10-PCS | Mod: CPTII,S$GLB,, | Performed by: NURSE PRACTITIONER

## 2023-01-30 PROCEDURE — 99999 PR PBB SHADOW E&M-EST. PATIENT-LVL V: ICD-10-PCS | Mod: PBBFAC,,, | Performed by: NURSE PRACTITIONER

## 2023-01-30 PROCEDURE — 3077F PR MOST RECENT SYSTOLIC BLOOD PRESSURE >= 140 MM HG: ICD-10-PCS | Mod: CPTII,S$GLB,, | Performed by: NURSE PRACTITIONER

## 2023-01-30 PROCEDURE — 3078F PR MOST RECENT DIASTOLIC BLOOD PRESSURE < 80 MM HG: ICD-10-PCS | Mod: CPTII,S$GLB,, | Performed by: NURSE PRACTITIONER

## 2023-01-30 PROCEDURE — 3078F DIAST BP <80 MM HG: CPT | Mod: CPTII,S$GLB,, | Performed by: NURSE PRACTITIONER

## 2023-01-30 PROCEDURE — 1125F AMNT PAIN NOTED PAIN PRSNT: CPT | Mod: CPTII,S$GLB,, | Performed by: NURSE PRACTITIONER

## 2023-01-30 PROCEDURE — 3288F FALL RISK ASSESSMENT DOCD: CPT | Mod: CPTII,S$GLB,, | Performed by: NURSE PRACTITIONER

## 2023-01-30 NOTE — PATIENT INSTRUCTIONS
Counseling and Referral of Other Preventative  (Italic type indicates deductible and co-insurance are waived)    Patient Name: Za Limon  Today's Date: 1/30/2023    Health Maintenance       Date Due Completion Date    DEXA Scan 02/04/2022 2/4/2019    Shingles Vaccine (1 of 2) 01/30/2024 (Originally 3/28/1959) ---    COVID-19 Vaccine (3 - Booster for Pfizer series) 01/30/2024 (Originally 10/13/2021) 8/18/2021    Lipid Panel 05/26/2026 5/26/2021    TETANUS VACCINE 05/10/2027 5/10/2017    Override on 12/11/2013: Done        No orders of the defined types were placed in this encounter.    The following information is provided to all patients.  This information is to help you find resources for any of the problems found today that may be affecting your health:                Living healthy guide: www.[facility].[place].gov      Understanding Diabetes: www.diabetes.org      Eating healthy: www.cdc.gov/healthyweight      CDC home safety checklist: www.cdc.gov/steadi/patient.html      Agency on Aging: www.goea.[place].[facility]      Alcoholics anonymous (AA): www.aa.org      Physical Activity: www.sakshi.nih.gov/so4ztee      Tobacco use: www.quitwithusla.org

## 2023-01-30 NOTE — PROGRESS NOTES
"  Za Limon presented for a  Medicare AWV and comprehensive Health Risk Assessment today. The following components were reviewed and updated:    Medical history  Family History  Social history  Allergies and Current Medications  Health Risk Assessment  Health Maintenance  Care Team         ** See Completed Assessments for Annual Wellness Visit within the encounter summary.**         The following assessments were completed:  Living Situation  CAGE  Depression Screening  Timed Get Up and Go  Whisper Test  Cognitive Function Screening      Nutrition Screening  ADL Screening  PAQ Screening        Vitals:    01/30/23 1307 01/30/23 1320   BP: (!) 150/70 (!) 150/66   BP Location: Right arm Right arm   Patient Position: Sitting Sitting   BP Method: Large (Manual)    Pulse: 92    SpO2: 97%    Weight: 77.7 kg (171 lb 4.8 oz)    Height: 5' 3" (1.6 m)      Body mass index is 30.34 kg/m².    Physical Exam  Vitals reviewed.   Constitutional:       General: She is not in acute distress.     Appearance: Normal appearance. She is well-developed and well-groomed.   HENT:      Head: Normocephalic.   Eyes:      General:         Right eye: No discharge.         Left eye: No discharge.   Cardiovascular:      Rate and Rhythm: Normal rate.   Pulmonary:      Effort: Pulmonary effort is normal. No respiratory distress.   Skin:     General: Skin is warm and dry.      Coloration: Skin is not pale.   Neurological:      Mental Status: She is alert and oriented to person, place, and time.      Coordination: Coordination normal.   Psychiatric:         Attention and Perception: Attention normal.         Mood and Affect: Mood and affect normal.         Speech: Speech normal.         Behavior: Behavior normal. Behavior is cooperative.           Diagnoses and health risks identified today and associated recommendations/orders:    1. Encounter for preventive health examination    2. CLL (chronic lymphocytic leukemia)  Chronic; stable. Previous " followed by Hem/Onc, declines further follow-up/treatment at this time. Follow up with PCP.    3. Protein-calorie malnutrition, unspecified severity  Chronic; stable. Follow up with PCP.    4. Major depression, recurrent, chronic  Chronic; stable. No longer taking SSRI. Follow up with PCP.    5. Disorder of arteries and arterioles, unspecified  Chronic; stable on medication. Follow up with PCP.    6. Aortic atherosclerosis  Chronic; stable on medication. Follow up with PCP.    7. PSVT (paroxysmal supraventricular tachycardia)  Chronic; stable. Due to follow up with Cardiology.    8. Hyperparathyroidism  Chronic; stable. Follow up with PCP.    9. Stage 3a chronic kidney disease  Chronic; stable on medication. Follow up with PCP.    10. Abnormality of gait and mobility  Ambulates independently; slightly slowed but stable gait. Follow up with PCP.    11. Class 1 obesity due to excess calories with serious comorbidity and body mass index (BMI) of 30.0 to 30.9 in adult  Continue to eat a low salt/low fat diet and discussed importance of engaging in physical activity at least 5x/week for a minimum of 30 min/day.      Provided Za with a 5-10 year written screening schedule and personal prevention plan. Recommendations were developed using the USPSTF age appropriate recommendations. Education, counseling, and referrals were provided as needed. After Visit Summary printed and given to patient which includes a list of additional screenings/tests needed.    Follow up for your next annual wellness visit.    Cary Solo NP      Advance Care Planning     I offered to discuss advanced care planning, including how to pick a person who would make decisions for you if you were unable to make them for yourself, called a health care power of , and what kind of decisions you might make such as use of life sustaining treatments such as ventilators and tube feeding when faced with a life limiting illness recorded on a  living will that they will need to know. (How you want to be cared for as you near the end of your natural life)     X Patient is interested in learning more about how to make advanced directives. She has returned ACP documentation and will have scanned into her chart.

## 2023-01-31 ENCOUNTER — TELEPHONE (OUTPATIENT)
Dept: FAMILY MEDICINE | Facility: CLINIC | Age: 83
End: 2023-01-31
Payer: MEDICARE

## 2023-01-31 NOTE — TELEPHONE ENCOUNTER
----- Message from Cary Solo NP sent at 1/31/2023  8:31 AM CST -----  Regarding: FW: 2 week nurse bp  Can you please schedule this pt for 2 week bp check  ----- Message -----  From: Lamin Jiang III, MD  Sent: 1/31/2023   7:48 AM CST  To: Cary Solo NP  Subject: FW: 2 week nurse bp                              Hi  When is she coming back for the bp check?  ----- Message -----  From: Lamin Jiang III, MD  Sent: 1/18/2023   7:34 PM CST  To: Lamin Jiang III, MD  Subject: 2 week nurse bp                                  Seeing Dr JIANG for fu

## 2023-03-23 DIAGNOSIS — E78.2 MIXED HYPERLIPIDEMIA: ICD-10-CM

## 2023-03-23 DIAGNOSIS — M81.0 OSTEOPOROSIS, UNSPECIFIED OSTEOPOROSIS TYPE, UNSPECIFIED PATHOLOGICAL FRACTURE PRESENCE: ICD-10-CM

## 2023-03-23 RX ORDER — ALENDRONATE SODIUM 70 MG/1
70 TABLET ORAL WEEKLY
Qty: 12 TABLET | Refills: 0 | Status: SHIPPED | OUTPATIENT
Start: 2023-03-23 | End: 2023-09-29 | Stop reason: SDUPTHER

## 2023-03-23 RX ORDER — ROSUVASTATIN CALCIUM 40 MG/1
40 TABLET, COATED ORAL NIGHTLY
Qty: 90 TABLET | Refills: 0 | Status: SHIPPED | OUTPATIENT
Start: 2023-03-23 | End: 2023-08-11

## 2023-03-23 RX ORDER — LOSARTAN POTASSIUM 100 MG/1
100 TABLET ORAL DAILY
Qty: 90 TABLET | Refills: 0 | Status: SHIPPED | OUTPATIENT
Start: 2023-03-23 | End: 2023-11-01

## 2023-03-23 NOTE — TELEPHONE ENCOUNTER
Care Due:                  Date            Visit Type   Department     Provider  --------------------------------------------------------------------------------                                EP -                              PRIMARY      [facility] INTERNAL  Last Visit: 01-      CARE ([facility])   MEDICINE       Lamin Jiang                               -                              PRIMARY      [facility] INTERNAL  Next Visit: 04-      CARE ([facility])   MEDICINE       Lamin Jiang                                                            Last  Test          Frequency    Reason                     Performed    Due Date  --------------------------------------------------------------------------------    Lipid Panel.  12 months..  rosuvastatin.............  05- 05-    Health [facility] Embedded Care Gaps. Reference number: [id number]. 3/23/2023   3:07:52 PM CDT

## 2023-03-23 NOTE — TELEPHONE ENCOUNTER
Refill Routing Note   Medication(s) are not appropriate for processing by [facility] for the following reason(s):      Medication outside of protocol  Required labs outdated  Required vitals abnormal    [facility] action(s):  Defer  Route Labs due     Medication Therapy Plan: Labs(Lipid Panel);(alendronate-non delegated)      Appointments  past 12m or future 3m with PCP    Date Provider   Last Visit   1/4/2023 Lamin Jiang III, MD   Next Visit   4/28/2023 Lamin Jiang III, MD   ED visits in past 90 days: 0        Note composed:4:12 PM 03/23/2023

## 2023-05-29 PROBLEM — R06.09 DYSPNEA ON EXERTION: Status: RESOLVED | Noted: 2019-12-19 | Resolved: 2023-05-29

## 2023-05-29 PROBLEM — I77.9 DISORDER OF ARTERIES AND ARTERIOLES, UNSPECIFIED: Status: RESOLVED | Noted: 2022-03-22 | Resolved: 2023-05-29

## 2023-05-29 NOTE — PROGRESS NOTES
Assessment:       1. Essential hypertension    2. Stage 3a chronic kidney disease    3. Hypertensive kidney disease        Plan:         Za was seen today for hypertension.    Diagnoses and all orders for this visit:    Essential hypertension  Chronic  Controlled  Patient is at goal today   I have reviewed lifestyle modification to achieve/maintain goals  We will continue the current medication regimen as listed below  Patient will follow up in 6 months   -     Basic Metabolic Panel; Future    Stage 3a chronic kidney disease  -     Basic Metabolic Panel; Future  -     Basic Metabolic Panel; Standing  -     Lipid Panel; Future    Hypertensive kidney disease        Lets get bloodwork today  WE can do a follow in 6 month   The DAYANA ( diastolic ) is the bottom of the pressure it should be between 80-90s . The pulse should be less than 100  . If its levels are high on multiple check please contact the office  We can check up on the knee at the next follow up if your still having symptoms then maybe we can talk about doing an injection    Subjective:       Patient ID: Za Limon is a 83 y.o. female.    Chief Complaint: Hypertension      Interim Hx  Seen by me in [facility]  Did awv    Concerns today    Chronic problems     Hypertension    Hyperlipidemia      Patient Active Problem List   Diagnosis            CLL (chronic lymphocytic leukemia)  -declines futehr hunter     First degree AV block-  Second degree AV block   Previosulys seen by cardiology         Major depression, recurrent, chronic  - not on meds    Gastroesophageal reflux disease without esophagitis  - on prilosec    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  - on fosamax    PSVT (paroxysmal supraventricular tachycardia)  - previously followed by cards    Closed nondisplaced fracture of seventh cervical vertebra    Stage 3a chronic kidney disease  - stable not following with nephoro 0- declines further harrison                Review of Systems   All  "other systems reviewed and are negative.          Health Maintenance Due   Topic Date Due    DEXA Scan - declines for now 02/04/2022       Objective:     /83   Pulse 89   Ht 5' 3" (1.6 m)   Wt 72.9 kg (160 lb 11.5 oz)   SpO2 98%   BMI 28.47 kg/m²     Vitals 5/30/2023 1/30/2023 1/4/2023 12/20/2022 12/16/2022   Height 63 63 63 66 67   Weight (lbs) 160.72 171.3 166.23 170 171   BMI (kg/m2) 28.5 30.4 29.5 27.5 26.8          Physical Exam  Nursing note reviewed.   Constitutional:       General: She is not in acute distress.     Appearance: Normal appearance. She is not ill-appearing, toxic-appearing or diaphoretic.   HENT:      Head: Normocephalic.   Eyes:      Conjunctiva/sclera: Conjunctivae normal.   Cardiovascular:      Rate and Rhythm: Normal rate.   Pulmonary:      Effort: Pulmonary effort is normal. No respiratory distress.   Neurological:      General: No focal deficit present.      Mental Status: She is alert and oriented to person, place, and time.   Psychiatric:         Mood and Affect: Mood normal.         Behavior: Behavior normal.         Thought Content: Thought content normal.         Judgment: Judgment normal.         Recent Results (from the past 336 hour(s))   Basic Metabolic Panel    Collection Time: 05/30/23  2:35 PM   Result Value Ref Range    Sodium 139 136 - 145 mmol/L    Potassium 5.0 3.5 - 5.1 mmol/L    Chloride 105 95 - 110 mmol/L    CO2 24 23 - 29 mmol/L    Glucose 97 70 - 110 mg/dL    BUN 17 8 - 23 mg/dL    Creatinine 1.3 0.5 - 1.4 mg/dL    Calcium 9.9 8.7 - 10.5 mg/dL    Anion Gap 10 8 - 16 mmol/L    eGFR 40.5 (A) >60 mL/min/1.73 m^2   Lipid Panel    Collection Time: 05/30/23  2:35 PM   Result Value Ref Range    Cholesterol 147 120 - 199 mg/dL    Triglycerides 89 30 - 150 mg/dL    HDL 47 40 - 75 mg/dL    LDL Cholesterol 82.2 63.0 - 159.0 mg/dL    HDL/Cholesterol Ratio 32.0 20.0 - 50.0 %    Total Cholesterol/HDL Ratio 3.1 2.0 - 5.0    Non-HDL Cholesterol 100 mg/dL       ASCVD  The " ASCVD Risk score (Suzanne PRESSLEY, et al., 2019) failed to calculate for the following reasons:    The 2019 ASCVD risk score is only valid for ages 40 to 79    Basic Labs    BMP  Lab Results   Component Value Date     05/30/2023    K 5.0 05/30/2023     05/30/2023    CO2 24 05/30/2023    BUN 17 05/30/2023    CREATININE 1.3 05/30/2023    CALCIUM 9.9 05/30/2023    ANIONGAP 10 05/30/2023    ESTGFRAFRICA 48.6 (A) 11/29/2021    EGFRNONAA 42.2 (A) 11/29/2021     Lab Results   Component Value Date    EGFRNORACEVR 40.5 (A) 05/30/2023       Lab Results   Component Value Date    ALT 12 12/20/2022    AST 19 12/20/2022    ALKPHOS 56 12/20/2022    BILITOT 0.6 12/20/2022         Lab Results   Component Value Date    TSH 3.264 11/29/2021     Lab Results   Component Value Date    WBC 16.35 (H) 12/20/2022    HGB 11.4 (L) 12/20/2022    HCT 35.2 (L) 12/20/2022     (H) 12/20/2022     12/20/2022             Lipids  Lab Results   Component Value Date    CHOL 147 05/30/2023     Lab Results   Component Value Date    HDL 47 05/30/2023     Lab Results   Component Value Date    LDLCALC 82.2 05/30/2023     Lab Results   Component Value Date    TRIG 89 05/30/2023     Lab Results   Component Value Date    CHOLHDL 32.0 05/30/2023       DM  Lab Results   Component Value Date    HGBA1C 5.6 02/04/2019       Future Appointments   Date Time Provider Department Center   11/22/2023  8:00 AM LAB, [facility]   11/30/2023  2:00 PM Lamin Jiang III, MD [facility]           Medication List with Changes/Refills   Current Medications    ACETAMINOPHEN (TYLENOL) 500 MG TABLET    Take 500 mg by mouth every 6 (six) hours as needed for Pain.    ALENDRONATE (FOSAMAX) 70 MG TABLET    Take 1 tablet (70 mg total) by mouth once a week.    ASPIRIN (ECOTRIN) 81 MG EC TABLET    Take 81 mg by mouth once daily.    DICLOFENAC SODIUM (VOLTAREN) 1 % GEL    Apply 2 g topically 4 (four) times daily.    DIPHENHYDRAMINE (BENADRYL) 25 MG  CAPSULE    Take 25 mg by mouth nightly as needed for Itching.    GUAIFENESIN/DEXTROMETHORPHAN (ROBITUSSIN-DM ORAL)    Take by mouth every evening.    LOSARTAN (COZAAR) 100 MG TABLET    Take 1 tablet (100 mg total) by mouth once daily.    OMEPRAZOLE (PRILOSEC) 20 MG CAPSULE    Take 1 capsule (20 mg total) by mouth once daily.    POLYETHYLENE GLYCOL (GLYCOLAX) 17 GRAM PWPK    Take 17 g by mouth once daily.    ROSUVASTATIN (CRESTOR) 40 MG TAB    Take 1 tablet (40 mg total) by mouth every evening.         Disclaimer:  This note has been generated using voice-recognition software. There may be grammatical or spelling errors that have been missed during proof-reading

## 2023-05-30 ENCOUNTER — OFFICE VISIT (OUTPATIENT)
Dept: INTERNAL MEDICINE | Facility: CLINIC | Age: 83
End: 2023-05-30
Payer: MEDICARE

## 2023-05-30 ENCOUNTER — LAB VISIT (OUTPATIENT)
Dept: LAB | Facility: HOSPITAL | Age: 83
End: 2023-05-30
Attending: INTERNAL MEDICINE
Payer: MEDICARE

## 2023-05-30 VITALS
HEART RATE: 89 BPM | HEIGHT: 63 IN | OXYGEN SATURATION: 98 % | WEIGHT: 160.69 LBS | BODY MASS INDEX: 28.47 KG/M2 | DIASTOLIC BLOOD PRESSURE: 83 MMHG | SYSTOLIC BLOOD PRESSURE: 136 MMHG

## 2023-05-30 DIAGNOSIS — I12.9 HYPERTENSIVE KIDNEY DISEASE: ICD-10-CM

## 2023-05-30 DIAGNOSIS — N18.31 STAGE 3A CHRONIC KIDNEY DISEASE: ICD-10-CM

## 2023-05-30 DIAGNOSIS — I10 ESSENTIAL HYPERTENSION: ICD-10-CM

## 2023-05-30 DIAGNOSIS — I10 ESSENTIAL HYPERTENSION: Primary | ICD-10-CM

## 2023-05-30 PROBLEM — N25.81 HYPERPARATHYROIDISM, SECONDARY RENAL: Status: ACTIVE | Noted: 2023-01-30

## 2023-05-30 LAB
ANION GAP SERPL CALC-SCNC: 10 MMOL/L (ref 8–16)
BUN SERPL-MCNC: 17 MG/DL (ref 8–23)
CALCIUM SERPL-MCNC: 9.9 MG/DL (ref 8.7–10.5)
CHLORIDE SERPL-SCNC: 105 MMOL/L (ref 95–110)
CHOLEST SERPL-MCNC: 147 MG/DL (ref 120–199)
CHOLEST/HDLC SERPL: 3.1 {RATIO} (ref 2–5)
CO2 SERPL-SCNC: 24 MMOL/L (ref 23–29)
CREAT SERPL-MCNC: 1.3 MG/DL (ref 0.5–1.4)
EST. GFR  (NO RACE VARIABLE): 40.5 ML/MIN/1.73 M^2
GLUCOSE SERPL-MCNC: 97 MG/DL (ref 70–110)
HDLC SERPL-MCNC: 47 MG/DL (ref 40–75)
HDLC SERPL: 32 % (ref 20–50)
LDLC SERPL CALC-MCNC: 82.2 MG/DL (ref 63–159)
NONHDLC SERPL-MCNC: 100 MG/DL
POTASSIUM SERPL-SCNC: 5 MMOL/L (ref 3.5–5.1)
SODIUM SERPL-SCNC: 139 MMOL/L (ref 136–145)
TRIGL SERPL-MCNC: 89 MG/DL (ref 30–150)

## 2023-05-30 PROCEDURE — 1125F AMNT PAIN NOTED PAIN PRSNT: CPT | Mod: CPTII,S$GLB,, | Performed by: INTERNAL MEDICINE

## 2023-05-30 PROCEDURE — 1159F PR MEDICATION LIST DOCUMENTED IN MEDICAL RECORD: ICD-10-PCS | Mod: CPTII,S$GLB,, | Performed by: INTERNAL MEDICINE

## 2023-05-30 PROCEDURE — 1101F PT FALLS ASSESS-DOCD LE1/YR: CPT | Mod: CPTII,S$GLB,, | Performed by: INTERNAL MEDICINE

## 2023-05-30 PROCEDURE — 1125F PR PAIN SEVERITY QUANTIFIED, PAIN PRESENT: ICD-10-PCS | Mod: CPTII,S$GLB,, | Performed by: INTERNAL MEDICINE

## 2023-05-30 PROCEDURE — 3079F DIAST BP 80-89 MM HG: CPT | Mod: CPTII,S$GLB,, | Performed by: INTERNAL MEDICINE

## 2023-05-30 PROCEDURE — 3288F PR FALLS RISK ASSESSMENT DOCUMENTED: ICD-10-PCS | Mod: CPTII,S$GLB,, | Performed by: INTERNAL MEDICINE

## 2023-05-30 PROCEDURE — 3075F SYST BP GE 130 - 139MM HG: CPT | Mod: CPTII,S$GLB,, | Performed by: INTERNAL MEDICINE

## 2023-05-30 PROCEDURE — 1159F MED LIST DOCD IN RCRD: CPT | Mod: CPTII,S$GLB,, | Performed by: INTERNAL MEDICINE

## 2023-05-30 PROCEDURE — 1160F PR REVIEW ALL MEDS BY PRESCRIBER/CLIN PHARMACIST DOCUMENTED: ICD-10-PCS | Mod: CPTII,S$GLB,, | Performed by: INTERNAL MEDICINE

## 2023-05-30 PROCEDURE — 80061 LIPID PANEL: CPT | Performed by: INTERNAL MEDICINE

## 2023-05-30 PROCEDURE — 3288F FALL RISK ASSESSMENT DOCD: CPT | Mod: CPTII,S$GLB,, | Performed by: INTERNAL MEDICINE

## 2023-05-30 PROCEDURE — 3079F PR MOST RECENT DIASTOLIC BLOOD PRESSURE 80-89 MM HG: ICD-10-PCS | Mod: CPTII,S$GLB,, | Performed by: INTERNAL MEDICINE

## 2023-05-30 PROCEDURE — 99214 OFFICE O/P EST MOD 30 MIN: CPT | Mod: S$GLB,,, | Performed by: INTERNAL MEDICINE

## 2023-05-30 PROCEDURE — 1157F PR ADVANCE CARE PLAN OR EQUIV PRESENT IN MEDICAL RECORD: ICD-10-PCS | Mod: CPTII,S$GLB,, | Performed by: INTERNAL MEDICINE

## 2023-05-30 PROCEDURE — 36415 COLL VENOUS BLD VENIPUNCTURE: CPT | Mod: PO | Performed by: INTERNAL MEDICINE

## 2023-05-30 PROCEDURE — 1101F PR PT FALLS ASSESS DOC 0-1 FALLS W/OUT INJ PAST YR: ICD-10-PCS | Mod: CPTII,S$GLB,, | Performed by: INTERNAL MEDICINE

## 2023-05-30 PROCEDURE — 1160F RVW MEDS BY RX/DR IN RCRD: CPT | Mod: CPTII,S$GLB,, | Performed by: INTERNAL MEDICINE

## 2023-05-30 PROCEDURE — 99999 PR PBB SHADOW E&M-EST. PATIENT-LVL III: CPT | Mod: PBBFAC,,, | Performed by: INTERNAL MEDICINE

## 2023-05-30 PROCEDURE — 1157F ADVNC CARE PLAN IN RCRD: CPT | Mod: CPTII,S$GLB,, | Performed by: INTERNAL MEDICINE

## 2023-05-30 PROCEDURE — 80048 BASIC METABOLIC PNL TOTAL CA: CPT | Performed by: INTERNAL MEDICINE

## 2023-05-30 PROCEDURE — 3075F PR MOST RECENT SYSTOLIC BLOOD PRESS GE 130-139MM HG: ICD-10-PCS | Mod: CPTII,S$GLB,, | Performed by: INTERNAL MEDICINE

## 2023-05-30 PROCEDURE — 99999 PR PBB SHADOW E&M-EST. PATIENT-LVL III: ICD-10-PCS | Mod: PBBFAC,,, | Performed by: INTERNAL MEDICINE

## 2023-05-30 PROCEDURE — 99214 PR OFFICE/OUTPT VISIT, EST, LEVL IV, 30-39 MIN: ICD-10-PCS | Mod: S$GLB,,, | Performed by: INTERNAL MEDICINE

## 2023-05-30 NOTE — PATIENT INSTRUCTIONS
Lets get bloodwork today  WE can do a follow in 6 month   The DAYANA ( diastolic ) is the bottom of the pressure it should be between 80-90s . The pulse should be less than 100  . If its levels are high on multiple check please contact the office  We can check up on the knee at the next follow up if your still having symptoms then maybe we can talk about doing an injection

## 2023-05-31 ENCOUNTER — TELEPHONE (OUTPATIENT)
Dept: INTERNAL MEDICINE | Facility: CLINIC | Age: 83
End: 2023-05-31
Payer: MEDICARE

## 2023-05-31 NOTE — TELEPHONE ENCOUNTER
----- Message from Lamin Jiang III, MD sent at 5/31/2023  4:13 AM CDT -----  Can you please call the patient about the normal results.     The patient does not have the portal. Thanks!

## 2023-05-31 NOTE — PROGRESS NOTES
Can you please call the patient about the normal results.     The patient does not have the portal. Thanks!

## 2023-08-11 DIAGNOSIS — E78.2 MIXED HYPERLIPIDEMIA: ICD-10-CM

## 2023-08-11 RX ORDER — ROSUVASTATIN CALCIUM 40 MG/1
40 TABLET, COATED ORAL NIGHTLY
Qty: 90 TABLET | Refills: 1 | Status: SHIPPED | OUTPATIENT
Start: 2023-08-11 | End: 2024-01-17

## 2023-08-11 NOTE — TELEPHONE ENCOUNTER
No care due was identified.  [facility] Embedded Care Due Messages. Reference number: [id number].   8/11/2023 12:16:51 AM CDT

## 2023-08-11 NOTE — TELEPHONE ENCOUNTER
Refill Decision Note   Za Limon  is requesting a refill authorization.  Brief Assessment and Rationale for Refill:  Approve     Medication Therapy Plan:         Comments:     Note composed:8:08 AM 08/11/2023

## 2023-09-29 DIAGNOSIS — M81.0 OSTEOPOROSIS, UNSPECIFIED OSTEOPOROSIS TYPE, UNSPECIFIED PATHOLOGICAL FRACTURE PRESENCE: ICD-10-CM

## 2023-09-29 NOTE — TELEPHONE ENCOUNTER
Refill Routing Note   Medication(s) are not appropriate for processing by [facility] for the following reason(s):      Medication outside of protocol    [facility] action(s):  Route Care Due:  None identified            Appointments  past 12m or future 3m with PCP    Date Provider   Last Visit   5/30/2023 Lamin Jiang III, MD   Next Visit   11/30/2023 Lamin Jiang III, MD   ED visits in past 90 days: 0        Note composed:4:30 PM 09/29/2023

## 2023-10-01 RX ORDER — ALENDRONATE SODIUM 70 MG/1
70 TABLET ORAL WEEKLY
Qty: 12 TABLET | Refills: 0 | Status: SHIPPED | OUTPATIENT
Start: 2023-10-01 | End: 2024-01-02

## 2023-10-03 DIAGNOSIS — M81.0 OSTEOPOROSIS, UNSPECIFIED OSTEOPOROSIS TYPE, UNSPECIFIED PATHOLOGICAL FRACTURE PRESENCE: ICD-10-CM

## 2023-10-04 RX ORDER — ALENDRONATE SODIUM 70 MG/1
70 TABLET ORAL WEEKLY
Qty: 12 TABLET | Refills: 1 | OUTPATIENT
Start: 2023-10-04

## 2023-10-04 NOTE — TELEPHONE ENCOUNTER
Refill Decision Note   Za Limon  is requesting a refill authorization.  Brief Assessment and Rationale for Refill:  Quick Discontinue     Medication Therapy Plan:  Duplicate OOP; E-Prescribing Status: Receipt confirmed by pharmacy (10/1/2023 11:53 AM CDT)      Pharmacist review requested: Yes   Extended chart review required: Yes   Comments:     Note composed:9:54 AM 10/04/2023             Appointments     Last Visit   5/30/2023 Lamin Jiang III, MD   Next Visit   11/30/2023 Lamin Jiang III, MD

## 2023-10-04 NOTE — TELEPHONE ENCOUNTER
Refill Routing Note   Medication(s) are not appropriate for processing by [facility] for the following reason(s):      Medication outside of protocol    [facility] action(s):  Defer Care Due:  None identified     Medication Therapy Plan: Duplicate OOP; E-Prescribing Status: Receipt confirmed by pharmacy (10/1/2023 11:53 AM CDT)    Pharmacist review requested: Yes     Appointments  past 12m or future 3m with PCP    Date Provider   Last Visit   5/30/2023 Lamin Jiang III, MD   Next Visit   11/30/2023 Lamin Jiang III, MD   ED visits in past 90 days: 0        Note composed:8:16 AM 10/04/2023

## 2023-11-01 RX ORDER — LOSARTAN POTASSIUM 100 MG/1
100 TABLET ORAL
Qty: 90 TABLET | Refills: 1 | Status: SHIPPED | OUTPATIENT
Start: 2023-11-01 | End: 2023-11-20 | Stop reason: SDUPTHER

## 2023-11-01 NOTE — TELEPHONE ENCOUNTER
Care Due:                  Date            Visit Type   Department     Provider  --------------------------------------------------------------------------------                                EP -                              PRIMARY      [facility] INTERNAL  Last Visit: 05-      CARE ([facility])   PIPE Jiang                              [facility]                              PRIMARY      [facility] INTERNAL  Next Visit: 11-      CARE ([facility])   MEDICINE       Lamin Jiang                                                            Last  Test          Frequency    Reason                     Performed    Due Date  --------------------------------------------------------------------------------    CBC.........  12 months..  diclofenac...............  12-   12-    CMP.........  12 months..  alendronate, rosuvastatin  12-   12-    Mg Level....  12 months..  alendronate..............  12-   12-    Phosphate...  12 months..  alendronate..............  11- 11-    Vitamin D...  12 months..  alendronate..............  11- 11-    [facility] Embedded Care Due Messages. Reference number: [id number].   11/01/2023 12:09:58 AM CDT

## 2023-11-01 NOTE — TELEPHONE ENCOUNTER
Refill Routing Note   Medication(s) are not appropriate for processing by [facility] for the following reason(s):      No active prescription written by provider    [facility] action(s):  Defer Care Due:  Labs due            Appointments  past 12m or future 3m with PCP    Date Provider   Last Visit   5/30/2023 Lamin Jiang III, MD   Next Visit   11/30/2023 Lamin Jiang III, MD   ED visits in past 90 days: 0        Note composed:8:43 AM 11/01/2023

## 2023-11-20 DIAGNOSIS — K21.9 GASTROESOPHAGEAL REFLUX DISEASE WITHOUT ESOPHAGITIS: ICD-10-CM

## 2023-11-20 RX ORDER — LOSARTAN POTASSIUM 100 MG/1
100 TABLET ORAL DAILY
Qty: 90 TABLET | Refills: 1 | Status: SHIPPED | OUTPATIENT
Start: 2023-11-20

## 2023-11-20 RX ORDER — OMEPRAZOLE 20 MG/1
20 CAPSULE, DELAYED RELEASE ORAL DAILY
Qty: 90 CAPSULE | Refills: 1 | Status: SHIPPED | OUTPATIENT
Start: 2023-11-20

## 2023-11-20 NOTE — TELEPHONE ENCOUNTER
Refill Decision Note   Za Limon  is requesting a refill authorization.  Brief Assessment and Rationale for Refill:  Approve     Medication Therapy Plan:         Comments:     Note composed:4:46 PM 11/20/2023

## 2023-11-20 NOTE — TELEPHONE ENCOUNTER
No care due was identified.  [facility] Embedded Care Due Messages. Reference number: [id number].   11/20/2023 3:25:41 PM CST

## 2024-01-01 DIAGNOSIS — M81.0 OSTEOPOROSIS, UNSPECIFIED OSTEOPOROSIS TYPE, UNSPECIFIED PATHOLOGICAL FRACTURE PRESENCE: ICD-10-CM

## 2024-01-01 NOTE — TELEPHONE ENCOUNTER
Care Due:                  Date            Visit Type   Department     Provider  --------------------------------------------------------------------------------                                EP -                              PRIMARY      [facility] INTERNAL  Last Visit: 05-      CARE ([facility])   MEDICINE       Lamin Jiang  Next Visit: None Scheduled  None         None Found                                                            Last  Test          Frequency    Reason                     Performed    Due Date  --------------------------------------------------------------------------------    CBC.........  12 months..  diclofenac...............  12-   12-    CMP.........  12 months..  alendronate, rosuvastatin  12-   12-    Mg Level....  12 months..  alendronate..............  12-   12-    Phosphate...  12 months..  alendronate..............  Not Found    Overdue    Vitamin D...  12 months..  alendronate..............  Not Found    Overdue    [employer] Embedded Care Due Messages. Reference number: [id number].   1/01/2024 1:31:24 PM CST

## 2024-01-02 RX ORDER — ALENDRONATE SODIUM 70 MG/1
70 TABLET ORAL WEEKLY
Qty: 4 TABLET | Refills: 0 | Status: SHIPPED | OUTPATIENT
Start: 2024-01-02 | End: 2024-01-17

## 2024-01-02 NOTE — TELEPHONE ENCOUNTER
Refill Routing Note   Medication(s) are not appropriate for processing by [facility] for the following reason(s):        Outside of protocol    [facility] action(s):  Route     Requires labs : Yes             Appointments  past 12m or future 3m with PCP    Date Provider   Last Visit   5/30/2023 Lamin Jiang III, MD   Next Visit   Visit date not found Lamin Jiang III, MD   ED visits in past 90 days: 0        Note composed:7:58 AM 01/02/2024

## 2024-01-16 DIAGNOSIS — M81.0 OSTEOPOROSIS, UNSPECIFIED OSTEOPOROSIS TYPE, UNSPECIFIED PATHOLOGICAL FRACTURE PRESENCE: ICD-10-CM

## 2024-01-16 DIAGNOSIS — E78.2 MIXED HYPERLIPIDEMIA: ICD-10-CM

## 2024-01-16 NOTE — TELEPHONE ENCOUNTER
No care due was identified.  [facility] Embedded Care Due Messages. Reference number: [id number].   1/16/2024 12:51:46 PM CST

## 2024-01-17 RX ORDER — ALENDRONATE SODIUM 70 MG/1
70 TABLET ORAL WEEKLY
Qty: 4 TABLET | Refills: 0 | Status: SHIPPED | OUTPATIENT
Start: 2024-01-17 | End: 2024-02-26

## 2024-01-17 RX ORDER — ROSUVASTATIN CALCIUM 40 MG/1
40 TABLET, COATED ORAL NIGHTLY
Qty: 90 TABLET | Refills: 0 | Status: SHIPPED | OUTPATIENT
Start: 2024-01-17 | End: 2024-04-08

## 2024-01-17 NOTE — TELEPHONE ENCOUNTER
Refill Routing Note   Medication(s) are not appropriate for processing by [facility] for the following reason(s):        Outside of protocol  Required labs outdated    [facility] action(s):  Route  Defer        Medication Therapy Plan: FOSAMAX(OP)      Appointments  past 12m or future 3m with PCP    Date Provider   Last Visit   5/30/2023 Lamin Jiang III, MD   Next Visit   4/29/2024 Lamin Jiang III, MD   ED visits in past 90 days: 0        Note composed:8:04 AM 01/17/2024

## 2024-02-24 DIAGNOSIS — M81.0 OSTEOPOROSIS, UNSPECIFIED OSTEOPOROSIS TYPE, UNSPECIFIED PATHOLOGICAL FRACTURE PRESENCE: ICD-10-CM

## 2024-02-24 NOTE — TELEPHONE ENCOUNTER
Refill Routing Note   Medication(s) are not appropriate for processing by [facility] for the following reason(s):      Medication outside of protocol    [facility] action(s):  Route Care Due:  None identified            Appointments  past 12m or future 3m with PCP    Date Provider   Last Visit   5/30/2023 Lamin Jiang III, MD   Next Visit   4/29/2024 Lamin Jiang III, MD   ED visits in past 90 days: 0        Note composed:10:16 AM 02/24/2024

## 2024-02-24 NOTE — TELEPHONE ENCOUNTER
No care due was identified.  [facility] Embedded Care Due Messages. Reference number: [id number].   2/24/2024 7:00:07 AM CST

## 2024-02-26 RX ORDER — ALENDRONATE SODIUM 70 MG/1
70 TABLET ORAL WEEKLY
Qty: 4 TABLET | Refills: 0 | Status: SHIPPED | OUTPATIENT
Start: 2024-02-26 | End: 2024-03-26 | Stop reason: SDUPTHER

## 2024-03-19 DIAGNOSIS — E78.2 MIXED HYPERLIPIDEMIA: ICD-10-CM

## 2024-03-19 DIAGNOSIS — K21.9 GASTROESOPHAGEAL REFLUX DISEASE WITHOUT ESOPHAGITIS: ICD-10-CM

## 2024-03-19 DIAGNOSIS — M81.0 OSTEOPOROSIS, UNSPECIFIED OSTEOPOROSIS TYPE, UNSPECIFIED PATHOLOGICAL FRACTURE PRESENCE: ICD-10-CM

## 2024-03-19 RX ORDER — LOSARTAN POTASSIUM 100 MG/1
TABLET ORAL
Refills: 0 | OUTPATIENT
Start: 2024-03-19

## 2024-03-19 RX ORDER — OMEPRAZOLE 20 MG/1
CAPSULE, DELAYED RELEASE ORAL
Refills: 0 | OUTPATIENT
Start: 2024-03-19

## 2024-03-19 RX ORDER — ALENDRONATE SODIUM 70 MG/1
TABLET ORAL
Refills: 0 | OUTPATIENT
Start: 2024-03-19

## 2024-03-19 RX ORDER — ROSUVASTATIN CALCIUM 40 MG/1
TABLET, COATED ORAL
Refills: 0 | OUTPATIENT
Start: 2024-03-19

## 2024-03-19 NOTE — TELEPHONE ENCOUNTER
Refill Decision Note   aZ Limon  is requesting a refill authorization.  Brief Assessment and Rationale for Refill:  Quick Discontinue     Medication Therapy Plan: Pharmacy is requesting new scripts for the following medications without required information, (sig/ frequency/qty/etc)     Medication Reconciliation Completed: No   Comments:     No Care Gaps recommended.     Note composed:10:44 AM 03/19/2024

## 2024-03-19 NOTE — TELEPHONE ENCOUNTER
Care Due:                  Date            Visit Type   Department     Provider  --------------------------------------------------------------------------------                                EP -                              PRIMARY      [facility] INTERNAL  Last Visit: 05-      CARE ([facility])   MEDICINE       Lamin Jiang                              [facility] INTERNAL  Next Visit: 04-      CARE ([facility])   MEDICINE       Lamin Jiang                                                            Last  Test          Frequency    Reason                     Performed    Due Date  --------------------------------------------------------------------------------    CBC.........  12 months..  diclofenac...............  12-   12-    CMP.........  12 months..  alendronate, diclofenac,   12-   12-                             losartan, rosuvastatin...    Lipid Panel.  12 months..  rosuvastatin.............  05- 05-    Mg Level....  12 months..  alendronate..............  12-   12-    Phosphate...  12 months..  alendronate..............  Not Found    Overdue    Vitamin D...  12 months..  alendronate..............  Not Found    Overdue    [employer] Embedded Care Due Messages. Reference number: [id number].   3/19/2024 10:31:07 AM CDT

## 2024-03-26 DIAGNOSIS — M81.0 OSTEOPOROSIS, UNSPECIFIED OSTEOPOROSIS TYPE, UNSPECIFIED PATHOLOGICAL FRACTURE PRESENCE: ICD-10-CM

## 2024-03-26 RX ORDER — ALENDRONATE SODIUM 70 MG/1
70 TABLET ORAL WEEKLY
Qty: 12 TABLET | Refills: 0 | Status: SHIPPED | OUTPATIENT
Start: 2024-03-26 | End: 2024-06-16 | Stop reason: SDUPTHER

## 2024-03-26 NOTE — TELEPHONE ENCOUNTER
No care due was identified.  [facility] Embedded Care Due Messages. Reference number: [id number].   3/26/2024 10:20:43 AM CDT

## 2024-03-26 NOTE — TELEPHONE ENCOUNTER
Refill Routing Note   Medication(s) are not appropriate for processing by [facility] for the following reason(s):        Outside of protocol    [facility] action(s):  Route               Appointments  past 12m or future 3m with PCP    Date Provider   Last Visit   5/30/2023 Lamin Jiang III, MD   Next Visit   4/29/2024 Lamin Jiang III, MD   ED visits in past 90 days: 0        Note composed:10:22 AM 03/26/2024

## 2024-04-08 DIAGNOSIS — E78.2 MIXED HYPERLIPIDEMIA: ICD-10-CM

## 2024-04-08 RX ORDER — ROSUVASTATIN CALCIUM 40 MG/1
40 TABLET, COATED ORAL NIGHTLY
Qty: 90 TABLET | Refills: 0 | Status: SHIPPED | OUTPATIENT
Start: 2024-04-08

## 2024-04-08 NOTE — TELEPHONE ENCOUNTER
No care due was identified.  [facility] Embedded Care Due Messages. Reference number: [id number].   4/08/2024 12:33:27 AM CDT

## 2024-04-08 NOTE — TELEPHONE ENCOUNTER
Refill Routing Note   Medication(s) are not appropriate for processing by [facility] for the following reason(s):        Required labs outdated    [facility] action(s):  Defer               Appointments  past 12m or future 3m with PCP    Date Provider   Last Visit   5/30/2023 Lamin Jiang III, MD   Next Visit   5/14/2024 Lamin Jiang III, MD   ED visits in past 90 days: 0        Note composed:2:08 PM 04/08/2024

## 2024-06-15 DIAGNOSIS — M81.0 OSTEOPOROSIS, UNSPECIFIED OSTEOPOROSIS TYPE, UNSPECIFIED PATHOLOGICAL FRACTURE PRESENCE: ICD-10-CM

## 2024-06-15 NOTE — TELEPHONE ENCOUNTER
Care Due:                  Date            Visit Type   Department     Provider  --------------------------------------------------------------------------------                                EP -                              PRIMARY      [facility] INTERNAL  Last Visit: 05-      CARE ([facility])   MEDICINE       Lamin Jiang                              [facility] INTERNAL  Next Visit: 06-      CARE ([facility])   MEDICINE       Lamin Jiang                                                            Last  Test          Frequency    Reason                     Performed    Due Date  --------------------------------------------------------------------------------    CBC.........  12 months..  diclofenac...............  12-   12-    CMP.........  12 months..  alendronate, diclofenac,   12-   12-                             losartan, rosuvastatin...    Lipid Panel.  12 months..  rosuvastatin.............  05- 05-    Mg Level....  12 months..  alendronate..............  12-   12-    Phosphate...  12 months..  alendronate..............  Not Found    Overdue    Vitamin D...  12 months..  alendronate..............  Not Found    Overdue    [employer] Embedded Care Due Messages. Reference number: [id number].   6/15/2024 7:15:27 AM CDT

## 2024-06-16 RX ORDER — ALENDRONATE SODIUM 70 MG/1
70 TABLET ORAL WEEKLY
Qty: 12 TABLET | Refills: 0 | Status: SHIPPED | OUTPATIENT
Start: 2024-06-16

## 2024-06-16 NOTE — TELEPHONE ENCOUNTER
Refill Routing Note   Medication(s) are not appropriate for processing by [facility] for the following reason(s):        Patient not seen by provider within 15 months  Required labs outdated  Required vitals outdated    [facility] action(s):  Defer   Requires labs : Yes             Appointments  past 12m or future 3m with PCP    Date Provider   Last Visit   5/30/2023 Lamin Jiang III, MD   Next Visit   6/18/2024 Lamin Jiang III, MD   ED visits in past 90 days: 0        Note composed:9:23 PM 06/15/2024

## 2024-06-29 DIAGNOSIS — K21.9 GASTROESOPHAGEAL REFLUX DISEASE WITHOUT ESOPHAGITIS: ICD-10-CM

## 2024-06-29 RX ORDER — OMEPRAZOLE 20 MG/1
20 CAPSULE, DELAYED RELEASE ORAL
Qty: 90 CAPSULE | Refills: 0 | Status: SHIPPED | OUTPATIENT
Start: 2024-06-29

## 2024-06-29 NOTE — TELEPHONE ENCOUNTER
Provider Staff:  Action required for this patient    Requires appointment      Please see care gap opportunities below in Care Due Message.    Thanks!  [facility]     Appointments      Date Provider   Last Visit   5/30/2023 Lamin Jiang III, MD   Next Visit   9/26/2024 Lamin Jiang III, MD     Refill Decision Note   Za Limon  is requesting a refill authorization.  Brief Assessment and Rationale for Refill:  Approve     Medication Therapy Plan:         Comments:     Note composed:8:13 AM 06/29/2024             07-Apr-2023

## 2024-06-29 NOTE — TELEPHONE ENCOUNTER
Care Due:                  Date            Visit Type   Department     Provider  --------------------------------------------------------------------------------                                EP -                              PRIMARY      [facility] INTERNAL  Last Visit: 05-      CARE ([facility])   MEDICINE       Lamin Jiang  Next Visit: None Scheduled  None         None Found                                                            Last  Test          Frequency    Reason                     Performed    Due Date  --------------------------------------------------------------------------------    Office Visit  12 months..  alendronate, diclofenac,   05- 05-                             losartan, omeprazole,                             rosuvastatin.............    [employer] Embedded Care Due Messages. Reference number: [id number].   6/29/2024 7:03:43 AM CDT

## 2024-07-23 RX ORDER — LOSARTAN POTASSIUM 100 MG/1
100 TABLET ORAL DAILY
Qty: 30 TABLET | Refills: 0 | Status: SHIPPED | OUTPATIENT
Start: 2024-07-23

## 2024-07-23 NOTE — TELEPHONE ENCOUNTER
Refill Routing Note   Medication(s) are not appropriate for processing by [facility] for the following reason(s):        Required labs outdated: CMP    [facility] action(s):  Defer     Requires labs : Yes    Medication Therapy Plan:         Appointments  past 12m or future 3m with PCP    Date Provider   Last Visit   5/30/2023 Lamin Jiang III, MD   Next Visit   9/26/2024 Lamin Jiang III, MD   ED visits in past 90 days: 0        Note composed:9:12 AM 07/23/2024

## 2024-07-23 NOTE — TELEPHONE ENCOUNTER
No care due was identified.  [facility] Embedded Care Due Messages. Reference number: [id number].   7/23/2024 12:13:57 AM CDT

## 2024-08-15 DIAGNOSIS — K21.9 GASTROESOPHAGEAL REFLUX DISEASE WITHOUT ESOPHAGITIS: ICD-10-CM

## 2024-08-15 DIAGNOSIS — E78.2 MIXED HYPERLIPIDEMIA: ICD-10-CM

## 2024-08-15 RX ORDER — OMEPRAZOLE 20 MG/1
CAPSULE, DELAYED RELEASE ORAL
Refills: 0 | OUTPATIENT
Start: 2024-08-15

## 2024-08-15 RX ORDER — LOSARTAN POTASSIUM 100 MG/1
TABLET ORAL
Refills: 0 | OUTPATIENT
Start: 2024-08-15

## 2024-08-15 RX ORDER — ROSUVASTATIN CALCIUM 40 MG/1
TABLET, COATED ORAL
Refills: 0 | OUTPATIENT
Start: 2024-08-15

## 2024-08-15 NOTE — TELEPHONE ENCOUNTER
Care Due:                  Date            Visit Type   Department     Provider  --------------------------------------------------------------------------------                                EP -                              PRIMARY      [facility] INTERNAL  Last Visit: 05-      CARE ([facility])   MEDICINE       Lamin Jiang                              [facility]                              PRIMARY      [facility] INTERNAL  Next Visit: 09-      CARE ([facility])   MEDICINE       Lamin Jiang                                                            Last  Test          Frequency    Reason                     Performed    Due Date  --------------------------------------------------------------------------------    CBC.........  12 months..  diclofenac...............  12-   12-    CMP.........  12 months..  alendronate, diclofenac,   12-   12-                             losartan, rosuvastatin...    Lipid Panel.  12 months..  rosuvastatin.............  05- 05-    Mg Level....  12 months..  alendronate..............  12-   12-    Phosphate...  12 months..  alendronate..............  Not Found    Overdue    [employer] Embedded Care Due Messages. Reference number: [id number].   8/15/2024 2:54:51 PM CDT

## 2024-08-16 NOTE — TELEPHONE ENCOUNTER
[facility] Note  Quick DC. Inappropriate Request   Refill request requires further review by MD: NO   Medication Therapy Plan: Pharmacy is requesting new script(s) for the following medications without required information, (sig/ frequency/qty/etc)     [facility] action(s):  Quick Discontinue      Duplicate Pended Encounter(s)/ Last Prescribed Details:    Pharmacies have been requesting medications for patients without required information, (sig, frequency, qty, etc.). In addition, requests are sent for medication(s) pt. are currently not taking, and medications patients have never taken.    We have spoken to the pharmacies about these request types and advised their teams previously that we are unable to assess these New Script requests and require all details for these requests. This is a known issue and has been reported.        Medication related problems are not assessed for [facility].   Medication Reconciliation Completed? NO Were there pending details that required adjustment? NO     Automatic Epic Generated Protocol Data Below:   Requested Prescriptions   Pending Prescriptions Disp Refills    omeprazole (PRILOSEC) 20 MG capsule [Pharmacy Med Name: OMEPRAZOLE DR CAP]  0    losartan (COZAAR) 100 MG tablet [Pharmacy Med Name: LOSARTAN TAB]  0    rosuvastatin (CRESTOR) 40 MG Tab [Pharmacy Med Name: ROSUVASTATIN TAB]  0              Appointments      Date Provider   Last Visit   5/30/2023 Lamin Jiang III, MD   Next Visit   9/26/2024 Lamin Jiang III, MD        Note composed:9:55 PM 08/15/2024

## 2024-08-17 NOTE — TELEPHONE ENCOUNTER
No care due was identified.  [facility] Embedded Care Due Messages. Reference number: [id number].   8/17/2024 8:31:11 AM CDT

## 2024-08-18 RX ORDER — LOSARTAN POTASSIUM 100 MG/1
100 TABLET ORAL
Qty: 30 TABLET | Refills: 0 | Status: SHIPPED | OUTPATIENT
Start: 2024-08-18

## 2024-08-18 NOTE — TELEPHONE ENCOUNTER
Refill Routing Note   Medication(s) are not appropriate for processing by [facility] for the following reason(s):        Required labs outdated  Required vitals outdated    [facility] action(s):  Defer             Appointments  past 12m or future 3m with PCP    Date Provider   Last Visit   5/30/2023 Lamin Jiang III, MD   Next Visit   9/26/2024 Lamin Jiang III, MD   ED visits in past 90 days: 0        Note composed:9:17 PM 08/17/2024

## 2024-09-07 DIAGNOSIS — M81.0 OSTEOPOROSIS, UNSPECIFIED OSTEOPOROSIS TYPE, UNSPECIFIED PATHOLOGICAL FRACTURE PRESENCE: ICD-10-CM

## 2024-09-07 NOTE — TELEPHONE ENCOUNTER
No care due was identified.  [facility] Embedded Care Due Messages. Reference number: [id number].   9/07/2024 9:48:16 AM CDT

## 2024-09-09 DIAGNOSIS — M81.0 OSTEOPOROSIS, UNSPECIFIED OSTEOPOROSIS TYPE, UNSPECIFIED PATHOLOGICAL FRACTURE PRESENCE: ICD-10-CM

## 2024-09-09 RX ORDER — ALENDRONATE SODIUM 70 MG/1
70 TABLET ORAL WEEKLY
Qty: 12 TABLET | Refills: 0 | OUTPATIENT
Start: 2024-09-09

## 2024-09-09 RX ORDER — ALENDRONATE SODIUM 70 MG/1
70 TABLET ORAL WEEKLY
Qty: 12 TABLET | Refills: 0 | Status: SHIPPED | OUTPATIENT
Start: 2024-09-09

## 2024-09-09 NOTE — TELEPHONE ENCOUNTER
Refill Routing Note   Medication(s) are not appropriate for processing by [facility] for the following reason(s):        Outside of protocol    [facility] action(s):  Route               Appointments  past 12m or future 3m with PCP    Date Provider   Last Visit   5/30/2023 Lamin Jiang III, MD   Next Visit   9/9/2024 Lamin Jiang III, MD   ED visits in past 90 days: 0        Note composed:10:21 AM 09/09/2024

## 2024-09-09 NOTE — TELEPHONE ENCOUNTER
Refill Routing Note   Medication(s) are not appropriate for processing by [facility] for the following reason(s):        Outside of protocol    [facility] action(s):  Route             Appointments  past 12m or future 3m with PCP    Date Provider   Last Visit   5/30/2023 Lamin Jiang III, MD   Next Visit   9/26/2024 Lamin Jiang III, MD   ED visits in past 90 days: 0        Note composed:7:21 AM 09/09/2024

## 2024-09-09 NOTE — TELEPHONE ENCOUNTER
No care due was identified.  [facility] Embedded Care Due Messages. Reference number: [id number].   9/09/2024 6:53:17 AM CDT

## 2024-09-15 NOTE — TELEPHONE ENCOUNTER
No care due was identified.  [facility] Embedded Care Due Messages. Reference number: [id number].   9/15/2024 10:31:29 AM CDT

## 2024-09-16 RX ORDER — LOSARTAN POTASSIUM 100 MG/1
100 TABLET ORAL
Qty: 30 TABLET | Refills: 0 | Status: SHIPPED | OUTPATIENT
Start: 2024-09-16

## 2024-09-17 DIAGNOSIS — K21.9 GASTROESOPHAGEAL REFLUX DISEASE WITHOUT ESOPHAGITIS: ICD-10-CM

## 2024-09-17 RX ORDER — OMEPRAZOLE 20 MG/1
20 CAPSULE, DELAYED RELEASE ORAL
Qty: 30 CAPSULE | Refills: 0 | Status: SHIPPED | OUTPATIENT
Start: 2024-09-17

## 2024-09-17 NOTE — TELEPHONE ENCOUNTER
Refill Routing Note   Medication(s) are not appropriate for processing by [facility] for the following reason(s):        Patient not seen by provider within 15 months    [facility] action(s):  Defer               Appointments  past 12m or future 3m with PCP    Date Provider   Last Visit   Visit date not found Lamin Jiang III, MD   Next Visit   9/26/2024 Lamin Jiang III, MD   ED visits in past 90 days: 0        Note composed:5:07 PM 09/17/2024

## 2024-09-17 NOTE — TELEPHONE ENCOUNTER
No care due was identified.  [facility] Embedded Care Due Messages. Reference number: [id number].   9/17/2024 12:27:34 AM CDT

## 2024-09-20 DIAGNOSIS — E78.2 MIXED HYPERLIPIDEMIA: ICD-10-CM

## 2024-09-20 RX ORDER — ROSUVASTATIN CALCIUM 40 MG/1
40 TABLET, COATED ORAL NIGHTLY
Qty: 90 TABLET | Refills: 0 | Status: SHIPPED | OUTPATIENT
Start: 2024-09-20

## 2024-09-20 NOTE — TELEPHONE ENCOUNTER
Refill Routing Note   Medication(s) are not appropriate for processing by [facility] for the following reason(s):        Patient not seen by provider within 15 months  Required labs outdated    [facility] action(s):  Defer             Appointments  past 12m or future 3m with PCP    Date Provider   Last Visit   Visit date not found Lamin Jiang III, MD   Next Visit   9/26/2024 Lamin Jiang III, MD   ED visits in past 90 days: 0        Note composed:1:48 PM 09/20/2024

## 2024-09-20 NOTE — TELEPHONE ENCOUNTER
No care due was identified.  [facility] Embedded Care Due Messages. Reference number: [id number].   9/20/2024 12:31:07 AM CDT

## 2024-09-23 ENCOUNTER — TELEPHONE (OUTPATIENT)
Dept: FAMILY MEDICINE | Facility: CLINIC | Age: 84
End: 2024-09-23
Payer: MEDICARE

## 2024-09-23 ENCOUNTER — TELEPHONE (OUTPATIENT)
Dept: INTERNAL MEDICINE | Facility: CLINIC | Age: 84
End: 2024-09-23
Payer: MEDICARE

## 2024-09-23 NOTE — TELEPHONE ENCOUNTER
Attempted to contact pt in regards to appt schedule on 09/26/24 at 1040am left pt message stating that I will be canceling appt due to dr wright being out of the office and advised to call the office back.    Occupational Therapy    Visit Type: treatment  Precautions:  Medical precautions:  fall risk; standard precautions.        Lines:     Basic: O2 (2L O2 via NC)  Safety Measures: bed alarm (call light)  SUBJECTIVE  Patient agreed to participate in therapy this date.      Patient participated in all scheduled therapy time this session.    \"I just want to sleep.\"      OBJECTIVE                Use of 2 pound PVC pipe -    Bicep curls 1 x30  Chest press 1 x20    Education in prep for DC including recommendations for son to assist with laundry and cooking. Patient reports he will assist.   Skilled input: verbal instruction/cues         ASSESSMENT  Impairments: activity tolerance, balance, cognitive, coordination/proprioception, strength, safety awareness and decreased insight into deficits  Functional Limitations: functional mobility, toileting, IADLs, dressing, showering, participating in meaningful/purposeful activities, functional transfers and bathing  See first daily note for detailed assessment.     Discharge Recommendations:  Recommendation for Discharge Location: OT WI: Home with Home therapy  Recommendation for Discharge Support: OT WI: Intermittent assist daily, Assistance with medication, Assistance with IADLs  PT/OT Mobility Equipment for Discharge: none anticipated, pt has 2 WW - strongly encouarged to use at home  OT Identified Barriers to Discharge: none      Progress: progressing toward goals    • Skilled therapy is required to address these limitations in attempt to maximize the patient's independence.    Education:   - Present and ready to learn: patient  Education provided during session:  - See body of note.  - Results of above outlined education: Needs reinforcement    Patient at End of Session:   Location: in chair  Safety measures: alarm system in place/re-engaged  Handoff to: nurse    PLAN  Suggestions for next session as indicated: Treatment plan for next session:   TH AM : shower at 7 am in walk  shower with threshold, have patient self direct needs   TH PM: DC OT and go over recommendations for home  Additional treatment options:endurnace,dual tasking- has fish tanks so needs to care for those  Plan considerations: POA activated  Outcome measures:SLUMs scored 20 on 2/21  Family/Care partner training details:  Hospital equipment in room:      Interventions: activity tolerance training, ADL retraining, balance, bed mobility training, compensatory technique education, compensatory techniques, cognitive retraining, fine motor coordination activities, coordination, energy conservation, IADL, functional transfer training, neuromuscular reeducation, patient/family training, safety training, therapeutic activity, upper extremity strengthening/ROM, therapeutic exercise, sensory reintegration, positioning, equipment eval/education, patient education, transfer training and use of adaptive equipment Frequency: 5-7 days per week  Frequency Comments: 90 min at least 5days/wk   Duration: TBD      Agreement to plan and goals: patient agrees with goals and treatment plan      GOALS  Review Date: 2/25/2023  Long Term Goals (LTGs): to be met by discharge from rehab program.  - Patient will complete grooming at modified Independent level with adaptive equipment as deemed appropriate.  - Patient will complete bathing at modified Independent level with adaptive equipment as deemed appropriate.  - Patient will complete upper body dressing at modified Independent level with adaptive equipment as deemed appropriate.  - Patient will complete lower body dressing at modified Independent level with adaptive equipment as deemed appropriate.  - Patient will complete toileting at modified Independent level with adaptive equipment as deemed appropriate.  - Patient will complete toilet transfer at modified Independent level with adaptive equipment as deemed appropriate.  - Patient will complete walk-in shower transfer at modified  Independent level with adaptive equipment as deemed appropriate.  - Patient will complete medication management at modified Independent level.    Documented in the chart in the following areas: Pain.      Therapy procedure time and total treatment time can be found documented on the Time Entry flowsheet

## 2024-09-25 ENCOUNTER — TELEPHONE (OUTPATIENT)
Dept: FAMILY MEDICINE | Facility: CLINIC | Age: 84
End: 2024-09-25
Payer: MEDICARE

## 2024-10-10 DIAGNOSIS — K21.9 GASTROESOPHAGEAL REFLUX DISEASE WITHOUT ESOPHAGITIS: ICD-10-CM

## 2024-10-10 NOTE — TELEPHONE ENCOUNTER
Care Due:                  Date            Visit Type   Department     Provider  --------------------------------------------------------------------------------                                EP -                              PRIMARY      [facility] INTERNAL  Last Visit: 05-      CARE ([facility])   MEDICINE       Lamin Jiang  Next Visit: None Scheduled  None         None Found                                                            Last  Test          Frequency    Reason                     Performed    Due Date  --------------------------------------------------------------------------------    Office Visit  12 months..  alendronate, diclofenac,   05- 05-                             losartan, omeprazole.....    [employer] Embedded Care Due Messages. Reference number: [id number].   10/10/2024 11:34:16 AM CDT

## 2024-10-11 RX ORDER — LOSARTAN POTASSIUM 100 MG/1
100 TABLET ORAL
Qty: 30 TABLET | Refills: 0 | Status: SHIPPED | OUTPATIENT
Start: 2024-10-11

## 2024-10-11 RX ORDER — OMEPRAZOLE 20 MG/1
20 CAPSULE, DELAYED RELEASE ORAL
Qty: 30 CAPSULE | Refills: 0 | Status: SHIPPED | OUTPATIENT
Start: 2024-10-11

## 2024-10-11 NOTE — TELEPHONE ENCOUNTER
Refill Routing Note   Medication(s) are not appropriate for processing by [facility] for the following reason(s):        Non-participating provider: Dr. Jiang wanted patient to be seen, fovs with EDILIA Shane    [facility] action(s):  Route      Medication Therapy Plan:         Appointments  past 12m or future 3m with PCP    Date Provider   Last Visit   Visit date not found Nishi Shane PA-C   Next Visit   11/6/2024 Nishi Shane PA-C   ED visits in past 90 days: 0        Note composed:7:20 PM 10/10/2024

## 2024-11-06 ENCOUNTER — OFFICE VISIT (OUTPATIENT)
Dept: FAMILY MEDICINE | Facility: CLINIC | Age: 84
End: 2024-11-06
Payer: MEDICARE

## 2024-11-06 ENCOUNTER — LAB VISIT (OUTPATIENT)
Dept: LAB | Facility: HOSPITAL | Age: 84
End: 2024-11-06
Payer: MEDICARE

## 2024-11-06 VITALS
BODY MASS INDEX: 28.98 KG/M2 | HEART RATE: 65 BPM | OXYGEN SATURATION: 97 % | HEIGHT: 63 IN | SYSTOLIC BLOOD PRESSURE: 158 MMHG | DIASTOLIC BLOOD PRESSURE: 60 MMHG | WEIGHT: 163.56 LBS

## 2024-11-06 DIAGNOSIS — R79.9 ABNORMAL FINDING OF BLOOD CHEMISTRY, UNSPECIFIED: ICD-10-CM

## 2024-11-06 DIAGNOSIS — K21.9 GASTROESOPHAGEAL REFLUX DISEASE WITHOUT ESOPHAGITIS: ICD-10-CM

## 2024-11-06 DIAGNOSIS — M81.0 OSTEOPOROSIS, UNSPECIFIED OSTEOPOROSIS TYPE, UNSPECIFIED PATHOLOGICAL FRACTURE PRESENCE: ICD-10-CM

## 2024-11-06 DIAGNOSIS — E78.2 MIXED HYPERLIPIDEMIA: ICD-10-CM

## 2024-11-06 DIAGNOSIS — I70.0 AORTIC ATHEROSCLEROSIS: ICD-10-CM

## 2024-11-06 DIAGNOSIS — N18.31 STAGE 3A CHRONIC KIDNEY DISEASE: ICD-10-CM

## 2024-11-06 DIAGNOSIS — T14.8XXA MUSCLE STRAIN: ICD-10-CM

## 2024-11-06 DIAGNOSIS — L91.8 SKIN TAG: Primary | ICD-10-CM

## 2024-11-06 LAB
ALBUMIN SERPL BCP-MCNC: 4.5 G/DL (ref 3.5–5.2)
ALP SERPL-CCNC: 70 U/L (ref 40–150)
ALT SERPL W/O P-5'-P-CCNC: 15 U/L (ref 10–44)
ANION GAP SERPL CALC-SCNC: 12 MMOL/L (ref 8–16)
AST SERPL-CCNC: 25 U/L (ref 10–40)
BASOPHILS # BLD AUTO: 0.05 K/UL (ref 0–0.2)
BASOPHILS NFR BLD: 0.4 % (ref 0–1.9)
BILIRUB SERPL-MCNC: 0.8 MG/DL (ref 0.1–1)
BUN SERPL-MCNC: 26 MG/DL (ref 8–23)
CALCIUM SERPL-MCNC: 10.4 MG/DL (ref 8.7–10.5)
CHLORIDE SERPL-SCNC: 107 MMOL/L (ref 95–110)
CHOLEST SERPL-MCNC: 146 MG/DL (ref 120–199)
CHOLEST/HDLC SERPL: 2.8 {RATIO} (ref 2–5)
CO2 SERPL-SCNC: 23 MMOL/L (ref 23–29)
CREAT SERPL-MCNC: 1.3 MG/DL (ref 0.5–1.4)
DIFFERENTIAL METHOD BLD: ABNORMAL
EOSINOPHIL # BLD AUTO: 0.1 K/UL (ref 0–0.5)
EOSINOPHIL NFR BLD: 0.8 % (ref 0–8)
ERYTHROCYTE [DISTWIDTH] IN BLOOD BY AUTOMATED COUNT: 14.1 % (ref 11.5–14.5)
EST. GFR  (NO RACE VARIABLE): 40.3 ML/MIN/1.73 M^2
ESTIMATED AVG GLUCOSE: 114 MG/DL (ref 68–131)
GLUCOSE SERPL-MCNC: 91 MG/DL (ref 70–110)
HBA1C MFR BLD: 5.6 % (ref 4–5.6)
HCT VFR BLD AUTO: 34.7 % (ref 37–48.5)
HDLC SERPL-MCNC: 53 MG/DL (ref 40–75)
HDLC SERPL: 36.3 % (ref 20–50)
HGB BLD-MCNC: 11.3 G/DL (ref 12–16)
IMM GRANULOCYTES # BLD AUTO: 0.03 K/UL (ref 0–0.04)
IMM GRANULOCYTES NFR BLD AUTO: 0.2 % (ref 0–0.5)
LDLC SERPL CALC-MCNC: 76.2 MG/DL (ref 63–159)
LYMPHOCYTES # BLD AUTO: 9.5 K/UL (ref 1–4.8)
LYMPHOCYTES NFR BLD: 67.9 % (ref 18–48)
MCH RBC QN AUTO: 31.9 PG (ref 27–31)
MCHC RBC AUTO-ENTMCNC: 32.6 G/DL (ref 32–36)
MCV RBC AUTO: 98 FL (ref 82–98)
MONOCYTES # BLD AUTO: 0.6 K/UL (ref 0.3–1)
MONOCYTES NFR BLD: 4.1 % (ref 4–15)
NEUTROPHILS # BLD AUTO: 3.7 K/UL (ref 1.8–7.7)
NEUTROPHILS NFR BLD: 26.6 % (ref 38–73)
NONHDLC SERPL-MCNC: 93 MG/DL
NRBC BLD-RTO: 0 /100 WBC
PLATELET # BLD AUTO: 171 K/UL (ref 150–450)
PMV BLD AUTO: 12.9 FL (ref 9.2–12.9)
POTASSIUM SERPL-SCNC: 5.2 MMOL/L (ref 3.5–5.1)
PROT SERPL-MCNC: 7.4 G/DL (ref 6–8.4)
RBC # BLD AUTO: 3.54 M/UL (ref 4–5.4)
SODIUM SERPL-SCNC: 142 MMOL/L (ref 136–145)
TRIGL SERPL-MCNC: 84 MG/DL (ref 30–150)
TSH SERPL DL<=0.005 MIU/L-ACNC: 1.98 UIU/ML (ref 0.4–4)
WBC # BLD AUTO: 14 K/UL (ref 3.9–12.7)

## 2024-11-06 PROCEDURE — 1125F AMNT PAIN NOTED PAIN PRSNT: CPT | Mod: CPTII,S$GLB,,

## 2024-11-06 PROCEDURE — 84443 ASSAY THYROID STIM HORMONE: CPT

## 2024-11-06 PROCEDURE — 99215 OFFICE O/P EST HI 40 MIN: CPT | Mod: S$GLB,,,

## 2024-11-06 PROCEDURE — 1160F RVW MEDS BY RX/DR IN RCRD: CPT | Mod: CPTII,S$GLB,,

## 2024-11-06 PROCEDURE — 1101F PT FALLS ASSESS-DOCD LE1/YR: CPT | Mod: CPTII,S$GLB,,

## 2024-11-06 PROCEDURE — 80053 COMPREHEN METABOLIC PANEL: CPT

## 2024-11-06 PROCEDURE — 80061 LIPID PANEL: CPT

## 2024-11-06 PROCEDURE — 99999 PR PBB SHADOW E&M-EST. PATIENT-LVL IV: CPT | Mod: PBBFAC,,,

## 2024-11-06 PROCEDURE — 1159F MED LIST DOCD IN RCRD: CPT | Mod: CPTII,S$GLB,,

## 2024-11-06 PROCEDURE — 36415 COLL VENOUS BLD VENIPUNCTURE: CPT | Mod: PO

## 2024-11-06 PROCEDURE — 83036 HEMOGLOBIN GLYCOSYLATED A1C: CPT

## 2024-11-06 PROCEDURE — 85060 BLOOD SMEAR INTERPRETATION: CPT | Mod: ,,, | Performed by: PATHOLOGY

## 2024-11-06 PROCEDURE — 85007 BL SMEAR W/DIFF WBC COUNT: CPT

## 2024-11-06 PROCEDURE — 3078F DIAST BP <80 MM HG: CPT | Mod: CPTII,S$GLB,,

## 2024-11-06 PROCEDURE — 85027 COMPLETE CBC AUTOMATED: CPT

## 2024-11-06 PROCEDURE — 3077F SYST BP >= 140 MM HG: CPT | Mod: CPTII,S$GLB,,

## 2024-11-06 PROCEDURE — 1157F ADVNC CARE PLAN IN RCRD: CPT | Mod: CPTII,S$GLB,,

## 2024-11-06 PROCEDURE — 3288F FALL RISK ASSESSMENT DOCD: CPT | Mod: CPTII,S$GLB,,

## 2024-11-06 RX ORDER — LOSARTAN POTASSIUM 100 MG/1
100 TABLET ORAL DAILY
Qty: 90 TABLET | Refills: 1 | Status: SHIPPED | OUTPATIENT
Start: 2024-11-06 | End: 2025-05-05

## 2024-11-06 RX ORDER — OMEPRAZOLE 20 MG/1
20 CAPSULE, DELAYED RELEASE ORAL DAILY
Qty: 30 CAPSULE | Refills: 11 | Status: SHIPPED | OUTPATIENT
Start: 2024-11-06

## 2024-11-06 RX ORDER — DICLOFENAC SODIUM 10 MG/G
2 GEL TOPICAL 4 TIMES DAILY
Qty: 100 G | Refills: 0 | Status: SHIPPED | OUTPATIENT
Start: 2024-11-06

## 2024-11-06 RX ORDER — ALENDRONATE SODIUM 70 MG/1
70 TABLET ORAL WEEKLY
Qty: 12 TABLET | Refills: 0 | Status: SHIPPED | OUTPATIENT
Start: 2024-11-06

## 2024-11-06 RX ORDER — ROSUVASTATIN CALCIUM 40 MG/1
40 TABLET, COATED ORAL NIGHTLY
Qty: 90 TABLET | Refills: 1 | Status: SHIPPED | OUTPATIENT
Start: 2024-11-06

## 2024-11-06 NOTE — PATIENT INSTRUCTIONS
Send pic of BP log in portal in 2 weeks.   FU in the office in 4 weeks.   Labs today.   Referral to derm

## 2024-11-06 NOTE — PROGRESS NOTES
[facility]    11/6/2024      Subjective:       Patient ID:  Za is a 84 y.o. female being seen for an established visit.    Chief Complaint: Annual Exam          HPI     History of Present Illness    CHIEF COMPLAINT:  Za presents for a follow-up visit to check on her blood pressure and discuss medication refills.    Interim Hx   Last seen by Apolinar - May 2023     HPI:  Za presents with elevated blood pressure of 158/60, which she attributes to potential white coat syndrome. She reports satisfactory overall health, allowing maintenance of current prescriptions without frequent medical visits. Za reports urinary incontinence upon waking, describing her bladder as weak.    She mentions a skin growth behind her ear present for 3-4 years, with occasional itching and no pain on palpation. Za also notes the appearance of age-related hyperpigmentation on her skin.    Za denies abdominal pain on palpation and reports improvement in acid reflux symptoms with decreased food intolerance.    MEDICATIONS:  Za is on Losartan 100 mg, taking 1 tablet daily in the morning for high blood pressure. She is also on calcium supplements for bone strength and a multivitamin for general health. Her regimen includes Vitamin C 250 mg plus an additional 15 mg from the multivitamin. She uses Voltaren (diclofenac) for knee pain and is on cholesterol medicine. Za takes acid reflux medicine as needed. For osteoporosis, she is on Fosamax (alendronate).    MEDICAL HISTORY:  Za has a history of hypertension, osteoporosis diagnosed in 2019, acid reflux, and high cholesterol.     SURGICAL HISTORY:  Za underwent laser surgery a few years ago for leg swelling.    TEST RESULTS:  Two years ago, the patient's kidney function test showed decreased function. Her A1C was last checked, but the date was not specified. In 2019, the patient had a  DEXA scan which revealed osteoporosis with a 9.5% risk of major fracture.      ROS:  Constitutional: -chills, -fever  Respiratory: -cough, -shortness of breath  Cardiovascular: -chest pain  Gastrointestinal: -abdominal pain, -constipation, -diarrhea, -nausea, -vomiting  Neurological: -dizziness, -lightheadedness, -headaches  Genitourinary: +urinary incontinence  Skin: +itching                 Patient Active Problem List   Diagnosis    Hypertensive kidney disease    Mixed hyperlipidemia    CLL (chronic lymphocytic leukemia)    First degree AV block    Second degree AV block    Major depression, recurrent, chronic    Gastroesophageal reflux disease without esophagitis    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    PSVT (paroxysmal supraventricular tachycardia)    Closed nondisplaced fracture of seventh cervical vertebra    Stage 3a chronic kidney disease    Protein-calorie malnutrition, unspecified severity    Class 1 obesity due to excess calories with serious comorbidity and body mass index (BMI) of 30.0 to 30.9 in adult    Hyperparathyroidism, secondary renal    Aortic atherosclerosis               Last HgbA1C:    Lab Results   Component Value Date    HGBA1C 5.6 02/04/2019         Last Lipid Panel:    Lab Results   Component Value Date    HDL 47 05/30/2023    HDL 46 05/26/2021    HDL 43 02/04/2019       Lab Results   Component Value Date    LDLCALC 82.2 05/30/2023    LDLCALC 69.0 05/26/2021    LDLCALC 75.2 02/04/2019       Lab Results   Component Value Date    TRIG 89 05/30/2023    TRIG 105 05/26/2021    TRIG 79 02/04/2019       Lab Results   Component Value Date    CHOLHDL 32.0 05/30/2023    CHOLHDL 33.8 05/26/2021    CHOLHDL 32.1 02/04/2019               Review of patient's allergies indicates:   Allergen Reactions    Codeine         Medication List with Changes/Refills   Current Medications    ACETAMINOPHEN (TYLENOL) 500 MG TABLET    Take 500 mg by mouth every 6 (six) hours as needed for Pain.     "ASPIRIN (ECOTRIN) 81 MG EC TABLET    Take 81 mg by mouth once daily.    DIPHENHYDRAMINE (BENADRYL) 25 MG CAPSULE    Take 25 mg by mouth nightly as needed for Itching.    GUAIFENESIN/DEXTROMETHORPHAN (ROBITUSSIN-DM ORAL)    Take by mouth every evening.    POLYETHYLENE GLYCOL (GLYCOLAX) 17 GRAM PWPK    Take 17 g by mouth once daily.   Changed and/or Refilled Medications    Modified Medication Previous Medication    ALENDRONATE (FOSAMAX) 70 MG TABLET alendronate (FOSAMAX) 70 MG tablet       Take 1 tablet (70 mg total) by mouth once a week.    Take 1 tablet (70 mg total) by mouth once a week.    DICLOFENAC SODIUM (VOLTAREN) 1 % GEL diclofenac sodium (VOLTAREN) 1 % Gel       Apply 2 g topically 4 (four) times daily.    Apply 2 g topically 4 (four) times daily.    LOSARTAN (COZAAR) 100 MG TABLET losartan (COZAAR) 100 MG tablet       Take 1 tablet (100 mg total) by mouth once daily.    TAKE 1 TABLET BY MOUTH EVERY DAY    OMEPRAZOLE (PRILOSEC) 20 MG CAPSULE omeprazole (PRILOSEC) 20 MG capsule       Take 1 capsule (20 mg total) by mouth once daily.    TAKE 1 CAPSULE BY MOUTH EVERY DAY    ROSUVASTATIN (CRESTOR) 40 MG TAB rosuvastatin (CRESTOR) 40 MG Tab       Take 1 tablet (40 mg total) by mouth every evening.    TAKE 1 TABLET BY MOUTH EVERY DAY IN THE EVENING               Objective:      BP (!) 158/60   Pulse 65   Ht 5' 3" (1.6 m)   Wt 74.2 kg (163 lb 9.3 oz)   SpO2 97%   BMI 28.98 kg/m²   Estimated body mass index is 28.98 kg/m² as calculated from the following:    Height as of this encounter: 5' 3" (1.6 m).    Weight as of this encounter: 74.2 kg (163 lb 9.3 oz).  Physical Exam  Vitals reviewed.   Constitutional:       General: She is not in acute distress.     Appearance: Normal appearance.   HENT:      Head: Normocephalic and atraumatic.      Mouth/Throat:      Mouth: Mucous membranes are moist.   Eyes:      Conjunctiva/sclera: Conjunctivae normal.   Cardiovascular:      Rate and Rhythm: Normal rate.      Pulses: " Normal pulses.   Pulmonary:      Effort: Pulmonary effort is normal. No respiratory distress.   Abdominal:      General: Bowel sounds are normal.      Palpations: Abdomen is soft.      Tenderness: There is no abdominal tenderness.   Musculoskeletal:         General: Normal range of motion.      Cervical back: Normal range of motion.      Right lower leg: Edema present.      Left lower leg: Edema present.   Skin:     General: Skin is warm.      Comments: Skin tag behind R ear   Neurological:      Mental Status: She is alert.   Psychiatric:         Mood and Affect: Mood normal.         Behavior: Behavior normal.               Assessment and Plan:     Skin tag  -     Ambulatory referral/consult to Dermatology; Future; Expected date: 11/13/2024    Mixed hyperlipidemia  -     Lipid Panel; Future; Expected date: 11/06/2024  -     TSH; Future; Expected date: 11/06/2024  -     HEMOGLOBIN A1C; Future; Expected date: 11/06/2024  -     rosuvastatin (CRESTOR) 40 MG Tab; Take 1 tablet (40 mg total) by mouth every evening.  Dispense: 90 tablet; Refill: 1    Aortic atherosclerosis  -     Lipid Panel; Future; Expected date: 11/06/2024  -     HEMOGLOBIN A1C; Future; Expected date: 11/06/2024    Stage 3a chronic kidney disease  -     Comprehensive metabolic panel; Future; Expected date: 11/06/2024  -     CBC Auto Differential; Future; Expected date: 11/06/2024    Abnormal finding of blood chemistry, unspecified  -     HEMOGLOBIN A1C; Future; Expected date: 11/06/2024    Osteoporosis, unspecified osteoporosis type, unspecified pathological fracture presence  -     alendronate (FOSAMAX) 70 MG tablet; Take 1 tablet (70 mg total) by mouth once a week.  Dispense: 12 tablet; Refill: 0    Muscle strain  -     diclofenac sodium (VOLTAREN) 1 % Gel; Apply 2 g topically 4 (four) times daily.  Dispense: 100 g; Refill: 0    Gastroesophageal reflux disease without esophagitis  -     omeprazole (PRILOSEC) 20 MG capsule; Take 1 capsule (20 mg total)  by mouth once daily.  Dispense: 30 capsule; Refill: 11    Other orders  -     losartan (COZAAR) 100 MG tablet; Take 1 tablet (100 mg total) by mouth once daily.  Dispense: 90 tablet; Refill: 1             Assessment & Plan    Elevated blood pressure noted, likely due to white coat hypertension  Continued osteoporosis management with current regimen based on previous DEXA scan results and fracture risk  Evaluated skin lesion behind ear; referred to dermatology for further assessment  Reviewed current medication regimen, including as-needed use of acid reflux medication  Ordered lab work to assess kidney function and cholesterol levels due to prolonged interval since last tests  Recommend DEXA scan to reassess fracture risk and osteoporosis status    HYPERTENSION:  - Explained importance of regular blood pressure monitoring at home for accurate assessment.  - Za to monitor blood pressure daily at home for the next month.  - Za to record blood pressure readings with time of measurement.  - Continued losartan 100 mg daily for blood pressure management.  - Submit blood pressure log through patient portal in 2 weeks.    OSTEOPOROSIS:  - Discussed benefits of calcium intake for bone health, particularly with osteoporosis diagnosis.  - Za to maintain current calcium intake through diet (yogurt, cottage cheese).  - Continued current osteoporosis medication.  - Ordered DEXA scan.    AGING-RELATED SKIN CHANGES:  - Provided information on normal skin changes with aging.    HYPERLIPIDEMIA:  - Continued current cholesterol medication.    PAIN MANAGEMENT:  - Continued Voltaren as needed for pain management.    GASTROESOPHAGEAL REFLUX DISEASE (GERD):  - Continued current acid reflux medication as needed, rather than daily.    MEDICATIONS/SUPPLEMENTS:  - Continued current vitamin regimen, including multivitamin, vitamin for hair health, and vitamin C   - Refilled all current medications.    LABS:  - Ordered CBC, CMP, lipid  panel, and hemoglobin A1C.    DERMATOLOGY REFERRAL:  - Referred to dermatology for evaluation of skin lesion behind ear.    FOLLOW UP:  - Follow up in 4 weeks to review labs        Follow Up:    FU in 4 weeks        Other Orders Placed This Visit:  Orders Placed This Encounter   Procedures    Comprehensive metabolic panel    Lipid Panel    CBC Auto Differential    TSH    HEMOGLOBIN A1C    Ambulatory referral/consult to Dermatology         This note was generated with the assistance of ambient listening technology. Verbal consent was obtained by the patient and accompanying visitor(s) for the recording of patient appointment to facilitate this note. I attest to having reviewed and edited the generated note for accuracy, though some syntax or spelling errors may persist. Please contact the author of this note for any clarification.    Nishi Shane PA-C        I spent a total of 40 minutes on the day of the visit.This includes face to face time and non-face to face time preparing to see the patient (eg, review of tests), obtaining and/or reviewing separately obtained history, documenting clinical information in the electronic or other health record, independently interpreting results and communicating results to the patient/family/caregiver, or care coordinator.

## 2024-11-07 LAB — PATH REV BLD -IMP: NORMAL

## 2024-11-25 ENCOUNTER — PATIENT MESSAGE (OUTPATIENT)
Dept: FAMILY MEDICINE | Facility: CLINIC | Age: 84
End: 2024-11-25
Payer: MEDICARE

## 2024-12-06 ENCOUNTER — PATIENT MESSAGE (OUTPATIENT)
Dept: FAMILY MEDICINE | Facility: CLINIC | Age: 84
End: 2024-12-06
Payer: MEDICARE

## 2024-12-31 ENCOUNTER — OFFICE VISIT (OUTPATIENT)
Dept: FAMILY MEDICINE | Facility: CLINIC | Age: 84
End: 2024-12-31
Payer: MEDICARE

## 2024-12-31 VITALS
BODY MASS INDEX: 28.82 KG/M2 | SYSTOLIC BLOOD PRESSURE: 152 MMHG | HEART RATE: 97 BPM | DIASTOLIC BLOOD PRESSURE: 62 MMHG | WEIGHT: 162.69 LBS | OXYGEN SATURATION: 97 % | HEIGHT: 63 IN

## 2024-12-31 DIAGNOSIS — I12.9 HYPERTENSIVE KIDNEY DISEASE: ICD-10-CM

## 2024-12-31 DIAGNOSIS — E78.2 MIXED HYPERLIPIDEMIA: ICD-10-CM

## 2024-12-31 DIAGNOSIS — C91.10 CLL (CHRONIC LYMPHOCYTIC LEUKEMIA): Primary | ICD-10-CM

## 2024-12-31 PROCEDURE — 99214 OFFICE O/P EST MOD 30 MIN: CPT | Mod: S$GLB,,,

## 2024-12-31 PROCEDURE — 1101F PT FALLS ASSESS-DOCD LE1/YR: CPT | Mod: CPTII,S$GLB,,

## 2024-12-31 PROCEDURE — 3288F FALL RISK ASSESSMENT DOCD: CPT | Mod: CPTII,S$GLB,,

## 2024-12-31 PROCEDURE — 99999 PR PBB SHADOW E&M-EST. PATIENT-LVL IV: CPT | Mod: PBBFAC,,,

## 2024-12-31 PROCEDURE — 1160F RVW MEDS BY RX/DR IN RCRD: CPT | Mod: CPTII,S$GLB,,

## 2024-12-31 PROCEDURE — 1157F ADVNC CARE PLAN IN RCRD: CPT | Mod: CPTII,S$GLB,,

## 2024-12-31 PROCEDURE — 3078F DIAST BP <80 MM HG: CPT | Mod: CPTII,S$GLB,,

## 2024-12-31 PROCEDURE — 1126F AMNT PAIN NOTED NONE PRSNT: CPT | Mod: CPTII,S$GLB,,

## 2024-12-31 PROCEDURE — 1159F MED LIST DOCD IN RCRD: CPT | Mod: CPTII,S$GLB,,

## 2024-12-31 PROCEDURE — 3077F SYST BP >= 140 MM HG: CPT | Mod: CPTII,S$GLB,,

## 2024-12-31 RX ORDER — ROSUVASTATIN CALCIUM 40 MG/1
40 TABLET, COATED ORAL NIGHTLY
Qty: 90 TABLET | Refills: 1 | Status: SHIPPED | OUTPATIENT
Start: 2024-12-31

## 2024-12-31 NOTE — PROGRESS NOTES
[facility]    12/31/2024      Subjective:       Patient ID:  Za is a 84 y.o. female .  has a past medical history of 2nd degree AV block, CLL (chronic lymphocytic leukemia), Hyperlipidemia, and Hypertension.    History of Present Illness    CHIEF COMPLAINT:  Za presents today for blood pressure management.    BLOOD PRESSURE:  She reports blood pressure fluctuations at home ranging between 120 and 150. She expresses concern about her blood pressure management and reports consuming a high salt diet with frequent fast food consumption.    LABS:  Potassium level was 5.2 mg/dL, slightly elevated from previous measurements of 5.0-5.1 mg/dL. Blood count was elevated. TSH was normal. Kidney function has remained stable for the last two years.      ROS:  Constitutional: -chills, -fever  Respiratory: -cough, -shortness of breath  Cardiovascular: -chest pain  Gastrointestinal: -abdominal pain, -constipation, -diarrhea, -nausea, -vomiting  Neurological: -dizziness, -lightheadedness, -headaches           Patient Active Problem List   Diagnosis    Hypertensive kidney disease    Mixed hyperlipidemia    CLL (chronic lymphocytic leukemia)    First degree AV block    Second degree AV block    Major depression, recurrent, chronic    Gastroesophageal reflux disease without esophagitis    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    PSVT (paroxysmal supraventricular tachycardia)    Closed nondisplaced fracture of seventh cervical vertebra    Stage 3a chronic kidney disease    Protein-calorie malnutrition, unspecified severity    Class 1 obesity due to excess calories with serious comorbidity and body mass index (BMI) of 30.0 to 30.9 in adult    Hyperparathyroidism, secondary renal    Aortic atherosclerosis         Last HgbA1C:    Lab Results   Component Value Date    HGBA1C 5.6 11/06/2024    HGBA1C 5.6 02/04/2019         Last Lipid Panel:    Lab Results   Component Value Date    HDL  "53 11/06/2024    HDL 47 05/30/2023    HDL 46 05/26/2021       Lab Results   Component Value Date    LDLCALC 76.2 11/06/2024    LDLCALC 82.2 05/30/2023    LDLCALC 69.0 05/26/2021       Lab Results   Component Value Date    TRIG 84 11/06/2024    TRIG 89 05/30/2023    TRIG 105 05/26/2021       Lab Results   Component Value Date    CHOLHDL 36.3 11/06/2024    CHOLHDL 32.0 05/30/2023    CHOLHDL 33.8 05/26/2021         Review of patient's allergies indicates:   Allergen Reactions    Codeine         Medication List with Changes/Refills   Current Medications    ACETAMINOPHEN (TYLENOL) 500 MG TABLET    Take 500 mg by mouth every 6 (six) hours as needed for Pain.    ALENDRONATE (FOSAMAX) 70 MG TABLET    Take 1 tablet (70 mg total) by mouth once a week.    ASPIRIN (ECOTRIN) 81 MG EC TABLET    Take 81 mg by mouth once daily.    DICLOFENAC SODIUM (VOLTAREN) 1 % GEL    Apply 2 g topically 4 (four) times daily.    DIPHENHYDRAMINE (BENADRYL) 25 MG CAPSULE    Take 25 mg by mouth nightly as needed for Itching.    GUAIFENESIN/DEXTROMETHORPHAN (ROBITUSSIN-DM ORAL)    Take by mouth every evening.    LOSARTAN (COZAAR) 100 MG TABLET    Take 1 tablet (100 mg total) by mouth once daily.    OMEPRAZOLE (PRILOSEC) 20 MG CAPSULE    Take 1 capsule (20 mg total) by mouth once daily.    POLYETHYLENE GLYCOL (GLYCOLAX) 17 GRAM PWPK    Take 17 g by mouth once daily.   Changed and/or Refilled Medications    Modified Medication Previous Medication    ROSUVASTATIN (CRESTOR) 40 MG TAB rosuvastatin (CRESTOR) 40 MG Tab       Take 1 tablet (40 mg total) by mouth every evening.    Take 1 tablet (40 mg total) by mouth every evening.               Objective:      BP (!) 152/62 (BP Location: Left arm, Patient Position: Sitting)   Pulse 97   Ht 5' 3" (1.6 m)   Wt 73.8 kg (162 lb 11.2 oz)   SpO2 97%   BMI 28.82 kg/m²   Estimated body mass index is 28.82 kg/m² as calculated from the following:    Height as of this encounter: 5' 3" (1.6 m).    Weight as of this " encounter: 73.8 kg (162 lb 11.2 oz).    Physical Exam  Vitals reviewed.   Constitutional:       General: She is not in acute distress.     Appearance: Normal appearance.   HENT:      Head: Normocephalic and atraumatic.      Mouth/Throat:      Mouth: Mucous membranes are moist.   Cardiovascular:      Rate and Rhythm: Normal rate and regular rhythm.   Pulmonary:      Effort: Pulmonary effort is normal. No respiratory distress.   Musculoskeletal:         General: Normal range of motion.      Cervical back: Normal range of motion.   Skin:     General: Skin is warm.   Neurological:      Mental Status: She is alert and oriented to person, place, and time.   Psychiatric:         Mood and Affect: Mood normal.         Behavior: Behavior normal.             Assessment and Plan:     Za was seen today for hypertension.    Diagnoses and all orders for this visit:    CLL (chronic lymphocytic leukemia)    Mixed hyperlipidemia  -     rosuvastatin (CRESTOR) 40 MG Tab; Take 1 tablet (40 mg total) by mouth every evening.    Hypertensive kidney disease          Assessment & Plan    IMPRESSION:  - Reviewed elevated blood count and pathology review from recent labs; patient declined further workup or treatment for CLL   - Noted slightly elevated potassium (5.2, normal range up to 5.1); consistent with patient's historical levels  - Assessed stable kidney function over past 2 years  - Considered adding new blood pressure medication but deferred due to potential side effects and patient hesitant   - Opted to focus on dietary changes for blood pressure management before adjusting medications due to patient preference   - Blood pressure improved from initial 190 to current 150; aiming for 130-140 range    HYPERTENSION:  - Explained relationship between elevated salt intake, including fast food, and elevated blood pressure.  - Reduce salt intake, including limiting fast food consumption.  - Continue monitoring blood pressure at  home.    GENERAL FOLLOW-UP:  - Za to call insurance to inquire about their mail pharmacy options and inform the office for future prescription routing.  - Follow up in 6 months.         The patient was informed of the following statements     Emergency Care:Seek immediate medical attention in the emergency room if you experience any new or worsening symptoms, or if your current condition significantly changes or becomes more severe.  Patient Acknowledgment: Patient verbalizes understanding of the plan and agrees to proceed with the recommended care.    Follow Up:    Follow up in 6 months        Other Orders Placed This Visit:  No orders of the defined types were placed in this encounter.        This note was generated with the assistance of ambient listening technology. Verbal consent was obtained by the patient and accompanying visitor(s) for the recording of patient appointment to facilitate this note. I attest to having reviewed and edited the generated note for accuracy, though some syntax or spelling errors may persist. Please contact the author of this note for any clarification.        Nishi Shane PA-C        I spent a total of 30 minutes on the day of the visit.This includes face to face time and non-face to face time preparing to see the patient (eg, review of tests), obtaining and/or reviewing separately obtained history, documenting clinical information in the electronic or other health record, independently interpreting results and communicating results to the patient/family/caregiver, or care coordinator.

## 2025-02-21 DIAGNOSIS — M81.0 OSTEOPOROSIS, UNSPECIFIED OSTEOPOROSIS TYPE, UNSPECIFIED PATHOLOGICAL FRACTURE PRESENCE: ICD-10-CM

## 2025-02-21 RX ORDER — ALENDRONATE SODIUM 70 MG/1
70 TABLET ORAL WEEKLY
Qty: 12 TABLET | Refills: 0 | Status: SHIPPED | OUTPATIENT
Start: 2025-02-21

## 2025-02-21 NOTE — TELEPHONE ENCOUNTER
Refill Routing Note   Medication(s) are not appropriate for processing by [facility] for the following reason(s):        Outside of protocol    [facility] action(s):  Route             Appointments  past 12m or future 3m with PCP    Date Provider   Last Visit   5/30/2023 Lamin Jiang III, MD   Next Visit   6/30/2025 Lamin Jiang III, MD   ED visits in past 90 days: 0        Note composed:1:40 PM 02/21/2025

## 2025-02-21 NOTE — TELEPHONE ENCOUNTER
No care due was identified.  [facility] Embedded Care Due Messages. Reference number: [id number].   2/21/2025 9:40:22 AM CST

## 2025-03-24 DIAGNOSIS — Z00.00 ENCOUNTER FOR MEDICARE ANNUAL WELLNESS EXAM: ICD-10-CM

## 2025-05-16 DIAGNOSIS — M81.0 OSTEOPOROSIS, UNSPECIFIED OSTEOPOROSIS TYPE, UNSPECIFIED PATHOLOGICAL FRACTURE PRESENCE: ICD-10-CM

## 2025-05-16 RX ORDER — ALENDRONATE SODIUM 70 MG/1
70 TABLET ORAL WEEKLY
Qty: 12 TABLET | Refills: 0 | Status: SHIPPED | OUTPATIENT
Start: 2025-05-16

## 2025-05-16 NOTE — TELEPHONE ENCOUNTER
Care Due:                  Date            Visit Type   Department     Provider  --------------------------------------------------------------------------------                                EP -                              [facility] INTERNAL  Last Visit: 05-      CARE ([facility])   PIPE Jiang                              [facility]                              PRIMARY      [facility] FAMILY  Next Visit: 06-      CARE ([facility])   MEDICINE       Lamin Jiang                                                            Last  Test          Frequency    Reason                     Performed    Due Date  --------------------------------------------------------------------------------    Mg Level....  12 months..  alendronate..............  Not Found    Overdue    Phosphate...  12 months..  alendronate..............  Not Found    Overdue    [employer] Embedded Care Due Messages. Reference number: [id number].   5/16/2025 1:19:14 AM CDT

## 2025-05-16 NOTE — TELEPHONE ENCOUNTER
Future Appointments   Date Time Provider Department Center   6/30/2025  9:40 AM Lamin Jiang III, MD [facility]     Medication List with Changes/Refills   Current Medications    ACETAMINOPHEN (TYLENOL) 500 MG TABLET    Take 500 mg by mouth every 6 (six) hours as needed for Pain.    ALENDRONATE (FOSAMAX) 70 MG TABLET    Take 1 tablet (70 mg total) by mouth once a week.    ASPIRIN (ECOTRIN) 81 MG EC TABLET    Take 81 mg by mouth once daily.    DICLOFENAC SODIUM (VOLTAREN) 1 % GEL    Apply 2 g topically 4 (four) times daily.    DIPHENHYDRAMINE (BENADRYL) 25 MG CAPSULE    Take 25 mg by mouth nightly as needed for Itching.    GUAIFENESIN/DEXTROMETHORPHAN (ROBITUSSIN-DM ORAL)    Take by mouth every evening.    LOSARTAN (COZAAR) 100 MG TABLET    Take 1 tablet (100 mg total) by mouth once daily.    OMEPRAZOLE (PRILOSEC) 20 MG CAPSULE    Take 1 capsule (20 mg total) by mouth once daily.    POLYETHYLENE GLYCOL (GLYCOLAX) 17 GRAM PWPK    Take 17 g by mouth once daily.    ROSUVASTATIN (CRESTOR) 40 MG TAB    Take 1 tablet (40 mg total) by mouth every evening.

## 2025-06-05 ENCOUNTER — TELEPHONE (OUTPATIENT)
Dept: FAMILY MEDICINE | Facility: CLINIC | Age: 85
End: 2025-06-05
Payer: MEDICARE

## 2025-06-10 DIAGNOSIS — E78.2 MIXED HYPERLIPIDEMIA: ICD-10-CM

## 2025-06-10 DIAGNOSIS — K21.9 GASTROESOPHAGEAL REFLUX DISEASE WITHOUT ESOPHAGITIS: ICD-10-CM

## 2025-06-10 RX ORDER — ROSUVASTATIN CALCIUM 40 MG/1
40 TABLET, COATED ORAL NIGHTLY
Qty: 30 TABLET | Refills: 0 | Status: SHIPPED | OUTPATIENT
Start: 2025-06-10

## 2025-06-10 RX ORDER — LOSARTAN POTASSIUM 100 MG/1
100 TABLET ORAL DAILY
Qty: 30 TABLET | Refills: 0 | Status: SHIPPED | OUTPATIENT
Start: 2025-06-10 | End: 2025-12-07

## 2025-06-10 RX ORDER — OMEPRAZOLE 20 MG/1
20 CAPSULE, DELAYED RELEASE ORAL DAILY
Qty: 30 CAPSULE | Refills: 11 | Status: SHIPPED | OUTPATIENT
Start: 2025-06-10

## 2025-06-10 NOTE — TELEPHONE ENCOUNTER
Refill Routing Note   Medication(s) are not appropriate for processing by [facility] for the following reason(s):        Non-participating provider    [facility] action(s):  Route     Requires appointment : Yes             Appointments  past 12m or future 3m with PCP    Date Provider   Last Visit   12/31/2024 Nishi Shane PA-C   Next Visit   Visit date not found Nishi Shane PA-C   ED visits in past 90 days: 0        Note composed:11:59 AM 06/10/2025

## 2025-06-10 NOTE — TELEPHONE ENCOUNTER
Care Due:                  Date            Visit Type   Department     Provider  --------------------------------------------------------------------------------    Last Visit: None Found      None         None Found  Next Visit: None Scheduled  None         None Found                                                            Last  Test          Frequency    Reason                     Performed    Due Date  --------------------------------------------------------------------------------    Office Visit  12 months..  alendronate..............  Not Found    Overdue    [employer] Embedded Care Due Messages. Reference number: [id number].   6/10/2025 9:30:14 AM CDT

## 2025-06-23 ENCOUNTER — LAB VISIT (OUTPATIENT)
Dept: LAB | Facility: HOSPITAL | Age: 85
End: 2025-06-23
Attending: INTERNAL MEDICINE
Payer: MEDICARE

## 2025-06-23 ENCOUNTER — OFFICE VISIT (OUTPATIENT)
Dept: FAMILY MEDICINE | Facility: CLINIC | Age: 85
End: 2025-06-23
Payer: MEDICARE

## 2025-06-23 VITALS
HEART RATE: 83 BPM | SYSTOLIC BLOOD PRESSURE: 130 MMHG | BODY MASS INDEX: 27.65 KG/M2 | HEIGHT: 63 IN | WEIGHT: 156.06 LBS | OXYGEN SATURATION: 99 % | DIASTOLIC BLOOD PRESSURE: 82 MMHG

## 2025-06-23 DIAGNOSIS — N18.31 STAGE 3A CHRONIC KIDNEY DISEASE: ICD-10-CM

## 2025-06-23 DIAGNOSIS — I70.0 AORTIC ATHEROSCLEROSIS: ICD-10-CM

## 2025-06-23 DIAGNOSIS — M81.0 OSTEOPOROSIS, UNSPECIFIED OSTEOPOROSIS TYPE, UNSPECIFIED PATHOLOGICAL FRACTURE PRESENCE: ICD-10-CM

## 2025-06-23 DIAGNOSIS — K21.9 GASTROESOPHAGEAL REFLUX DISEASE WITHOUT ESOPHAGITIS: ICD-10-CM

## 2025-06-23 DIAGNOSIS — E78.2 MIXED HYPERLIPIDEMIA: ICD-10-CM

## 2025-06-23 LAB
ALBUMIN SERPL BCP-MCNC: 4.7 G/DL (ref 3.5–5.2)
ALP SERPL-CCNC: 67 UNIT/L (ref 40–150)
ALT SERPL W/O P-5'-P-CCNC: 16 UNIT/L (ref 10–44)
ANION GAP (OHS): 10 MMOL/L (ref 8–16)
AST SERPL-CCNC: 23 UNIT/L (ref 11–45)
BILIRUB SERPL-MCNC: 0.7 MG/DL (ref 0.1–1)
BUN SERPL-MCNC: 26 MG/DL (ref 8–23)
CALCIUM SERPL-MCNC: 9.6 MG/DL (ref 8.7–10.5)
CHLORIDE SERPL-SCNC: 108 MMOL/L (ref 95–110)
CHOLEST SERPL-MCNC: 134 MG/DL (ref 120–199)
CHOLEST/HDLC SERPL: 2.5 {RATIO} (ref 2–5)
CO2 SERPL-SCNC: 24 MMOL/L (ref 23–29)
CREAT SERPL-MCNC: 1.3 MG/DL (ref 0.5–1.4)
ERYTHROCYTE [DISTWIDTH] IN BLOOD BY AUTOMATED COUNT: 14.5 % (ref 11.5–14.5)
GFR SERPLBLD CREATININE-BSD FMLA CKD-EPI: 40 ML/MIN/1.73/M2
GLUCOSE SERPL-MCNC: 96 MG/DL (ref 70–110)
HCT VFR BLD AUTO: 36.6 % (ref 37–48.5)
HDLC SERPL-MCNC: 54 MG/DL (ref 40–75)
HDLC SERPL: 40.3 % (ref 20–50)
HGB BLD-MCNC: 11.3 GM/DL (ref 12–16)
LDLC SERPL CALC-MCNC: 65.6 MG/DL (ref 63–159)
MCH RBC QN AUTO: 31.5 PG (ref 27–31)
MCHC RBC AUTO-ENTMCNC: 30.9 G/DL (ref 32–36)
MCV RBC AUTO: 102 FL (ref 82–98)
NONHDLC SERPL-MCNC: 80 MG/DL
NUCLEATED RBC (/100WBC) (OHS): 0 /100 WBC
PLATELET # BLD AUTO: 183 K/UL (ref 150–450)
PMV BLD AUTO: 13.1 FL (ref 9.2–12.9)
POTASSIUM SERPL-SCNC: 5.2 MMOL/L (ref 3.5–5.1)
PROT SERPL-MCNC: 7.4 GM/DL (ref 6–8.4)
RBC # BLD AUTO: 3.59 M/UL (ref 4–5.4)
SODIUM SERPL-SCNC: 142 MMOL/L (ref 136–145)
TRIGL SERPL-MCNC: 72 MG/DL (ref 30–150)
WBC # BLD AUTO: 15.04 K/UL (ref 3.9–12.7)

## 2025-06-23 PROCEDURE — 3288F FALL RISK ASSESSMENT DOCD: CPT | Mod: CPTII,S$GLB,,

## 2025-06-23 PROCEDURE — 85025 COMPLETE CBC W/AUTO DIFF WBC: CPT

## 2025-06-23 PROCEDURE — 99214 OFFICE O/P EST MOD 30 MIN: CPT | Mod: S$GLB,,,

## 2025-06-23 PROCEDURE — 1126F AMNT PAIN NOTED NONE PRSNT: CPT | Mod: CPTII,S$GLB,,

## 2025-06-23 PROCEDURE — 80061 LIPID PANEL: CPT

## 2025-06-23 PROCEDURE — 1101F PT FALLS ASSESS-DOCD LE1/YR: CPT | Mod: CPTII,S$GLB,,

## 2025-06-23 PROCEDURE — 80053 COMPREHEN METABOLIC PANEL: CPT

## 2025-06-23 PROCEDURE — G2211 COMPLEX E/M VISIT ADD ON: HCPCS | Mod: S$GLB,,,

## 2025-06-23 PROCEDURE — 1159F MED LIST DOCD IN RCRD: CPT | Mod: CPTII,S$GLB,,

## 2025-06-23 PROCEDURE — 1160F RVW MEDS BY RX/DR IN RCRD: CPT | Mod: CPTII,S$GLB,,

## 2025-06-23 PROCEDURE — 99999 PR PBB SHADOW E&M-EST. PATIENT-LVL III: CPT | Mod: PBBFAC,,,

## 2025-06-23 PROCEDURE — 36415 COLL VENOUS BLD VENIPUNCTURE: CPT | Mod: PO

## 2025-06-23 PROCEDURE — 1157F ADVNC CARE PLAN IN RCRD: CPT | Mod: CPTII,S$GLB,,

## 2025-06-23 RX ORDER — OMEPRAZOLE 20 MG/1
20 CAPSULE, DELAYED RELEASE ORAL DAILY
Qty: 30 CAPSULE | Refills: 11 | Status: SHIPPED | OUTPATIENT
Start: 2025-06-23

## 2025-06-23 RX ORDER — LOSARTAN POTASSIUM 100 MG/1
100 TABLET ORAL DAILY
Qty: 30 TABLET | Refills: 0 | Status: SHIPPED | OUTPATIENT
Start: 2025-06-23 | End: 2025-12-20

## 2025-06-23 RX ORDER — ROSUVASTATIN CALCIUM 40 MG/1
40 TABLET, COATED ORAL NIGHTLY
Qty: 30 TABLET | Refills: 0 | Status: SHIPPED | OUTPATIENT
Start: 2025-06-23

## 2025-06-23 RX ORDER — ALENDRONATE SODIUM 70 MG/1
70 TABLET ORAL WEEKLY
Qty: 12 TABLET | Refills: 0 | Status: SHIPPED | OUTPATIENT
Start: 2025-06-23

## 2025-06-23 NOTE — PROGRESS NOTES
"  [facility]    6/23/2025      Subjective:       Patient ID:  Za is a 85 y.o. female .  has a past medical history of 2nd degree AV block, CLL (chronic lymphocytic leukemia), Hyperlipidemia, and Hypertension.    History of Present Illness    CHIEF COMPLAINT:  Za presents today for follow up.    Interim Hx   Last seen by Me - Dec 2024    Current Concerns  Reports congestion.       Chronic problems   CHRONIC LYMPHOCYTIC LEUKEMIA:  She has chronic lymphocytic leukemia (CLL) and has declined further treatment, expressing preference to not receive detailed information about disease progression to avoid emotional distress.     CARDIOVASCULAR:  She reports bilateral leg swelling, with one leg appearing larger than the other. She has a history of leg surgery from knee down and laser surgery to address foot swelling. The swelling is intermittent and may be related to inconsistent use of compression stockings.    OSTEOPOROSIS:  She has known diagnosis of osteoporosis managed with Fosamax 70mg. She is aware of the need for periodic bone density scanning and prefers scheduling bone scan in fall.    LABS:  Previous potassium level was 5.2, slightly above the normal range of 5.1.    MEDICATIONS:  She requests medication refills for crestor, Fosamax  for osteoporosis and Losartan for blood pressure. She inquires about starting fiber supplement.    GENERAL HEALTH:  She reports her overall health as "going good."      ROS:  Constitutional: -chills, -fever  Respiratory: -cough, -shortness of breath, +exertional dyspnea  Cardiovascular: -chest pain, +lower extremity swelling  Gastrointestinal: -abdominal pain, -constipation, -diarrhea, -nausea, -vomiting  Neurological: -dizziness, -lightheadedness, -headaches  Genitourinary: +blood in urine, +abnormal urine appearance, +urine changes  Musculoskeletal: +limb pain, +joint pain           Problem List[1]      Last HgbA1C:    Lab Results   Component " Value Date    HGBA1C 5.6 11/06/2024    HGBA1C 5.6 02/04/2019         Last Lipid Panel:    Lab Results   Component Value Date    HDL 53 11/06/2024    HDL 47 05/30/2023    HDL 46 05/26/2021       Lab Results   Component Value Date    LDLCALC 76.2 11/06/2024    LDLCALC 82.2 05/30/2023    LDLCALC 69.0 05/26/2021       Lab Results   Component Value Date    TRIG 84 11/06/2024    TRIG 89 05/30/2023    TRIG 105 05/26/2021       Lab Results   Component Value Date    CHOLHDL 36.3 11/06/2024    CHOLHDL 32.0 05/30/2023    CHOLHDL 33.8 05/26/2021         Review of patient's allergies indicates:   Allergen Reactions    Codeine         Medication List with Changes/Refills   Current Medications    ACETAMINOPHEN (TYLENOL) 500 MG TABLET    Take 500 mg by mouth every 6 (six) hours as needed for Pain.    ASPIRIN (ECOTRIN) 81 MG EC TABLET    Take 81 mg by mouth once daily.    DIPHENHYDRAMINE (BENADRYL) 25 MG CAPSULE    Take 25 mg by mouth nightly as needed for Itching.    GUAIFENESIN/DEXTROMETHORPHAN (ROBITUSSIN-DM ORAL)    Take by mouth every evening.   Changed and/or Refilled Medications    Modified Medication Previous Medication    ALENDRONATE (FOSAMAX) 70 MG TABLET alendronate (FOSAMAX) 70 MG tablet       Take 1 tablet (70 mg total) by mouth once a week.    TAKE 1 TABLET (70 MG TOTAL) BY MOUTH ONCE A WEEK    LOSARTAN (COZAAR) 100 MG TABLET losartan (COZAAR) 100 MG tablet       Take 1 tablet (100 mg total) by mouth once daily.    Take 1 tablet (100 mg total) by mouth once daily.    OMEPRAZOLE (PRILOSEC) 20 MG CAPSULE omeprazole (PRILOSEC) 20 MG capsule       Take 1 capsule (20 mg total) by mouth once daily.    Take 1 capsule (20 mg total) by mouth once daily.    ROSUVASTATIN (CRESTOR) 40 MG TAB rosuvastatin (CRESTOR) 40 MG Tab       Take 1 tablet (40 mg total) by mouth every evening.    Take 1 tablet (40 mg total) by mouth every evening.   Discontinued Medications    DICLOFENAC SODIUM (VOLTAREN) 1 % GEL    Apply 2 g topically 4  "(four) times daily.    POLYETHYLENE GLYCOL (GLYCOLAX) 17 GRAM PWPK    Take 17 g by mouth once daily.               Objective:      /82 (BP Location: Right arm, Patient Position: Sitting)   Pulse 83   Ht 5' 3" (1.6 m)   Wt 70.8 kg (156 lb 1.4 oz)   SpO2 99%   BMI 27.65 kg/m²   Estimated body mass index is 27.65 kg/m² as calculated from the following:    Height as of this encounter: 5' 3" (1.6 m).    Weight as of this encounter: 70.8 kg (156 lb 1.4 oz).    Physical Exam  Vitals and nursing note reviewed.   Constitutional:       General: She is not in acute distress.     Appearance: Normal appearance.   HENT:      Head: Normocephalic and atraumatic.      Mouth/Throat:      Mouth: Mucous membranes are moist.   Eyes:      Conjunctiva/sclera: Conjunctivae normal.   Cardiovascular:      Rate and Rhythm: Normal rate.      Pulses: Normal pulses.   Pulmonary:      Effort: Pulmonary effort is normal. No respiratory distress.   Musculoskeletal:         General: Normal range of motion.      Cervical back: Normal range of motion.   Skin:     General: Skin is warm.   Neurological:      Mental Status: She is alert and oriented to person, place, and time.   Psychiatric:         Mood and Affect: Mood normal.         Behavior: Behavior normal.             Assessment and Plan:   1. Osteoporosis, unspecified osteoporosis type, unspecified pathological fracture presence  - alendronate (FOSAMAX) 70 MG tablet; Take 1 tablet (70 mg total) by mouth once a week.  Dispense: 12 tablet; Refill: 0    2. Mixed hyperlipidemia  - rosuvastatin (CRESTOR) 40 MG Tab; Take 1 tablet (40 mg total) by mouth every evening.  Dispense: 30 tablet; Refill: 0  - Lipid Panel; Future    3. Gastroesophageal reflux disease without esophagitis  - omeprazole (PRILOSEC) 20 MG capsule; Take 1 capsule (20 mg total) by mouth once daily.  Dispense: 30 capsule; Refill: 11    4. Aortic atherosclerosis  - Lipid Panel; Future    5. Stage 3a chronic kidney disease  - " Comprehensive metabolic panel; Future  - CBC Auto Differential; Future     Assessment & Plan    IMPRESSION:  - Considered patient's preference not to know details about chronic lymphocytic leukemia (CLL) status.  - Evaluated leg swelling and recommended compression stockings.  - Explained benefits of fiber supplementation for bowel regularity and potential cholesterol management.    PLAN SUMMARY:  - Ordered refill of current medications  - Ordered DEXA scan to assess progression  - Ordered CMP, lipid panel, renal function tests, and CBC as routine labs  - Continue Losartan for blood pressure management  - Continue Fosamax for management  - Continue omeprazole and daily aspirin (OTC)  - Recommend wearing compression stockings during the day  - Advised to elevate legs above heart level when sitting  - Za to schedule DEXA scan independently through patient portal  - Follow up in 6 months  - Za advised to contact office if any changes occur before next appointment    CLL (CHRONIC LYMPHOCYTIC LEUKEMIA):  - Discussed with patient who expressed preference not to know the progression of the disease or pursue further treatment.  - Za does not want to worry about the status of CLL.    STAGE 3B CHRONIC KIDNEY DISEASE:  - Ordered labs to evaluate kidney function stability and monitor potassium levels.  - Continue losartan    OSTEOPOROSIS:  - Continue Fosamax for management.  - Ordered DEXA scan to assess progression   - Za advised to schedule the scan independently through the patient portal.    LOCALIZED EDEMA:  - Za experiences intermittent leg swelling.  - Recommend wearing compression stockings during the day, especially when walking or sitting, and removing them at night.  - Advised to elevate legs above heart level when sitting to improve fluid flow.      HYPERTENSION:  - Continue Losartan for blood pressure management.  - Ordered refill.    GENERAL CARE AND FOLLOW-UP:  - Continue omeprazole and daily  aspirin (OTC).  - Ordered CMP and lipid panel as routine labs.  - Follow up in 6 months.  - Za advised to contact the office if any changes occur before next scheduled appointment.         The patient was informed of the following statements     Emergency Care:Seek immediate medical attention in the emergency room if you experience any new or worsening symptoms, or if your current condition significantly changes or becomes more severe.  Patient Acknowledgment: Patient verbalizes understanding of the plan and agrees to proceed with the recommended care.    Follow Up:  12/23/2025 Lamin Jiang III, MD   Future Appointments   Date Time Provider Department Center   12/23/2025  9:20 AM Lamin Jiang III, MD [facility]                       Other Orders Placed This Visit:  Orders Placed This Encounter   Procedures    Lipid Panel    Comprehensive metabolic panel    CBC Auto Differential         This note was generated with the assistance of ambient listening technology. Verbal consent was obtained by the patient and accompanying visitor(s) for the recording of patient appointment to facilitate this note. I attest to having reviewed and edited the generated note for accuracy, though some syntax or spelling errors may persist. Please contact the author of this note for any clarification.        Nishi Shane PA-C        I spent a total of 30 minutes on the day of the visit.This includes face to face time and non-face to face time preparing to see the patient (eg, review of tests), obtaining and/or reviewing separately obtained history, documenting clinical information in the electronic or other health record, independently interpreting results and communicating results to the patient/family/caregiver, or care coordinator.            [1]   Patient Active Problem List  Diagnosis    Hypertensive kidney disease    Mixed hyperlipidemia    CLL (chronic lymphocytic leukemia)    First degree AV block    Second  degree AV block    Major depression, recurrent, chronic    Gastroesophageal reflux disease without esophagitis    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    PSVT (paroxysmal supraventricular tachycardia)    Closed nondisplaced fracture of seventh cervical vertebra    Stage 3a chronic kidney disease    Protein-calorie malnutrition, unspecified severity    Class 1 obesity due to excess calories with serious comorbidity and body mass index (BMI) of 30.0 to 30.9 in adult    Hyperparathyroidism, secondary renal    Aortic atherosclerosis

## 2025-06-24 ENCOUNTER — RESULTS FOLLOW-UP (OUTPATIENT)
Dept: FAMILY MEDICINE | Facility: CLINIC | Age: 85
End: 2025-06-24

## 2025-06-24 LAB
ABSOLUTE NEUTROPHIL MANUAL (OHS): 11.7 K/UL
ANISOCYTOSIS BLD QL SMEAR: SLIGHT
BURR CELLS BLD QL SMEAR: ABNORMAL
LYMPHOCYTES NFR BLD MANUAL: 17.5 % (ref 18–48)
MONOCYTES NFR BLD MANUAL: 5 % (ref 4–15)
NEUTROPHILS NFR BLD MANUAL: 77.5 % (ref 38–73)
OVALOCYTES BLD QL SMEAR: ABNORMAL
PLATELET BLD QL SMEAR: ABNORMAL
POIKILOCYTOSIS BLD QL SMEAR: SLIGHT

## 2025-09-03 RX ORDER — LOSARTAN POTASSIUM 100 MG/1
100 TABLET ORAL DAILY
Qty: 90 TABLET | Refills: 0 | Status: SHIPPED | OUTPATIENT
Start: 2025-09-03